# Patient Record
Sex: FEMALE | Race: BLACK OR AFRICAN AMERICAN | Employment: OTHER | URBAN - METROPOLITAN AREA
[De-identification: names, ages, dates, MRNs, and addresses within clinical notes are randomized per-mention and may not be internally consistent; named-entity substitution may affect disease eponyms.]

---

## 2017-05-16 ENCOUNTER — HOSPITAL ENCOUNTER (EMERGENCY)
Age: 82
Discharge: HOME | End: 2017-05-16
Attending: INTERNAL MEDICINE
Payer: COMMERCIAL

## 2017-05-16 VITALS
OXYGEN SATURATION: 97 % | HEIGHT: 62 IN | HEART RATE: 106 BPM | DIASTOLIC BLOOD PRESSURE: 91 MMHG | WEIGHT: 244 LBS | RESPIRATION RATE: 16 BRPM | TEMPERATURE: 97.8 F | BODY MASS INDEX: 44.9 KG/M2 | SYSTOLIC BLOOD PRESSURE: 163 MMHG

## 2017-05-16 DIAGNOSIS — D17.1 LIPOMA OF BACK: Primary | ICD-10-CM

## 2017-05-16 DIAGNOSIS — M79.89 FOOT SWELLING: ICD-10-CM

## 2017-05-16 PROCEDURE — 99283 EMERGENCY DEPT VISIT LOW MDM: CPT

## 2017-05-16 PROCEDURE — 74011250637 HC RX REV CODE- 250/637: Performed by: INTERNAL MEDICINE

## 2017-05-16 RX ORDER — ONDANSETRON 4 MG/1
4 TABLET, ORALLY DISINTEGRATING ORAL
Status: COMPLETED | OUTPATIENT
Start: 2017-05-16 | End: 2017-05-16

## 2017-05-16 RX ORDER — ASPIRIN 81 MG/1
81 TABLET ORAL DAILY
COMMUNITY

## 2017-05-16 RX ORDER — IBUPROFEN 400 MG/1
800 TABLET ORAL
Status: COMPLETED | OUTPATIENT
Start: 2017-05-16 | End: 2017-05-16

## 2017-05-16 RX ORDER — HYDROCODONE BITARTRATE AND ACETAMINOPHEN 5; 325 MG/1; MG/1
1 TABLET ORAL
Qty: 20 TAB | Refills: 0 | Status: SHIPPED | OUTPATIENT
Start: 2017-05-16 | End: 2017-12-09

## 2017-05-16 RX ORDER — CEPHALEXIN 500 MG/1
500 CAPSULE ORAL 4 TIMES DAILY
Qty: 28 CAP | Refills: 0 | Status: SHIPPED | OUTPATIENT
Start: 2017-05-16 | End: 2017-05-23

## 2017-05-16 RX ORDER — ATORVASTATIN CALCIUM 40 MG/1
40 TABLET, FILM COATED ORAL DAILY
COMMUNITY

## 2017-05-16 RX ORDER — LOSARTAN POTASSIUM 50 MG/1
50 TABLET ORAL DAILY
COMMUNITY
End: 2018-04-05

## 2017-05-16 RX ORDER — HYDROCODONE BITARTRATE AND ACETAMINOPHEN 5; 325 MG/1; MG/1
1 TABLET ORAL
Status: DISCONTINUED | OUTPATIENT
Start: 2017-05-16 | End: 2017-05-16 | Stop reason: HOSPADM

## 2017-05-16 RX ADMIN — ONDANSETRON 4 MG: 4 TABLET, ORALLY DISINTEGRATING ORAL at 18:55

## 2017-05-16 RX ADMIN — IBUPROFEN 800 MG: 400 TABLET, FILM COATED ORAL at 18:55

## 2017-05-16 NOTE — ED NOTES
Pt arrives in ED with her daughter, pt lives alone at home, uses a walker. Pt reports right foot swelling x 4 days and left sided neck pain that she woke up with this morning; pt denies injury. 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 MD advises them to return to PCP and explain increase in pain and in size of lipoma. Pedal pulses are intact, no redness to right foot, slight swelling compared to left foot. Emergency Department Nursing Plan of Care       The Nursing Plan of Care is developed from the Nursing assessment and Emergency Department Attending provider initial evaluation. The plan of care may be reviewed in the ED Provider note.     The Plan of Care was developed with the following considerations:   Patient / Family readiness to learn indicated by:verbalized understanding  Persons(s) to be included in education: patient and family  Barriers to Learning/Limitations:No    [address], RN    5/16/2017   6:52 PM

## 2017-05-16 NOTE — DISCHARGE INSTRUCTIONS
Lipoma: Care Instructions  Your Care Instructions  A lipoma is a growth of fat just below the skin. It may feel soft and rubbery. Lipomas can occur anywhere on the body. But they are most common on the torso, neck, upper thighs, upper arms, and armpits. A lipoma does not turn into cancer. Lipomas usually are not treated, because most of them don't hurt or cause problems. But your doctor may remove a lipoma if it is painful, gets infected, or bothers you. Follow-up care is a key part of your treatment and safety. Be sure to make and go to all appointments, and call your doctor if you are having problems. It's also a good idea to know your test results and keep a list of the medicines you take. How can you care for yourself at home? · Lipomas usually need no care at home. · If your doctor removes a lipoma, follow directions for taking care of the cut (incision). When should you call for help? Call your doctor now or seek immediate medical care if:  · You have signs of infection, such as:  ¨ Increased pain, swelling, warmth, or redness. ¨ Red streaks leading from the lipoma. ¨ Pus draining from the lipoma. ¨ A fever. Watch closely for changes in your health, and be sure to contact your doctor if:  · You want to discuss having a lipoma removed. Where can you learn more? Go to http://chase-wali.info/. Enter F477 in the search box to learn more about \"Lipoma: Care Instructions. \"  Current as of: October 13, 2016  Content Version: 11.2  © 3867-1576 [employer]. Care instructions adapted under license by [employer] (which disclaims liability or warranty for this information). If you have questions about a medical condition or this instruction, always ask your healthcare professional. [address] any warranty or liability for your use of this information.

## 2017-05-16 NOTE — ED PROVIDER NOTES
HPI Comments: Nathaneil Hatchet, 80 y.o. female, presents ambulatory with daughter to [facility] ED with cc of constant nonspecific left sided neck pain x 2 days and R > L foot swelling x 4 days. Patient is a poor historian though reports that her neck pain is unchanged with L arm movements. She has a significantly large lipoma of the left upper back present x 6-7 years without evaluation. Patient lives alone and walks with a walker. She has not had any CT or MRI imaging completed of her upper back. PCP: PROVIDER UNKNOWN    PMHx significant for: HTN, DM, HIV  PSHx significant for: cholecystectomy, hysterectomy, cardiac stent, cataracts  Social history significant for: - Tobacco, - EtOH, - Illicit Drug Use    There are no other complaints, changes, or physical findings at this time. Written by MARY Muiribsergio, as dictated by Laura Jimenez MD.     The history is provided by the patient and a relative. No  was used. Past Medical History:   Diagnosis Date    Diabetes ([address]     Hypertension     Infectious disease     HIV in 80 from blood transfusion       Past Surgical History:   Procedure Laterality Date    CARDIAC SURG PROCEDURE UNLIST  1995    Stent Placement    HX CHOLECYSTECTOMY      HX HEENT      cataract    HX HYSTERECTOMY      HX ORTHOPAEDIC           Family History:   Problem Relation Age of Onset    Diabetes Other     Hypertension Other        Social History     Social History    Marital status:      Spouse name: N/A    Number of children: N/A    Years of education: N/A     Occupational History    Not on file. Social History Main Topics    Smoking status: Never Smoker    Smokeless tobacco: Not on file    Alcohol use No    Drug use: No    Sexual activity: No     Other Topics Concern    Not on file     Social History Narrative    Conversation 12/26/16:    She is inclined to be DNR but she would like be full code until she discusses it with her family. Her POA is [facility] [phone]. (Daughter )        She lives alone, her son visits to assist, but she does all ADLs    She deserves six surviving children no history of alcohol cigarette use. ALLERGIES: Coumadin [warfarin]    Review of Systems   Constitutional: Negative. Negative for activity change, appetite change, chills, fatigue, fever and unexpected weight change. HENT: Negative. Negative for congestion, hearing loss, rhinorrhea, sneezing and voice change. Eyes: Negative. Negative for pain and visual disturbance. Respiratory: Negative. Negative for apnea, cough, choking, chest tightness and shortness of breath. Cardiovascular: Positive for leg swelling. Negative for chest pain and palpitations. Gastrointestinal: Negative. Negative for abdominal distention, abdominal pain, blood in stool, diarrhea, nausea and vomiting. Genitourinary: Negative. Negative for difficulty urinating, flank pain, frequency and urgency. No discharge   Musculoskeletal: Positive for neck pain. Negative for arthralgias, back pain, myalgias and neck stiffness. Skin: Negative. Negative for color change and rash. Neurological: Negative. Negative for dizziness, seizures, syncope, speech difficulty, weakness, numbness and headaches. Hematological: Negative for adenopathy. Psychiatric/Behavioral: Negative. Negative for agitation, behavioral problems, dysphoric mood and suicidal ideas. The patient is not nervous/anxious. Vitals:    05/16/17 1810 05/16/17 1824 05/16/17 1826 05/16/17 1855   BP: (!) 163/91      Pulse: (!) 119  (!) 102 (!) 106   Resp: 16      Temp: 97.8 °F (36.6 °C)      SpO2: 95%   97%   Weight: 109.8 kg (242 lb) 110.7 kg (244 lb)     Height: 5' 2\" (1.575 m)               Physical Exam   Constitutional: She is oriented to person, place, and time. She appears well-developed and well-nourished. Morbidly obese. HENT:   Head: Normocephalic and atraumatic. Mouth/Throat: Oropharynx is clear and moist.   Eyes: Conjunctivae and EOM are normal. Pupils are equal, round, and reactive to light. Neck: Normal range of motion. Neck supple. Cardiovascular: Regular rhythm and normal heart sounds. Tachycardia present. Exam reveals no gallop and no friction rub. No murmur heard. Pulses:       Radial pulses are 2+ on the right side, and 2+ on the left side. Dorsalis pedis pulses are 2+ on the right side, and 2+ on the left side. Pulmonary/Chest: Effort normal and breath sounds normal. No respiratory distress. She has no wheezes. She has no rales. Abdominal: Soft. Bowel sounds are normal. She exhibits no distension. There is no tenderness. There is no rebound and no guarding. Musculoskeletal: Normal range of motion. She exhibits no edema or tenderness. Right foot: There is swelling (mild superficial, dorsally). Lymphadenopathy:     She has no cervical adenopathy. Neurological: She is alert and oriented to person, place, and time. She has normal strength. No cranial nerve deficit or sensory deficit. She displays a negative Romberg sign. Coordination and gait normal.   Skin: Skin is warm and dry. No ecchymosis, no lesion and no rash noted. Rash is not urticarial. She is not diaphoretic. No erythema. Psychiatric: She has a normal mood and affect. Nursing note and vitals reviewed. MDM  Number of Diagnoses or Management Options  Foot swelling:   Lipoma of back:   Diagnosis management comments:   Ddx: lipoma, neck strain, nerve impingement, cervical radiculopathy        Amount and/or Complexity of Data Reviewed  Review and summarize past medical records: yes    Patient Progress  Patient progress: stable    ED Course       Procedures    Progress Note:  7:04 PM  Patient resting in [facility] with daughter at bedside. Counseled on home tx plan, f/u instructions, and return precautions.  Patient expresses understanding and agrees to follow up as advised. Addressed questions. Written by MARY Orribe, as dictated by Rozina Mcgarry MD.     MEDICATIONS GIVEN:  Medications   HYDROcodone-acetaminophen (NORCO) 5-325 mg per tablet 1 Tab (1 Tab Oral Refused 5/16/17 1854)   ondansetron (ZOFRAN ODT) tablet 4 mg (4 mg Oral Given 5/16/17 1855)   ibuprofen (MOTRIN) tablet 800 mg (800 mg Oral Given 5/16/17 1855)       IMPRESSION:  1. Lipoma of back    2. Foot swelling        PLAN:  1. Discharge Medication List as of 5/16/2017  7:21 PM      START taking these medications    Details   HYDROcodone-acetaminophen (NORCO) 5-325 mg per tablet Take 1 Tab by mouth every four (4) hours as needed for Pain. Max Daily Amount: 6 Tabs., Print, Disp-20 Tab, R-0      cephALEXin (KEFLEX) 500 mg capsule Take 1 Cap by mouth four (4) times daily for 7 days. , Normal, Disp-28 Cap, R-0         CONTINUE these medications which have NOT CHANGED    Details   aspirin delayed-release 81 mg tablet Take 81 mg by mouth daily. , Historical Med      losartan (COZAAR) 50 mg tablet Take 50 mg by mouth daily. , Historical Med      atorvastatin (LIPITOR) 40 mg tablet Take 40 mg by mouth daily. , Historical Med      hydroCHLOROthiazide (HYDRODIURIL) 25 mg tablet Take 25 mg by mouth daily. , Historical Med      metFORMIN (GLUMETZA ER) 500 mg TG24 24 hour tablet Take 100 mg by mouth two (2) times a day., Historical Med      metoprolol succinate (TOPROL-XL) 100 mg tablet Take 100 mg by mouth daily. , Historical Med      elvitegravir-cobicistat-emtricitabine-tenofovir alafenamide (GENVOYA) tab tablet Take 1 Tab by mouth daily (with breakfast). , Historical Med           2. Follow-up Information     Follow up With Details Comments Contact Info    Maria Guadalupe Gallegos MD In 2 days For wound re-check [address] [place] [address] [phone]          Return to ED if worse     Discharge Note:  6:49 PM  The pt is ready for discharge.  The pt's signs, symptoms, diagnosis, and discharge instructions have been discussed and pt has conveyed their understanding. The pt is to follow up as recommended or return to ER should their symptoms worsen. Plan has been discussed and pt is in agreement. This note is prepared by Lonnie Mortensen, acting as a Scribe for Lee Ann Tucker MD.    Lee Ann Tucker MD: The scribe's documentation has been prepared under my direction and personally reviewed by me in its entirety. I confirm that the notes above accurately reflects all work, treatment, procedures, and medical decision making performed by me.

## 2017-12-09 ENCOUNTER — HOSPITAL ENCOUNTER (EMERGENCY)
Age: 82
Discharge: HOME | End: 2017-12-09
Attending: EMERGENCY MEDICINE
Payer: COMMERCIAL

## 2017-12-09 VITALS
RESPIRATION RATE: 16 BRPM | SYSTOLIC BLOOD PRESSURE: 180 MMHG | WEIGHT: 235 LBS | BODY MASS INDEX: 43.24 KG/M2 | OXYGEN SATURATION: 97 % | HEIGHT: 62 IN | TEMPERATURE: 97.9 F | DIASTOLIC BLOOD PRESSURE: 95 MMHG | HEART RATE: 89 BPM

## 2017-12-09 DIAGNOSIS — R31.9 URINARY TRACT INFECTION WITH HEMATURIA, SITE UNSPECIFIED: Primary | ICD-10-CM

## 2017-12-09 DIAGNOSIS — N39.0 URINARY TRACT INFECTION WITH HEMATURIA, SITE UNSPECIFIED: Primary | ICD-10-CM

## 2017-12-09 LAB
APPEARANCE UR: ABNORMAL
BACTERIA URNS QL MICRO: ABNORMAL /HPF
BILIRUB UR QL: NEGATIVE
COLOR UR: ABNORMAL
EPITH CASTS URNS QL MICRO: ABNORMAL /LPF
GLUCOSE UR STRIP.AUTO-MCNC: NEGATIVE MG/DL
HGB UR QL STRIP: ABNORMAL
KETONES UR QL STRIP.AUTO: NEGATIVE MG/DL
LEUKOCYTE ESTERASE UR QL STRIP.AUTO: ABNORMAL
NITRITE UR QL STRIP.AUTO: NEGATIVE
PH UR STRIP: 6 [PH] (ref 5–8)
PROT UR STRIP-MCNC: 100 MG/DL
RBC #/AREA URNS HPF: ABNORMAL /HPF (ref 0–5)
SP GR UR REFRACTOMETRY: <1.005 (ref 1–1.03)
UA: UC IF INDICATED,UAUC: ABNORMAL
UROBILINOGEN UR QL STRIP.AUTO: 0.2 EU/DL (ref 0.2–1)
WBC URNS QL MICRO: ABNORMAL /HPF (ref 0–4)

## 2017-12-09 PROCEDURE — 99283 EMERGENCY DEPT VISIT LOW MDM: CPT

## 2017-12-09 PROCEDURE — 87086 URINE CULTURE/COLONY COUNT: CPT | Performed by: EMERGENCY MEDICINE

## 2017-12-09 PROCEDURE — 87186 SC STD MICRODIL/AGAR DIL: CPT | Performed by: EMERGENCY MEDICINE

## 2017-12-09 PROCEDURE — 81001 URINALYSIS AUTO W/SCOPE: CPT | Performed by: EMERGENCY MEDICINE

## 2017-12-09 PROCEDURE — 87077 CULTURE AEROBIC IDENTIFY: CPT | Performed by: EMERGENCY MEDICINE

## 2017-12-09 RX ORDER — CEPHALEXIN 500 MG/1
500 CAPSULE ORAL 2 TIMES DAILY
Qty: 14 CAP | Refills: 0 | Status: SHIPPED | OUTPATIENT
Start: 2017-12-09 | End: 2017-12-16

## 2017-12-09 RX ORDER — CEPHALEXIN 500 MG/1
500 CAPSULE ORAL 2 TIMES DAILY
Qty: 14 CAP | Refills: 0 | Status: SHIPPED | OUTPATIENT
Start: 2017-12-09 | End: 2017-12-09

## 2017-12-09 NOTE — ED PROVIDER NOTES
HPI Comments: Melina Cedillo is a 80 y.o. female w/ hx significant for HTN, DM, and HIV who presents ambulatory to the ED c/o hematuria, dysuria, frequency, and urgency starting this AM. Pt endorses her BG was 109 this AM.     Pt specifically denies fever, chills, diarrhea, abd pain, lightheadedness, n/v, headache, cp, sob. PCP: PROVIDER UNKNOWN    There are no other complaints, changes or physical findings at this time. Patient is a 80 y.o. female presenting with urinary pain. The history is provided by the patient. Urinary Pain    This is a new problem. The current episode started 6 to 12 hours ago. The problem occurs every urination. The problem has been gradually worsening. The quality of the pain is described as burning. The patient is experiencing no pain. There has been no fever. Associated symptoms include frequency, hematuria, hesitancy and urgency. Pertinent negatives include no chills, no sweats, no nausea, no vomiting, no discharge, no flank pain, no vaginal discharge, no abdominal pain and no back pain. The patient is not pregnant. Treatments tried: Pt states she took one 1 leftover abx this am. unknown name. Past Medical History:   Diagnosis Date    Diabetes ([address]     Hypertension     Infectious disease     HIV in 80 from blood transfusion       Past Surgical History:   Procedure Laterality Date    CARDIAC SURG PROCEDURE UNLIST  1995    Stent Placement    HX CHOLECYSTECTOMY      HX HEENT      cataract    HX HYSTERECTOMY      HX ORTHOPAEDIC           Family History:   Problem Relation Age of Onset    Diabetes Other     Hypertension Other        Social History     Social History    Marital status:      Spouse name: N/A    Number of children: N/A    Years of education: N/A     Occupational History    Not on file.      Social History Main Topics    Smoking status: Never Smoker    Smokeless tobacco: Not on file    Alcohol use No    Drug use: No    Sexual activity: No Other Topics Concern    Not on file     Social History Narrative    Conversation 12/26/16:    She is inclined to be DNR but she would like be full code until she discusses it with her family. Her POA is [facility] [phone]. (Daughter )        She lives alone, her son visits to assist, but she does all ADLs    She deserves six surviving children no history of alcohol cigarette use. ALLERGIES: Coumadin [warfarin]    Review of Systems   Constitutional: Negative for activity change, chills, fatigue and fever. HENT: Negative. Respiratory: Negative. Negative for cough and shortness of breath. Cardiovascular: Negative. Negative for chest pain. Gastrointestinal: Negative for abdominal pain, constipation, diarrhea, nausea and vomiting. Genitourinary: Positive for dysuria, frequency, hematuria, hesitancy and urgency. Negative for difficulty urinating, flank pain, pelvic pain, vaginal bleeding, vaginal discharge and vaginal pain. Musculoskeletal: Negative. Negative for back pain. Skin: Negative. Negative for rash. Neurological: Negative. Negative for dizziness, syncope, weakness, light-headedness and headaches. Psychiatric/Behavioral: Negative. Vitals:    12/09/17 1507   BP: (!) 180/95   Pulse: 89   Resp: 16   Temp: 97.9 °F (36.6 °C)   SpO2: 97%   Weight: 106.6 kg (235 lb)   Height: 5' 2\" (1.575 m)            Physical Exam   Constitutional: She is oriented to person, place, and time. She appears well-developed and well-nourished. No distress. HENT:   Head: Normocephalic and atraumatic. Right Ear: Hearing and external ear normal.   Left Ear: Hearing and external ear normal.   Nose: Nose normal.   Eyes: Conjunctivae and EOM are normal. Pupils are equal, round, and reactive to light. Neck: Normal range of motion. Cardiovascular: Normal rate, regular rhythm, normal heart sounds and intact distal pulses.     Pulmonary/Chest: Effort normal. No respiratory distress. She has no wheezes. She has no rales. She exhibits no tenderness. Abdominal: Soft. Bowel sounds are normal. She exhibits no distension and no mass. There is no tenderness. There is no rebound and no guarding. Musculoskeletal: Normal range of motion. Neurological: She is alert and oriented to person, place, and time. Skin: Skin is warm and dry. She is not diaphoretic. Psychiatric: She has a normal mood and affect. Her behavior is normal. Judgment and thought content normal.   Nursing note and vitals reviewed.        MDM  Number of Diagnoses or Management Options  Urinary tract infection without hematuria, site unspecified:   Diagnosis management comments: DDX: uti, pyelo, kidney stone    LABORATORY TESTS:  Recent Results (from the past 12 hour(s))  -URINALYSIS W/ REFLEX CULTURE  Collection Time: 12/09/17  4:00 PM       Result                                            Value                         Ref Range                       Color                                             YELLOW/STRAW                                                  Appearance                                        TURBID (A)                    CLEAR                           Specific gravity                                  <1.005                        1.003 - 1.030                   pH (UA)                                           6.0                           5.0 - 8.0                       Protein                                           100 (A)                       NEG mg/dL                       Glucose                                           NEGATIVE                      NEG mg/dL                       Ketone                                            NEGATIVE                      NEG mg/dL                       Bilirubin                                         NEGATIVE                      NEG                             Blood                                             LARGE (A)                     NEG Urobilinogen                                      0.2                           0.2 - 1.0 EU/dL                 Nitrites                                          NEGATIVE                      NEG                             Leukocyte Esterase                                LARGE (A)                     NEG                             WBC                                                                       0 - 4 /hpf                      RBC                                               5-10                          0 - 5 /hpf                      Epithelial cells                                  FEW                           FEW /lpf                        Bacteria                                          3+ (A)                        NEG /hpf                        UA:UC IF INDICATED                                URINE CULTURE ORDERED (A)     CNI                          IMAGING RESULTS:  No orders to display    MEDICATIONS GIVEN:  Medications - No data to display    IMPRESSION:  Urinary tract infection without hematuria, site unspecified  (primary encounter diagnosis)    PLAN:  1. Current Discharge Medication List    START taking these medications    cephALEXin (KEFLEX) 500 mg capsule  Take 1 Cap by mouth two (2) times a day for 7 days. Qty: 14 Cap Refills: 0      CONTINUE these medications which have NOT CHANGED    aspirin delayed-release 81 mg tablet  Take 81 mg by mouth daily. losartan (COZAAR) 50 mg tablet  Take 50 mg by mouth daily. atorvastatin (LIPITOR) 40 mg tablet  Take 40 mg by mouth daily. hydroCHLOROthiazide (HYDRODIURIL) 25 mg tablet  Take 25 mg by mouth daily. metFORMIN (GLUMETZA ER) 500 mg TG24 24 hour tablet  Take 100 mg by mouth two (2) times a day. metoprolol succinate (TOPROL-XL) 100 mg tablet  Take 100 mg by mouth daily.     elvitegravir-cobicistat-emtricitabine-tenofovir alafenamide (GENVOYA) tab tablet  Take 1 Tab by mouth daily (with breakfast). 2. Follow-up Information     Follow up With Details Comments Contact Info    Provider Unknown Schedule an appointment as soon as possible for a visit   in 1 week As needed, If symptoms worsen Patient not available to ask      Geovanna Hyde [address] Schedule an appointment as soon   as possible for a visit in 1 week As needed, If symptoms worsen Andre Bejarano  [phone]      Return to ED if worse                  Amount and/or Complexity of Data Reviewed  Clinical lab tests: ordered and reviewed    Patient Progress  Patient progress: stable    ED Course       Procedures    4:40 PM  I have discussed with patient their diagnosis, treatment, and follow up plan. The patient agrees to follow up as outlined in discharge paperwork and also to return to the ED with any worsening.  Mario Arriaza PA-C

## 2017-12-09 NOTE — DISCHARGE INSTRUCTIONS

## 2017-12-09 NOTE — ED NOTES
Emergency Department Nursing Plan of Care       The Nursing Plan of Care is developed from the Nursing assessment and Emergency Department Attending provider initial evaluation. The plan of care may be reviewed in the ED Provider note.     The Plan of Care was developed with the following considerations:   Patient / Family readiness to learn indicated by:verbalized understanding  Persons(s) to be included in education: patient  Barriers to Learning/Limitations:No    Signed     Amada Marcial RN    12/9/2017   5:10 PM

## 2017-12-11 LAB
BACTERIA SPEC CULT: ABNORMAL
CC UR VC: ABNORMAL
SERVICE CMNT-IMP: ABNORMAL

## 2018-04-05 ENCOUNTER — APPOINTMENT (OUTPATIENT)
Dept: CT IMAGING | Age: 83
DRG: 101 | End: 2018-04-05
Attending: EMERGENCY MEDICINE
Payer: MEDICARE

## 2018-04-05 ENCOUNTER — HOSPITAL ENCOUNTER (OUTPATIENT)
Age: 83
Setting detail: OBSERVATION
Discharge: HOME | DRG: 101 | End: 2018-04-06
Attending: EMERGENCY MEDICINE | Admitting: INTERNAL MEDICINE
Payer: MEDICARE

## 2018-04-05 DIAGNOSIS — G45.9 TRANSIENT CEREBRAL ISCHEMIA, UNSPECIFIED TYPE: Primary | ICD-10-CM

## 2018-04-05 DIAGNOSIS — I48.91 ATRIAL FIBRILLATION, UNSPECIFIED TYPE (HCC): ICD-10-CM

## 2018-04-05 DIAGNOSIS — N28.9 ACUTE RENAL INSUFFICIENCY: ICD-10-CM

## 2018-04-05 PROBLEM — N17.9 AKI (ACUTE KIDNEY INJURY) (HCC): Status: ACTIVE | Noted: 2018-04-05

## 2018-04-05 LAB
ALBUMIN SERPL-MCNC: 3.5 G/DL (ref 3.5–5)
ALBUMIN/GLOB SERPL: 0.8 {RATIO} (ref 1.1–2.2)
ALP SERPL-CCNC: 79 U/L (ref 45–117)
ALT SERPL-CCNC: 22 U/L (ref 12–78)
ANION GAP SERPL CALC-SCNC: 10 MMOL/L (ref 5–15)
APPEARANCE UR: CLEAR
AST SERPL-CCNC: 22 U/L (ref 15–37)
BACTERIA URNS QL MICRO: NEGATIVE /HPF
BASOPHILS # BLD: 0 K/UL (ref 0–0.1)
BASOPHILS NFR BLD: 1 % (ref 0–1)
BILIRUB SERPL-MCNC: 0.4 MG/DL (ref 0.2–1)
BILIRUB UR QL: NEGATIVE
BUN SERPL-MCNC: 23 MG/DL (ref 6–20)
BUN/CREAT SERPL: 13 (ref 12–20)
CALCIUM SERPL-MCNC: 8.9 MG/DL (ref 8.5–10.1)
CHLORIDE SERPL-SCNC: 105 MMOL/L (ref 97–108)
CK MB CFR SERPL CALC: 0.7 % (ref 0–2.5)
CK MB SERPL-MCNC: 1.2 NG/ML (ref 5–25)
CK SERPL-CCNC: 164 U/L (ref 26–192)
CO2 SERPL-SCNC: 27 MMOL/L (ref 21–32)
COLOR UR: ABNORMAL
CREAT SERPL-MCNC: 1.81 MG/DL (ref 0.55–1.02)
DIFFERENTIAL METHOD BLD: ABNORMAL
EOSINOPHIL # BLD: 0.2 K/UL (ref 0–0.4)
EOSINOPHIL NFR BLD: 2 % (ref 0–7)
EPITH CASTS URNS QL MICRO: ABNORMAL /LPF
ERYTHROCYTE [DISTWIDTH] IN BLOOD BY AUTOMATED COUNT: 13.2 % (ref 11.5–14.5)
GLOBULIN SER CALC-MCNC: 4.3 G/DL (ref 2–4)
GLUCOSE BLD STRIP.AUTO-MCNC: 119 MG/DL (ref 65–100)
GLUCOSE BLD STRIP.AUTO-MCNC: 147 MG/DL (ref 65–100)
GLUCOSE SERPL-MCNC: 132 MG/DL (ref 65–100)
GLUCOSE UR STRIP.AUTO-MCNC: NEGATIVE MG/DL
HCT VFR BLD AUTO: 33.6 % (ref 35–47)
HGB BLD-MCNC: 10.8 G/DL (ref 11.5–16)
HGB UR QL STRIP: NEGATIVE
IMM GRANULOCYTES # BLD: 0 K/UL (ref 0–0.04)
IMM GRANULOCYTES NFR BLD AUTO: 0 % (ref 0–0.5)
KETONES UR QL STRIP.AUTO: NEGATIVE MG/DL
LEUKOCYTE ESTERASE UR QL STRIP.AUTO: ABNORMAL
LYMPHOCYTES # BLD: 1.6 K/UL (ref 0.8–3.5)
LYMPHOCYTES NFR BLD: 25 % (ref 12–49)
MCH RBC QN AUTO: 29.3 PG (ref 26–34)
MCHC RBC AUTO-ENTMCNC: 32.1 G/DL (ref 30–36.5)
MCV RBC AUTO: 91.3 FL (ref 80–99)
MONOCYTES # BLD: 0.5 K/UL (ref 0–1)
MONOCYTES NFR BLD: 8 % (ref 5–13)
MUCOUS THREADS URNS QL MICRO: ABNORMAL /LPF
NEUTS SEG # BLD: 4 K/UL (ref 1.8–8)
NEUTS SEG NFR BLD: 64 % (ref 32–75)
NITRITE UR QL STRIP.AUTO: NEGATIVE
NRBC # BLD: 0 K/UL (ref 0–0.01)
NRBC BLD-RTO: 0 PER 100 WBC
PH UR STRIP: 5.5 [PH] (ref 5–8)
PLATELET # BLD AUTO: 236 K/UL (ref 150–400)
PMV BLD AUTO: 10.9 FL (ref 8.9–12.9)
POTASSIUM SERPL-SCNC: 4 MMOL/L (ref 3.5–5.1)
PROT SERPL-MCNC: 7.8 G/DL (ref 6.4–8.2)
PROT UR STRIP-MCNC: NEGATIVE MG/DL
RBC # BLD AUTO: 3.68 M/UL (ref 3.8–5.2)
RBC #/AREA URNS HPF: ABNORMAL /HPF (ref 0–5)
SERVICE CMNT-IMP: ABNORMAL
SERVICE CMNT-IMP: ABNORMAL
SODIUM SERPL-SCNC: 142 MMOL/L (ref 136–145)
SP GR UR REFRACTOMETRY: 1.01 (ref 1–1.03)
TROPONIN I SERPL-MCNC: <0.04 NG/ML
UA: UC IF INDICATED,UAUC: ABNORMAL
UROBILINOGEN UR QL STRIP.AUTO: 0.2 EU/DL (ref 0.2–1)
WBC # BLD AUTO: 6.2 K/UL (ref 3.6–11)
WBC URNS QL MICRO: ABNORMAL /HPF (ref 0–4)

## 2018-04-05 PROCEDURE — 96360 HYDRATION IV INFUSION INIT: CPT

## 2018-04-05 PROCEDURE — 82962 GLUCOSE BLOOD TEST: CPT

## 2018-04-05 PROCEDURE — 99284 EMERGENCY DEPT VISIT MOD MDM: CPT

## 2018-04-05 PROCEDURE — 85025 COMPLETE CBC W/AUTO DIFF WBC: CPT | Performed by: EMERGENCY MEDICINE

## 2018-04-05 PROCEDURE — 96361 HYDRATE IV INFUSION ADD-ON: CPT

## 2018-04-05 PROCEDURE — 65270000029 HC RM PRIVATE

## 2018-04-05 PROCEDURE — 94762 N-INVAS EAR/PLS OXIMTRY CONT: CPT

## 2018-04-05 PROCEDURE — 99218 HC RM OBSERVATION: CPT

## 2018-04-05 PROCEDURE — 84484 ASSAY OF TROPONIN QUANT: CPT | Performed by: EMERGENCY MEDICINE

## 2018-04-05 PROCEDURE — 36415 COLL VENOUS BLD VENIPUNCTURE: CPT | Performed by: EMERGENCY MEDICINE

## 2018-04-05 PROCEDURE — 70450 CT HEAD/BRAIN W/O DYE: CPT

## 2018-04-05 PROCEDURE — 80053 COMPREHEN METABOLIC PANEL: CPT | Performed by: EMERGENCY MEDICINE

## 2018-04-05 PROCEDURE — 82550 ASSAY OF CK (CPK): CPT | Performed by: EMERGENCY MEDICINE

## 2018-04-05 PROCEDURE — 81001 URINALYSIS AUTO W/SCOPE: CPT | Performed by: EMERGENCY MEDICINE

## 2018-04-05 PROCEDURE — 74011250636 HC RX REV CODE- 250/636: Performed by: INTERNAL MEDICINE

## 2018-04-05 PROCEDURE — 74011250636 HC RX REV CODE- 250/636: Performed by: EMERGENCY MEDICINE

## 2018-04-05 PROCEDURE — 93005 ELECTROCARDIOGRAM TRACING: CPT

## 2018-04-05 RX ORDER — ACETAMINOPHEN 650 MG/1
650 SUPPOSITORY RECTAL
Status: DISCONTINUED | OUTPATIENT
Start: 2018-04-05 | End: 2018-04-06 | Stop reason: HOSPADM

## 2018-04-05 RX ORDER — MELATONIN
1000 DAILY
COMMUNITY
End: 2018-04-25 | Stop reason: SDUPTHER

## 2018-04-05 RX ORDER — ZINC OXIDE 200 MG/G
OINTMENT TOPICAL AS NEEDED
COMMUNITY
End: 2021-07-12

## 2018-04-05 RX ORDER — ISOSORBIDE MONONITRATE 60 MG/1
60 TABLET, EXTENDED RELEASE ORAL
COMMUNITY
End: 2021-07-12

## 2018-04-05 RX ORDER — SODIUM CHLORIDE 0.9 % (FLUSH) 0.9 %
5-10 SYRINGE (ML) INJECTION EVERY 8 HOURS
Status: DISCONTINUED | OUTPATIENT
Start: 2018-04-05 | End: 2018-04-06 | Stop reason: HOSPADM

## 2018-04-05 RX ORDER — ASPIRIN 81 MG/1
81 TABLET ORAL DAILY
Status: DISCONTINUED | OUTPATIENT
Start: 2018-04-06 | End: 2018-04-06 | Stop reason: HOSPADM

## 2018-04-05 RX ORDER — SODIUM CHLORIDE 0.9 % (FLUSH) 0.9 %
5-10 SYRINGE (ML) INJECTION AS NEEDED
Status: DISCONTINUED | OUTPATIENT
Start: 2018-04-05 | End: 2018-04-06 | Stop reason: HOSPADM

## 2018-04-05 RX ORDER — DEXTROSE 50 % IN WATER (D50W) INTRAVENOUS SYRINGE
12.5-25 AS NEEDED
Status: DISCONTINUED | OUTPATIENT
Start: 2018-04-05 | End: 2018-04-06 | Stop reason: HOSPADM

## 2018-04-05 RX ORDER — ZINC OXIDE 20 G/100G
OINTMENT TOPICAL AS NEEDED
Status: DISCONTINUED | OUTPATIENT
Start: 2018-04-05 | End: 2018-04-06 | Stop reason: HOSPADM

## 2018-04-05 RX ORDER — ISOSORBIDE MONONITRATE 30 MG/1
60 TABLET, EXTENDED RELEASE ORAL
Status: DISCONTINUED | OUTPATIENT
Start: 2018-04-06 | End: 2018-04-06 | Stop reason: HOSPADM

## 2018-04-05 RX ORDER — ENOXAPARIN SODIUM 100 MG/ML
40 INJECTION SUBCUTANEOUS EVERY 24 HOURS
Status: DISCONTINUED | OUTPATIENT
Start: 2018-04-05 | End: 2018-04-06 | Stop reason: HOSPADM

## 2018-04-05 RX ORDER — MELATONIN
1000 DAILY
Status: DISCONTINUED | OUTPATIENT
Start: 2018-04-06 | End: 2018-04-06 | Stop reason: HOSPADM

## 2018-04-05 RX ORDER — SODIUM CHLORIDE 9 MG/ML
75 INJECTION, SOLUTION INTRAVENOUS CONTINUOUS
Status: DISCONTINUED | OUTPATIENT
Start: 2018-04-05 | End: 2018-04-06 | Stop reason: HOSPADM

## 2018-04-05 RX ORDER — METOPROLOL SUCCINATE 50 MG/1
100 TABLET, EXTENDED RELEASE ORAL DAILY
Status: DISCONTINUED | OUTPATIENT
Start: 2018-04-05 | End: 2018-04-05

## 2018-04-05 RX ORDER — ACETAMINOPHEN 325 MG/1
650 TABLET ORAL
Status: DISCONTINUED | OUTPATIENT
Start: 2018-04-05 | End: 2018-04-06 | Stop reason: HOSPADM

## 2018-04-05 RX ORDER — INSULIN LISPRO 100 [IU]/ML
INJECTION, SOLUTION INTRAVENOUS; SUBCUTANEOUS
Status: DISCONTINUED | OUTPATIENT
Start: 2018-04-05 | End: 2018-04-06 | Stop reason: HOSPADM

## 2018-04-05 RX ORDER — MAGNESIUM SULFATE 100 %
4 CRYSTALS MISCELLANEOUS AS NEEDED
Status: DISCONTINUED | OUTPATIENT
Start: 2018-04-05 | End: 2018-04-06 | Stop reason: HOSPADM

## 2018-04-05 RX ORDER — ATORVASTATIN CALCIUM 40 MG/1
40 TABLET, FILM COATED ORAL DAILY
Status: DISCONTINUED | OUTPATIENT
Start: 2018-04-06 | End: 2018-04-06 | Stop reason: HOSPADM

## 2018-04-05 RX ORDER — METOPROLOL SUCCINATE 50 MG/1
100 TABLET, EXTENDED RELEASE ORAL DAILY
Status: DISCONTINUED | OUTPATIENT
Start: 2018-04-06 | End: 2018-04-06 | Stop reason: HOSPADM

## 2018-04-05 RX ORDER — LOSARTAN POTASSIUM 100 MG/1
100 TABLET ORAL DAILY
COMMUNITY
End: 2021-07-12

## 2018-04-05 RX ADMIN — SODIUM CHLORIDE 500 ML: 900 INJECTION, SOLUTION INTRAVENOUS at 16:09

## 2018-04-05 RX ADMIN — Medication 10 ML: at 22:00

## 2018-04-05 RX ADMIN — SODIUM CHLORIDE 75 ML/HR: 900 INJECTION, SOLUTION INTRAVENOUS at 19:25

## 2018-04-05 NOTE — ED TRIAGE NOTES
Pt presents in Ed with her daughter,daughter concerned pt has stroke like sx as since this noon pt getting confused,falling out. Pt alert,oriented x 4 on arrival,walk to bed from chair with steady gait,denies dizzy,numbness,tingling,weakness.

## 2018-04-05 NOTE — H&P
Hospitalist Admission Note    NAME: Rafael Guerrero   :  1935   MRN:  [id number]   Room Number: 527/33  @ [facility]     Date/Time:  2018 4:24 PM    Patient PCP: PROVIDER UNKNOWN  ______________________________________________________________________  Given the patient's current clinical presentation, I have a high level of concern for decompensation if discharged from the emergency department. Complex decision making was performed, which includes reviewing the patient's available past medical records, laboratory results, and x-ray films. My assessment of this patient's clinical condition and my plan of care is as follows. Assessment / Plan:    TIA v/s Absence seizures:POA  History of TIA in   -tele admission  -permissive HTN  -ASA/Plavix  -High dose statin  -Fasting lipid panel, Hba1c  -neurology consult  -echo, MRI head and MRA head and neck  -dysphagia screening  PT/OT eval    Acute kidney injury on CKD 3  Prerenal  IV fluids, monitor creatinine  Creatinine 1.8(baseline 1.2)  Hold losartan in the setting of a nuzhat    ? Possible wandering atrial pacemaker with Atrial Premature contractions on EKG  2D echo  Get cardiology consult  ASA    Hypertension, essential  Hold Toprol-XL(permissive HTN)    Diabetes mellitus  Hold metformin, insulin sliding scale  A1c 6.7 last year  Diabetic diet    Asymptomatic HIV  Follows up with [facility] ID clinic  Currently on Genvoya    Morbid obese  Body mass index is 44.13 kg/(m^2). Code Status: full  Surrogate Decision Maker:daughter    DVT Prophylaxis: Lovenox  GI Prophylaxis: not indicated    Baseline: ambulates with help of cane,lives alone. Son/daughter check on her everyday        Subjective:   CHIEF COMPLAINT: AMS    HISTORY OF PRESENT ILLNESS:     Rafael Guerrero is a 80 y.o.  female with PMH of TIA,HTN,DM who presents to ED with c/o above. History was taken patient and daughters at bedside.   Per them ,patient was confused and disoriented. She had starring spell for about 10-15 minutes after which came back to her baseline. They deny her having any seizure activity. She did not lose her consciousness or had any head injuries. Patient does have a remote history of TIAs in the past with questionable absence seizure. She states she is not on Depakote. She does have a history of HIV secondary to blood transfusion and is monitored by HIV clinic at [facility]. In ED,CT head-: Stable nonspecific white matter disease. No acute intracranial process identified. EKG was done which revealed atrial fibrillation. Patient denies having history of A. fib in the past.    We were asked to admit for work up and evaluation of the above problems. Past Medical History:   Diagnosis Date    Diabetes ([address]     Hypertension     Infectious disease     HIV in 80 from blood transfusion        Past Surgical History:   Procedure Laterality Date    CARDIAC SURG PROCEDURE UNLIST  1995    Stent Placement    HX CHOLECYSTECTOMY      HX HEENT      cataract    HX HYSTERECTOMY      HX ORTHOPAEDIC         Social History   Substance Use Topics    Smoking status: Never Smoker    Smokeless tobacco: Never Used    Alcohol use No        Family History   Problem Relation Age of Onset    Diabetes Other     Hypertension Other      Allergies   Allergen Reactions    Coumadin [Warfarin] Hives        Prior to Admission medications    Medication Sig Start Date End Date Taking? Authorizing Provider   losartan (COZAAR) 100 mg tablet Take 100 mg by mouth daily. Yes Historical Provider   cholecalciferol (VITAMIN D3) 1,000 unit tablet Take 1,000 Units by mouth daily. Yes Historical Provider   liver oil-zinc oxide ointment Apply  to affected area as needed for Skin Irritation. Yes Historical Provider   aspirin delayed-release 81 mg tablet Take 81 mg by mouth daily. Yes Phys Other, MD   atorvastatin (LIPITOR) 40 mg tablet Take 40 mg by mouth daily.    Yes Luzma Lindquist, MD   hydroCHLOROthiazide (HYDRODIURIL) 25 mg tablet Take 25 mg by mouth daily. Yes Historical Provider   metFORMIN (GLUMETZA ER) 500 mg TG24 24 hour tablet Take 500 mg by mouth two (2) times a day. Yes Historical Provider   metoprolol succinate (TOPROL-XL) 100 mg tablet Take 100 mg by mouth daily. Yes Historical Provider   elvitegravir-cobicistat-emtricitabine-tenofovir alafenamide (GENVOYA) tab tablet Take 1 Tab by mouth daily (with breakfast). Yes Historical Provider   isosorbide mononitrate ER (IMDUR) 60 mg CR tablet Take 60 mg by mouth every morning. NEW RX on 3/2/18  not yet started. Historical Provider       REVIEW OF SYSTEMS:     I am not able to complete the review of systems because:    The patient is intubated and sedated    The patient has altered mental status due to his acute medical problems    The patient has baseline aphasia from prior stroke(s)    The patient has baseline dementia and is not reliable historian    The patient is in acute medical distress and unable to provide information           Total of 12 systems reviewed as follows:       POSITIVE= underlined text  Negative = text not underlined  General:  fever, chills, sweats, generalized weakness, weight loss/gain,      loss of appetite   Eyes:    blurred vision, eye pain, loss of vision, double vision  ENT:    rhinorrhea, pharyngitis   Respiratory:   cough, sputum production, SOB, EDWARDS, wheezing, pleuritic pain   Cardiology:   chest pain, palpitations, orthopnea, PND, edema, syncope   Gastrointestinal:  abdominal pain , N/V, diarrhea, dysphagia, constipation, bleeding   Genitourinary:  frequency, urgency, dysuria, hematuria, incontinence   Muskuloskeletal :  arthralgia, myalgia, back pain  Hematology:  easy bruising, nose or gum bleeding, lymphadenopathy   Dermatological: rash, ulceration, pruritis, color change / jaundice  Endocrine:   hot flashes or polydipsia   Neurological:  headache, dizziness, confusion, focal weakness, paresthesia,     Speech difficulties, memory loss, gait difficulty  Psychological: Feelings of anxiety, depression, agitation    Objective:   VITALS:    Visit Vitals    BP (!) 152/99 (BP 1 Location: Right arm, BP Patient Position: Sitting)    Pulse 82    Temp 98 °F (36.7 °C)    Resp 16    Ht 5' 2\" (1.575 m)    Wt 109.5 kg (241 lb 4.8 oz)    SpO2 97%    BMI 44.13 kg/m2       PHYSICAL EXAM:    General:    Alert, cooperative, no distress, appears stated age. HEENT: Atraumatic, anicteric sclerae, pink conjunctivae     No oral ulcers, mucosa moist, throat clear, dentition fair  Neck:  Supple, symmetrical,  thyroid: non tender  Lungs:   Clear to auscultation bilaterally. No Wheezing or Rhonchi. No rales. Chest wall:  No tenderness  No Accessory muscle use. Heart:   Regular  rhythm,  No  murmur   No edema  Abdomen:   Soft, non-tender. Not distended. Bowel sounds normal  Extremities: No cyanosis. No clubbing,      Skin turgor normal, Capillary refill normal, Radial dial pulse 2+  Skin:     Not pale. Not Jaundiced  No rashes   Psych:  Good insight. Not depressed. Not anxious or agitated. Neurologic: EOMs intact. No facial asymmetry. No aphasia or slurred speech. Symmetrical strength, Sensation grossly intact.  Alert and oriented X 4.     ______________________________________________________________________    Care Plan discussed with:  Patient/Family, Nurse and     Expected  Disposition:  SNF/LTC  ________________________________________________________________________  TOTAL TIME:  90 Minutes    Critical Care Provided     Minutes non procedure based      Comments     Reviewed previous records   >50% of visit spent in counseling and coordination of care  Discussion with patient and/or family and questions answered       ________________________________________________________________________  Signed: Ariana Elizalde, MD    Procedures: see electronic medical records for all procedures/Xrays and details which were not copied into this note but were reviewed prior to creation of Plan. LAB DATA REVIEWED:    Recent Results (from the past 24 hour(s))   TROPONIN I    Collection Time: 04/05/18  2:53 PM   Result Value Ref Range    Troponin-I, Qt. <0.04 <0.05 ng/mL   CBC WITH AUTOMATED DIFF    Collection Time: 04/05/18  2:53 PM   Result Value Ref Range    WBC 6.2 3.6 - 11.0 K/uL    RBC 3.68 (L) 3.80 - 5.20 M/uL    HGB 10.8 (L) 11.5 - 16.0 g/dL    HCT 33.6 (L) 35.0 - 47.0 %    MCV 91.3 80.0 - 99.0 FL    MCH 29.3 26.0 - 34.0 PG    MCHC 32.1 30.0 - 36.5 g/dL    RDW 13.2 11.5 - 14.5 %    PLATELET 634 126 - 645 K/uL    MPV 10.9 8.9 - 12.9 FL    NRBC 0.0 0  WBC    ABSOLUTE NRBC 0.00 0.00 - 0.01 K/uL    NEUTROPHILS 64 32 - 75 %    LYMPHOCYTES 25 12 - 49 %    MONOCYTES 8 5 - 13 %    EOSINOPHILS 2 0 - 7 %    BASOPHILS 1 0 - 1 %    IMMATURE GRANULOCYTES 0 0.0 - 0.5 %    ABS. NEUTROPHILS 4.0 1.8 - 8.0 K/UL    ABS. LYMPHOCYTES 1.6 0.8 - 3.5 K/UL    ABS. MONOCYTES 0.5 0.0 - 1.0 K/UL    ABS. EOSINOPHILS 0.2 0.0 - 0.4 K/UL    ABS. BASOPHILS 0.0 0.0 - 0.1 K/UL    ABS. IMM. GRANS. 0.0 0.00 - 0.04 K/UL    DF AUTOMATED     METABOLIC PANEL, COMPREHENSIVE    Collection Time: 04/05/18  2:53 PM   Result Value Ref Range    Sodium 142 136 - 145 mmol/L    Potassium 4.0 3.5 - 5.1 mmol/L    Chloride 105 97 - 108 mmol/L    CO2 27 21 - 32 mmol/L    Anion gap 10 5 - 15 mmol/L    Glucose 132 (H) 65 - 100 mg/dL    BUN 23 (H) 6 - 20 MG/DL    Creatinine 1.81 (H) 0.55 - 1.02 MG/DL    BUN/Creatinine ratio 13 12 - 20      GFR est AA 32 (L) >60 ml/min/1.73m2    GFR est non-AA 27 (L) >60 ml/min/1.73m2    Calcium 8.9 8.5 - 10.1 MG/DL    Bilirubin, total 0.4 0.2 - 1.0 MG/DL    ALT (SGPT) 22 12 - 78 U/L    AST (SGOT) 22 15 - 37 U/L    Alk.  phosphatase 79 45 - 117 U/L    Protein, total 7.8 6.4 - 8.2 g/dL    Albumin 3.5 3.5 - 5.0 g/dL    Globulin 4.3 (H) 2.0 - 4.0 g/dL    A-G Ratio 0.8 (L) 1.1 - 2.2     CK W/ CKMB & INDEX Collection Time: 04/05/18  2:53 PM   Result Value Ref Range     26 - 192 U/L    CK - MB 1.2 <3.6 NG/ML    CK-MB Index 0.7 0.0 - 2.5     URINALYSIS W/ REFLEX CULTURE    Collection Time: 04/05/18  4:07 PM   Result Value Ref Range    Color YELLOW/STRAW      Appearance CLEAR CLEAR      Specific gravity 1.010 1.003 - 1.030      pH (UA) 5.5 5.0 - 8.0      Protein NEGATIVE  NEG mg/dL    Glucose NEGATIVE  NEG mg/dL    Ketone NEGATIVE  NEG mg/dL    Bilirubin NEGATIVE  NEG      Blood NEGATIVE  NEG      Urobilinogen 0.2 0.2 - 1.0 EU/dL    Nitrites NEGATIVE  NEG      Leukocyte Esterase TRACE (A) NEG      WBC 0-4 0 - 4 /hpf    RBC 0-5 0 - 5 /hpf    Epithelial cells FEW FEW /lpf    Bacteria NEGATIVE  NEG /hpf    UA:UC IF INDICATED CULTURE NOT INDICATED BY UA RESULT CNI      Mucus 1+ (A) NEG /lpf   GLUCOSE, POC    Collection Time: 04/05/18  6:46 PM   Result Value Ref Range    Glucose (POC) 119 (H) 65 - 100 mg/dL    Performed by Ana Kruse

## 2018-04-05 NOTE — ED PROVIDER NOTES
EMERGENCY DEPARTMENT HISTORY AND PHYSICAL EXAM      Date: 4/5/2018  Patient Name: Beatriz Jacobo    History of Presenting Illness     Chief Complaint   Patient presents with    Altered mental status     pt daughter reported pt confused,almost fall x 2-3 but did't fall,don't remember things. started today at noon. History Provided By: Patient and Patient's Daughter    HPI: Beatriz Jacobo, 80 y.o. female with PMHx significant for HTN, DM, and HIV, presents ambulatory to the ED with cc of 10 minute episode of altered mental status around noon today. Pt states she was seated when she began feeling light headed, but she does not remember the rest of the event. Family states the pt was \"completely incoherent and was not recognizing anything\" for the 10 minute episode before gradually returning to her normal baseline. Pt states the symptoms have resolved and she feels fine now. Pt also notes she has a history of similar episodes previously. Prior cardiology note from [facility] was reviewed. Pt denies recent fever, or any alleviating/exacerbating factors. PCP: PROVIDER UNKNOWN    There are no other complaints, changes, or physical findings at this time. Current Facility-Administered Medications   Medication Dose Route Frequency Provider Last Rate Last Dose    sodium chloride 0.9 % bolus infusion 500 mL  500 mL IntraVENous ONCE Zarina Robles  mL/hr at 04/05/18 1609 500 mL at 04/05/18 1609     Current Outpatient Prescriptions   Medication Sig Dispense Refill    losartan (COZAAR) 100 mg tablet Take 100 mg by mouth daily.  cholecalciferol (VITAMIN D3) 1,000 unit tablet Take 1,000 Units by mouth daily.  isosorbide mononitrate ER (IMDUR) 60 mg CR tablet Take 60 mg by mouth every morning.  liver oil-zinc oxide ointment Apply  to affected area as needed for Skin Irritation.  aspirin delayed-release 81 mg tablet Take 81 mg by mouth daily.       atorvastatin (LIPITOR) 40 mg tablet Take 40 mg by mouth daily.  hydroCHLOROthiazide (HYDRODIURIL) 25 mg tablet Take 25 mg by mouth daily.  metFORMIN (GLUMETZA ER) 500 mg TG24 24 hour tablet Take 500 mg by mouth two (2) times a day.  metoprolol succinate (TOPROL-XL) 100 mg tablet Take 100 mg by mouth daily.  elvitegravir-cobicistat-emtricitabine-tenofovir alafenamide (GENVOYA) tab tablet Take 1 Tab by mouth daily (with breakfast). Past History     Past Medical History:  Past Medical History:   Diagnosis Date    Diabetes ([address]     Hypertension     Infectious disease     HIV in 80 from blood transfusion       Past Surgical History:  Past Surgical History:   Procedure Laterality Date    CARDIAC SURG PROCEDURE UNLIST  1995    Stent Placement    HX CHOLECYSTECTOMY      HX HEENT      cataract    HX HYSTERECTOMY      HX ORTHOPAEDIC         Family History:  Family History   Problem Relation Age of Onset    Diabetes Other     Hypertension Other        Social History:  Social History   Substance Use Topics    Smoking status: Never Smoker    Smokeless tobacco: Never Used    Alcohol use No       Allergies: Allergies   Allergen Reactions    Coumadin [Warfarin] Hives         Review of Systems   Review of Systems   Constitutional: Negative for chills and fever. HENT: Negative for congestion, rhinorrhea, sneezing and sore throat. Eyes: Negative for redness and visual disturbance. Respiratory: Negative for shortness of breath. Cardiovascular: Negative for leg swelling. Gastrointestinal: Negative for abdominal pain, nausea and vomiting. Genitourinary: Negative for difficulty urinating and frequency. Musculoskeletal: Negative for back pain, myalgias and neck stiffness. Skin: Negative for rash. Neurological: Positive for light-headedness. Negative for dizziness, syncope, weakness and headaches.        + altered mental status    Hematological: Negative for adenopathy.    All other systems reviewed and are negative. Physical Exam   Physical Exam   Constitutional: She is oriented to person, place, and time. She appears well-developed and well-nourished. HENT:   Head: Normocephalic and atraumatic. Mouth/Throat: Oropharynx is clear and moist.   Eyes: Conjunctivae and EOM are normal.   Neck: Normal range of motion and full passive range of motion without pain. Neck supple. Cardiovascular: Normal rate, S1 normal, S2 normal, normal heart sounds, intact distal pulses and normal pulses. An irregularly irregular rhythm present. No murmur heard. Pulmonary/Chest: Effort normal and breath sounds normal. No respiratory distress. She has no wheezes. Abdominal: Soft. Normal appearance and bowel sounds are normal. She exhibits no distension. There is no tenderness. There is no rebound. Musculoskeletal: Normal range of motion. Neurological: She is alert and oriented to person, place, and time. She has normal strength. Skin: Skin is warm, dry and intact. No rash noted. Psychiatric: She has a normal mood and affect. Her speech is normal and behavior is normal. Judgment and thought content normal.   Nursing note and vitals reviewed.         Diagnostic Study Results     Labs -     Recent Results (from the past 12 hour(s))   TROPONIN I    Collection Time: 04/05/18  2:53 PM   Result Value Ref Range    Troponin-I, Qt. <0.04 <0.05 ng/mL   CBC WITH AUTOMATED DIFF    Collection Time: 04/05/18  2:53 PM   Result Value Ref Range    WBC 6.2 3.6 - 11.0 K/uL    RBC 3.68 (L) 3.80 - 5.20 M/uL    HGB 10.8 (L) 11.5 - 16.0 g/dL    HCT 33.6 (L) 35.0 - 47.0 %    MCV 91.3 80.0 - 99.0 FL    MCH 29.3 26.0 - 34.0 PG    MCHC 32.1 30.0 - 36.5 g/dL    RDW 13.2 11.5 - 14.5 %    PLATELET 103 457 - 257 K/uL    MPV 10.9 8.9 - 12.9 FL    NRBC 0.0 0  WBC    ABSOLUTE NRBC 0.00 0.00 - 0.01 K/uL    NEUTROPHILS 64 32 - 75 %    LYMPHOCYTES 25 12 - 49 %    MONOCYTES 8 5 - 13 %    EOSINOPHILS 2 0 - 7 %    BASOPHILS 1 0 - 1 %    IMMATURE GRANULOCYTES 0 0.0 - 0.5 %    ABS. NEUTROPHILS 4.0 1.8 - 8.0 K/UL    ABS. LYMPHOCYTES 1.6 0.8 - 3.5 K/UL    ABS. MONOCYTES 0.5 0.0 - 1.0 K/UL    ABS. EOSINOPHILS 0.2 0.0 - 0.4 K/UL    ABS. BASOPHILS 0.0 0.0 - 0.1 K/UL    ABS. IMM. GRANS. 0.0 0.00 - 0.04 K/UL    DF AUTOMATED     METABOLIC PANEL, COMPREHENSIVE    Collection Time: 04/05/18  2:53 PM   Result Value Ref Range    Sodium 142 136 - 145 mmol/L    Potassium 4.0 3.5 - 5.1 mmol/L    Chloride 105 97 - 108 mmol/L    CO2 27 21 - 32 mmol/L    Anion gap 10 5 - 15 mmol/L    Glucose 132 (H) 65 - 100 mg/dL    BUN 23 (H) 6 - 20 MG/DL    Creatinine 1.81 (H) 0.55 - 1.02 MG/DL    BUN/Creatinine ratio 13 12 - 20      GFR est AA 32 (L) >60 ml/min/1.73m2    GFR est non-AA 27 (L) >60 ml/min/1.73m2    Calcium 8.9 8.5 - 10.1 MG/DL    Bilirubin, total 0.4 0.2 - 1.0 MG/DL    ALT (SGPT) 22 12 - 78 U/L    AST (SGOT) 22 15 - 37 U/L    Alk.  phosphatase 79 45 - 117 U/L    Protein, total 7.8 6.4 - 8.2 g/dL    Albumin 3.5 3.5 - 5.0 g/dL    Globulin 4.3 (H) 2.0 - 4.0 g/dL    A-G Ratio 0.8 (L) 1.1 - 2.2     CK W/ CKMB & INDEX    Collection Time: 04/05/18  2:53 PM   Result Value Ref Range     26 - 192 U/L    CK - MB 1.2 <3.6 NG/ML    CK-MB Index 0.7 0.0 - 2.5     URINALYSIS W/ REFLEX CULTURE    Collection Time: 04/05/18  4:07 PM   Result Value Ref Range    Color YELLOW/STRAW      Appearance CLEAR CLEAR      Specific gravity 1.010 1.003 - 1.030      pH (UA) 5.5 5.0 - 8.0      Protein NEGATIVE  NEG mg/dL    Glucose NEGATIVE  NEG mg/dL    Ketone NEGATIVE  NEG mg/dL    Bilirubin NEGATIVE  NEG      Blood NEGATIVE  NEG      Urobilinogen 0.2 0.2 - 1.0 EU/dL    Nitrites NEGATIVE  NEG      Leukocyte Esterase TRACE (A) NEG      WBC 0-4 0 - 4 /hpf    RBC 0-5 0 - 5 /hpf    Epithelial cells FEW FEW /lpf    Bacteria NEGATIVE  NEG /hpf    UA:UC IF INDICATED CULTURE NOT INDICATED BY UA RESULT CNI      Mucus 1+ (A) NEG /lpf       Radiologic Studies -   CT HEAD WO CONT   Final Result      MRI BRAIN W WO CONT    (Results Pending)   MRA NECK W CONT    (Results Pending)     CT Results  (Last 48 hours)               04/05/18 1459  CT HEAD WO CONT Final result    Impression:  IMPRESSION: Stable nonspecific white matter disease. No acute intracranial   process identified. Narrative:  EXAM:  CT HEAD WO CONT       INDICATION:   Decreased alertness; TIA symptoms for 10-15 minutes, confused and   incoherent, now at baseline, TIA ? COMPARISON: 12/26/2016. CONTRAST:  None. TECHNIQUE: Unenhanced CT of the head was performed using 5 mm images. Brain and   bone windows were generated. CT dose reduction was achieved through use of a   standardized protocol tailored for this examination and automatic exposure   control for dose modulation. Note: Some motion was present on the study. FINDINGS:   The ventricles and sulci are normal in size, shape and configuration and   midline. There is no significant white matter disease. There is a stable   hypodensity in the left clement. Mild periventricular white matter hypodensity is   present. Hypodensity is also present in both internal capsules. .  The basilar   cisterns are open. No acute infarct is identified. The bone windows demonstrate   no abnormalities. The visualized portions of the paranasal sinuses and mastoid   air cells are clear. Early left vertebral artery calcification is present. CXR Results  (Last 48 hours)    None            Medical Decision Making   I am the first provider for this patient. I reviewed the vital signs, available nursing notes, past medical history, past surgical history, family history and social history. Vital Signs-Reviewed the patient's vital signs.   Patient Vitals for the past 12 hrs:   Temp Pulse Resp BP SpO2   04/05/18 1557 - 79 17 152/72 98 %   04/05/18 1506 - 78 16 134/78 99 %   04/05/18 1403 98.2 °F (36.8 °C) 81 18 144/69 98 %       Pulse Oximetry Analysis - 98% on room air    Cardiac Monitor:   Rate: 79 bpm  Rhythm: Atrial Fibrillation     ED EKG interpretation: 14:34  Rhythm: atrial fib, incomplete RBBB; and irregular. Rate (approx.): 79; Axis: left axis deviation; QRS interval: normal ; ST/T wave: normal; Other findings: abnormal ekg. This EKG was interpreted by Brodie Evans MD,ED Provider. Records Reviewed: Nursing Notes and Old Medical Records (from [facility] outpatient notes)    Provider Notes (Medical Decision Making):   DDx: TIA, arrhythmia, anemia, UTI, electrolyte abnormality    ED Course:   Initial assessment performed. The patients presenting problems have been discussed, and they are in agreement with the care plan formulated and outlined with them. I have encouraged them to ask questions as they arise throughout their visit.    3:02 PM  Discussed plan of care with the patient's family, and they agree with plan for admission for further workup. CONSULT NOTE:   3:45 PM  Brodie Evans MD spoke with Dr Pao Prieto,   Specialty: Hospitalist  Discussed pt's hx, disposition, and available diagnostic and imaging results. Reviewed care plans. Consultant will evaluate pt for admission. Written by Cody Melgar, ED Scribe, as dictated by Brodie Evans MD.    Disposition:  3:46 PM  Patient is being admitted to the hospital. The results of their tests and reasons for their admission have been discussed with them and/or available family. They convey agreement and understanding for the need to be admitted and for their admission diagnosis. Consultation has been made with the inpatient physician specialist for hospitalization. PLAN:  1. Admit to hospitalist     Diagnosis     Clinical Impression:   1. Transient cerebral ischemia, unspecified type    2. Atrial fibrillation, unspecified type ([address]    3. Acute renal insufficiency        Attestations: This note is prepared by Cody Melgar, acting as Scribe for Brodie Evans MD.    Mayo Vega. Juan Carlos Garces MD: The scribe's documentation has been prepared under my direction and personally reviewed by me in its entirety. I confirm that the note above accurately reflects all work, treatment, procedures, and medical decision making performed by me.

## 2018-04-05 NOTE — IP AVS SNAPSHOT
[address] [facility], [place] 
 
 
 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 [address] 
[address] [place] [address] 
[phone] [address] [facility] On 4/13/2018 Your appointment is for 4/13/18 at 8:00am with Joanne Phelps Nurse Practitioner. [address] [address] 
[phone] [facility] Office on [address] [facility] [address] to contact you  [address] [address] [phone] Provider Unknown   Patient not available to ask Your Scheduled Appointments Friday April 13, 2018  1:00 PM EDT New Patient with Emma Burrell MD  
[facility] at [facility]) [facility] [address] [place] [address]  
[phone] Discharge Orders None A check nancy indicates which time of day the medication should be taken. My Medications CONTINUE taking these medications Instructions Each Dose to Equal  
 Morning Noon Evening Bedtime  
 aspirin delayed-release 81 mg tablet Your last dose was: Your next dose is: Take 81 mg by mouth daily. 81 mg  
    
   
   
   
  
 atorvastatin 40 mg tablet Commonly known as:  LIPITOR Your last dose was: Your next dose is: Take 40 mg by mouth daily. 40 mg  
    
   
   
   
  
 cholecalciferol 1,000 unit tablet Commonly known as:  VITAMIN D3 Your last dose was: Your next dose is: Take 1,000 Units by mouth daily. 1000 Units GENVOYA Tab tablet Generic drug:  elvitegravir-cobicistat-emtricitabine-tenofovir alafenamide Your last dose was: Your next dose is: Take 1 Tab by mouth daily (with breakfast). 1 Tab  
    
   
   
   
  
 hydroCHLOROthiazide 25 mg tablet Commonly known as:  HYDRODIURIL Your last dose was: Your next dose is: Take 25 mg by mouth daily. 25 mg  
    
   
   
   
  
 isosorbide mononitrate ER 60 mg CR tablet Commonly known as:  IMDUR Your last dose was: Your next dose is: Take 60 mg by mouth every morning. NEW RX on 3/2/18  not yet started. 60 mg  
    
   
   
   
  
 liver oil-zinc oxide ointment Your last dose was: Your next dose is:    
   
   
 Apply  to affected area as needed for Skin Irritation. losartan 100 mg tablet Commonly known as:  COZAAR Your last dose was: Your next dose is: Take 100 mg by mouth daily. 100 mg  
    
   
   
   
  
 metFORMIN 500 mg Tg24 24 hour tablet Commonly known asLovetta Faustino ER Your last dose was: Your next dose is: Take 500 mg by mouth two (2) times a day. 500 mg  
    
   
   
   
  
 metoprolol succinate 100 mg tablet Commonly known as:  TOPROL-XL Your last dose was: Your next dose is: Take 100 mg by mouth daily. 100 mg Discharge Instructions Transient Ischemic Attack: Care Instructions Your Care Instructions A transient ischemic attack (TIA) is when blood flow to a part of your brain is blocked for a short time. A TIA is like a stroke but usually lasts only a few minutes. A TIA does not cause lasting brain damage. Any vision problems, slurred speech, or other symptoms usually go away in 10 to 20 minutes. But they may last for up to 24 hours. TIAs are often warning signs of a stroke. Some people who have a TIA may have a stroke in the future. A stroke can cause symptoms like those of a TIA. But a stroke causes lasting damage to your brain. You can take steps to help prevent a stroke. One thing you can do is get early treatment. If you have other new symptoms, or if your symptoms do not get better, go back to the emergency room or call your doctor right away. Getting treatment right away may prevent long-term brain damage caused by a stroke. The doctor has checked you carefully, but problems can develop later. If you notice any problems or new symptoms, get medical treatment right away. Follow-up care is a key part of your treatment and safety. Be sure to make and go to all appointments, and call your doctor if you are having problems. It's also a good idea to know your test results and keep a list of the medicines you take. How can you care for yourself at home? Medicines ? · Be safe with medicines. Take your medicines exactly as prescribed. Call your doctor if you think you are having a problem with your medicine. ? · If you take a blood thinner, such as aspirin, be sure you get instructions about how to take your medicine safely. Blood thinners can cause serious bleeding problems. ? · Call your doctor if you are not able to take your medicines for any reason.   
? · Do not take any over-the-counter medicines or herbal products without talking to your doctor first.  
? · If you take birth control pills or hormone therapy, talk to your doctor. Ask if these treatments are right for you. ? Lifestyle changes ? · Do not smoke. If you need help quitting, talk to your doctor about stop-smoking programs and medicines. ? · Be active. If your doctor recommends it, get more exercise. Walking is a good choice. Bit by bit, increase the amount you walk every day. Try for at least 30 minutes on most days of the week. You also may want to swim, bike, or do other activities. ? · Eat heart-healthy foods. These include fruits, vegetables, high-fiber foods, fish, and foods that are low in sodium, saturated fat, and trans fat. ? · Stay at a healthy weight. Lose weight if you need to.  
? · Limit alcohol to 2 drinks a day for men and 1 drink a day for women. ?Staying healthy ? · Manage other health problems such as diabetes, high blood pressure, and high cholesterol. ? · Get the flu vaccine every year. When should you call for help? Call 911 anytime you think you may need emergency care. For example, call if: 
? · You have new or worse symptoms of a stroke. These may include: 
¨ Sudden numbness, tingling, weakness, or loss of movement in your face, arm, or leg, especially on only one side of your body. ¨ Sudden vision changes. ¨ Sudden trouble speaking. ¨ Sudden confusion or trouble understanding simple statements. ¨ Sudden problems with walking or balance. ¨ A sudden, severe headache that is different from past headaches. Call 911 even if these symptoms go away in a few minutes. ? · You feel like you are having another TIA. ? Watch closely for changes in your health, and be sure to contact your doctor if you have any problems. Where can you learn more? Go to http://chase-wali.info/. Enter [phone] in the search box to learn more about \"Transient Ischemic Attack: Care Instructions. \" Current as of: March 20, 2017 Content Version: 11.4 © 4212-7682 Healthwise, Incorporated.  Care instructions adapted under license by [address] (which disclaims liability or warranty for this information). If you have questions about a medical condition or this instruction, always ask your healthcare professional. [address] any warranty or liability for your use of this information. [employer] Announcement We are excited to announce that we are making your provider's discharge notes available to you in Our[employer]. You will see these notes when they are completed and signed by the physician that discharged you from your recent hospital stay. If you have any questions or concerns about any information you see in Ourcast, please call the Health Information Department where you were seen or reach out to your Primary Care Provider for more information about your plan of care. Introducing [facility] & HEALTH SERVICES! Dear Blaine Jefferson: Thank you for requesting a Ourcast account. Our records indicate that you already have an active Our[employer] account. You can access your account anytime at [email or web address]. [employer] Did you know that you can access your hospital and ER discharge instructions at any time in Our[employer]? You can also review all of your test results from your hospital stay or ER visit. Additional Information If you have questions, please visit the Frequently Asked Questions section of the [employer] website at [email or web address] [email or web address]/. Remember, Ourcast is NOT to be used for urgent needs. For medical emergencies, dial 911. Now available from your iPhone and Android! Introducing Karlos Sandoval As a [employer] patient, I wanted to make you aware of our electronic visit tool called Karlos Sandoval. [employer] 24/7 allows you to connect within minutes with a medical provider 24 hours a day, seven days a week via a mobile device or tablet or logging into a secure website from your computer.   You can access [employer] 24/7 from anywhere in the [place]. A virtual visit might be right for you when you have a simple condition and feel like you just dont want to get out of bed, or cant get away from work for an appointment, when your regular [facility] provider is not available (evenings, weekends or holidays), or when youre out of town and need minor care. Electronic visits cost only $49 and if the [employer] 24/7 provider determines a prescription is needed to treat your condition, one can be electronically transmitted to a nearby pharmacy*. Please take a moment to enroll today if you have not already done so. The enrollment process is free and takes just a few minutes. To enroll, please download the [employer] 24/7 tavo to your tablet or phone, or visit [email or web address] to enroll on your computer. And, as an [address] [place] [address] patient with a [employer] account, the results of your visits will be scanned into your electronic medical record and your primary care provider will be able to view the scanned results. We urge you to continue to see your regular [facility] provider for your ongoing medical care. And while your primary care provider may not be the one available when you seek a [employer] virtual visit, the peace of mind you get from getting a real diagnosis real time can be priceless. For more information on [employer], view our Frequently Asked Questions (FAQs) at [email or web address]. Sincerely, 
 
José Luis Paul MD 
Chief Medical Officer [address] *:  certain medications cannot be prescribed via [employer] Providers Seen During Your Hospitalization Provider Specialty Primary office phone Derrick Escalona MD Emergency Medicine [phone] Catrachito Garcia MD Hospitalist [phone] Your Primary Care Physician (PCP) Primary Care Physician Office Phone Office Fax UNKNOWN, PROVIDER ** None ** ** None ** You are allergic to the following Allergen Reactions Coumadin (Warfarin) Hives Recent Documentation Height Weight BMI OB Status Smoking Status 1.575 m 109.5 kg 44.13 kg/m2 Hysterectomy Never Smoker Emergency Contacts Name Discharge Info Relation Home Work Mobile Nisa Rao DISCHARGE CAREGIVER [3] Child [2] [phone] Patient Belongings The following personal items are in your possession at time of discharge: 
  Dental Appliances: None  Visual Aid: None      Home Medications: None   Jewelry: None  Clothing: Dress, Footwear, Jacket/Coat, Shirt, Socks, Undergarments    Other Valuables: Cell Phone, Vergie Late Please provide this summary of care documentation to your next provider. Signatures-by signing, you are acknowledging that this After Visit Summary has been reviewed with you and you have received a copy. Patient Signature:  ____________________________________________________________ Date:  ____________________________________________________________  
  
Erin Horton Provider Signature:  ____________________________________________________________ Date:  ____________________________________________________________

## 2018-04-05 NOTE — PROGRESS NOTES
Pharmacy Medication Reconciliation     Recommendations/Findings:   Patient stated that she did not yet start Imdur 60 mg po daily. This is a new prescription.      -Clarified PTA med list with Rx query and patient interview. PTA medication list was corrected to the following:     Prior to Admission Medications   Prescriptions Last Dose Informant Patient Reported? Taking?   aspirin delayed-release 81 mg tablet 4/5/2018 Other Yes Yes   Sig: Take 81 mg by mouth daily. atorvastatin (LIPITOR) 40 mg tablet 4/5/2018 Other Yes Yes   Sig: Take 40 mg by mouth daily. cholecalciferol (VITAMIN D3) 1,000 unit tablet 4/5/2018 Other Yes Yes   Sig: Take 1,000 Units by mouth daily. elvitegravir-cobicistat-emtricitabine-tenofovir alafenamide (GENVOYA) tab tablet 4/5/2018 Other Yes Yes   Sig: Take 1 Tab by mouth daily (with breakfast). hydroCHLOROthiazide (HYDRODIURIL) 25 mg tablet 4/5/2018 Other Yes Yes   Sig: Take 25 mg by mouth daily. isosorbide mononitrate ER (IMDUR) 60 mg CR tablet Not Taking at Unknown time Other Yes No   Sig: Take 60 mg by mouth every morning. NEW RX on 3/2/18  not yet started. liver oil-zinc oxide ointment 4/5/2018 Other Yes Yes   Sig: Apply  to affected area as needed for Skin Irritation. losartan (COZAAR) 100 mg tablet 4/5/2018 Other Yes Yes   Sig: Take 100 mg by mouth daily. metFORMIN (GLUMETZA ER) 500 mg TG24 24 hour tablet 4/5/2018 Other Yes Yes   Sig: Take 500 mg by mouth two (2) times a day. metoprolol succinate (TOPROL-XL) 100 mg tablet 4/5/2018 Other Yes Yes   Sig: Take 100 mg by mouth daily.       Facility-Administered Medications: None          Thank you,  Say Manning, [facility]

## 2018-04-05 NOTE — PROGRESS NOTES
TRANSFER - IN REPORT:    Verbal report received from Melody Morrison RN(name) on Mary Rand  being received from ED (unit) for routine progression of care      Report consisted of patients Situation, Background, Assessment and   Recommendations(SBAR). Information from the following report(s) SBAR, Kardex, ED Summary, Intake/Output, MAR, Accordion and Cardiac Rhythm Paced was reviewed with the receiving nurse. Opportunity for questions and clarification was provided. Assessment completed upon patients arrival to unit and care assumed.

## 2018-04-05 NOTE — IP AVS SNAPSHOT
[address] [facility] 
 
 
 [address] Patient: Hollie Dickerson MRN: [id number] JGU:[id number] A check nancy indicates which time of day the medication should be taken. My Medications CONTINUE taking these medications Instructions Each Dose to Equal  
 Morning Noon Evening Bedtime  
 aspirin delayed-release 81 mg tablet Your last dose was: Your next dose is: Take 81 mg by mouth daily. 81 mg  
    
   
   
   
  
 atorvastatin 40 mg tablet Commonly known as:  LIPITOR Your last dose was: Your next dose is: Take 40 mg by mouth daily. 40 mg  
    
   
   
   
  
 cholecalciferol 1,000 unit tablet Commonly known as:  VITAMIN D3 Your last dose was: Your next dose is: Take 1,000 Units by mouth daily. 1000 Units GENVOYA Tab tablet Generic drug:  elvitegravir-cobicistat-emtricitabine-tenofovir alafenamide Your last dose was: Your next dose is: Take 1 Tab by mouth daily (with breakfast). 1 Tab  
    
   
   
   
  
 hydroCHLOROthiazide 25 mg tablet Commonly known as:  HYDRODIURIL Your last dose was: Your next dose is: Take 25 mg by mouth daily. 25 mg  
    
   
   
   
  
 isosorbide mononitrate ER 60 mg CR tablet Commonly known as:  IMDUR Your last dose was: Your next dose is: Take 60 mg by mouth every morning. NEW RX on 3/2/18  not yet started. 60 mg  
    
   
   
   
  
 liver oil-zinc oxide ointment Your last dose was: Your next dose is:    
   
   
 Apply  to affected area as needed for Skin Irritation. losartan 100 mg tablet Commonly known as:  COZAAR Your last dose was: Your next dose is: Take 100 mg by mouth daily.   
 100 mg  
    
   
   
   
  
 metFORMIN 500 mg Tg24 24 hour tablet Commonly known asLugene Him ER Your last dose was: Your next dose is: Take 500 mg by mouth two (2) times a day. 500 mg  
    
   
   
   
  
 metoprolol succinate 100 mg tablet Commonly known as:  TOPROL-XL Your last dose was: Your next dose is: Take 100 mg by mouth daily.   
 100 mg

## 2018-04-05 NOTE — PROGRESS NOTES
Bedside shift change report given to Roberto Carlos Dunn (oncoming nurse) by Della Lin, RN and Benedict Martinez, Student Nurse (offgoing nurse). Report included the following information SBAR, Kardex, ED Summary, Intake/Output, MAR, Accordion and Recent Results.

## 2018-04-06 ENCOUNTER — APPOINTMENT (OUTPATIENT)
Dept: MRI IMAGING | Age: 83
DRG: 101 | End: 2018-04-06
Attending: INTERNAL MEDICINE
Payer: MEDICARE

## 2018-04-06 VITALS
HEART RATE: 67 BPM | SYSTOLIC BLOOD PRESSURE: 128 MMHG | DIASTOLIC BLOOD PRESSURE: 58 MMHG | TEMPERATURE: 97.1 F | RESPIRATION RATE: 18 BRPM | BODY MASS INDEX: 44.4 KG/M2 | HEIGHT: 62 IN | WEIGHT: 241.3 LBS | OXYGEN SATURATION: 98 %

## 2018-04-06 PROBLEM — Z86.69 HISTORY OF SEIZURE DISORDER: Status: ACTIVE | Noted: 2018-04-06

## 2018-04-06 PROBLEM — B20 HIV (HUMAN IMMUNODEFICIENCY VIRUS INFECTION) (HCC): Status: ACTIVE | Noted: 2018-04-06

## 2018-04-06 PROBLEM — I10 ESSENTIAL HYPERTENSION: Status: ACTIVE | Noted: 2018-04-06

## 2018-04-06 PROBLEM — Z95.820 S/P ANGIOPLASTY WITH STENT: Status: ACTIVE | Noted: 2018-04-06

## 2018-04-06 PROBLEM — E11.65 TYPE 2 DIABETES MELLITUS WITH HYPERGLYCEMIA, WITHOUT LONG-TERM CURRENT USE OF INSULIN (HCC): Status: ACTIVE | Noted: 2018-04-06

## 2018-04-06 PROBLEM — I49.8 WANDERING ATRIAL PACEMAKER BY ELECTROCARDIOGRAM: Status: ACTIVE | Noted: 2018-04-05

## 2018-04-06 LAB
ANION GAP SERPL CALC-SCNC: 9 MMOL/L (ref 5–15)
ATRIAL RATE: 82 BPM
BUN SERPL-MCNC: 21 MG/DL (ref 6–20)
BUN/CREAT SERPL: 14 (ref 12–20)
CALCIUM SERPL-MCNC: 8.5 MG/DL (ref 8.5–10.1)
CALCULATED R AXIS, ECG10: -33 DEGREES
CALCULATED T AXIS, ECG11: 82 DEGREES
CHLORIDE SERPL-SCNC: 106 MMOL/L (ref 97–108)
CHOLEST SERPL-MCNC: 113 MG/DL
CO2 SERPL-SCNC: 27 MMOL/L (ref 21–32)
CREAT SERPL-MCNC: 1.45 MG/DL (ref 0.55–1.02)
DIAGNOSIS, 93000: NORMAL
ERYTHROCYTE [DISTWIDTH] IN BLOOD BY AUTOMATED COUNT: 12.8 % (ref 11.5–14.5)
EST. AVERAGE GLUCOSE BLD GHB EST-MCNC: 183 MG/DL
GLUCOSE BLD STRIP.AUTO-MCNC: 105 MG/DL (ref 65–100)
GLUCOSE BLD STRIP.AUTO-MCNC: 112 MG/DL (ref 65–100)
GLUCOSE BLD STRIP.AUTO-MCNC: 163 MG/DL (ref 65–100)
GLUCOSE SERPL-MCNC: 119 MG/DL (ref 65–100)
HBA1C MFR BLD: 8 % (ref 4.2–6.3)
HCT VFR BLD AUTO: 33.4 % (ref 35–47)
HDLC SERPL-MCNC: 50 MG/DL
HDLC SERPL: 2.3 {RATIO} (ref 0–5)
HGB BLD-MCNC: 10.5 G/DL (ref 11.5–16)
LDLC SERPL CALC-MCNC: 46.8 MG/DL (ref 0–100)
LIPID PROFILE,FLP: NORMAL
MCH RBC QN AUTO: 29.1 PG (ref 26–34)
MCHC RBC AUTO-ENTMCNC: 31.4 G/DL (ref 30–36.5)
MCV RBC AUTO: 92.5 FL (ref 80–99)
NRBC # BLD: 0 K/UL (ref 0–0.01)
NRBC BLD-RTO: 0 PER 100 WBC
PLATELET # BLD AUTO: 214 K/UL (ref 150–400)
PMV BLD AUTO: 10.1 FL (ref 8.9–12.9)
POTASSIUM SERPL-SCNC: 3.5 MMOL/L (ref 3.5–5.1)
Q-T INTERVAL, ECG07: 394 MS
QRS DURATION, ECG06: 100 MS
QTC CALCULATION (BEZET), ECG08: 451 MS
RBC # BLD AUTO: 3.61 M/UL (ref 3.8–5.2)
SERVICE CMNT-IMP: ABNORMAL
SODIUM SERPL-SCNC: 142 MMOL/L (ref 136–145)
TRIGL SERPL-MCNC: 81 MG/DL (ref ?–150)
VENTRICULAR RATE, ECG03: 79 BPM
VLDLC SERPL CALC-MCNC: 16.2 MG/DL
WBC # BLD AUTO: 6.5 K/UL (ref 3.6–11)

## 2018-04-06 PROCEDURE — 96361 HYDRATE IV INFUSION ADD-ON: CPT

## 2018-04-06 PROCEDURE — 80061 LIPID PANEL: CPT | Performed by: INTERNAL MEDICINE

## 2018-04-06 PROCEDURE — 85027 COMPLETE CBC AUTOMATED: CPT | Performed by: INTERNAL MEDICINE

## 2018-04-06 PROCEDURE — 74011636637 HC RX REV CODE- 636/637: Performed by: INTERNAL MEDICINE

## 2018-04-06 PROCEDURE — 74011250636 HC RX REV CODE- 250/636

## 2018-04-06 PROCEDURE — G8979 MOBILITY GOAL STATUS: HCPCS | Performed by: PHYSICAL THERAPIST

## 2018-04-06 PROCEDURE — A9585 GADOBUTROL INJECTION: HCPCS | Performed by: RADIOLOGY

## 2018-04-06 PROCEDURE — G8980 MOBILITY D/C STATUS: HCPCS | Performed by: PHYSICAL THERAPIST

## 2018-04-06 PROCEDURE — 99218 HC RM OBSERVATION: CPT

## 2018-04-06 PROCEDURE — 74011000258 HC RX REV CODE- 258: Performed by: RADIOLOGY

## 2018-04-06 PROCEDURE — G8988 SELF CARE GOAL STATUS: HCPCS

## 2018-04-06 PROCEDURE — G8978 MOBILITY CURRENT STATUS: HCPCS | Performed by: PHYSICAL THERAPIST

## 2018-04-06 PROCEDURE — 92610 EVALUATE SWALLOWING FUNCTION: CPT

## 2018-04-06 PROCEDURE — 74011250636 HC RX REV CODE- 250/636: Performed by: RADIOLOGY

## 2018-04-06 PROCEDURE — 70544 MR ANGIOGRAPHY HEAD W/O DYE: CPT

## 2018-04-06 PROCEDURE — 97116 GAIT TRAINING THERAPY: CPT | Performed by: PHYSICAL THERAPIST

## 2018-04-06 PROCEDURE — 80048 BASIC METABOLIC PNL TOTAL CA: CPT | Performed by: INTERNAL MEDICINE

## 2018-04-06 PROCEDURE — 36415 COLL VENOUS BLD VENIPUNCTURE: CPT | Performed by: INTERNAL MEDICINE

## 2018-04-06 PROCEDURE — C8929 TTE W OR WO FOL WCON,DOPPLER: HCPCS

## 2018-04-06 PROCEDURE — 70548 MR ANGIOGRAPHY NECK W/DYE: CPT

## 2018-04-06 PROCEDURE — 97165 OT EVAL LOW COMPLEX 30 MIN: CPT

## 2018-04-06 PROCEDURE — 74011250636 HC RX REV CODE- 250/636: Performed by: INTERNAL MEDICINE

## 2018-04-06 PROCEDURE — 96372 THER/PROPH/DIAG INJ SC/IM: CPT

## 2018-04-06 PROCEDURE — 74011250637 HC RX REV CODE- 250/637: Performed by: INTERNAL MEDICINE

## 2018-04-06 PROCEDURE — 83036 HEMOGLOBIN GLYCOSYLATED A1C: CPT | Performed by: INTERNAL MEDICINE

## 2018-04-06 PROCEDURE — 96374 THER/PROPH/DIAG INJ IV PUSH: CPT

## 2018-04-06 PROCEDURE — 82962 GLUCOSE BLOOD TEST: CPT

## 2018-04-06 PROCEDURE — 70553 MRI BRAIN STEM W/O & W/DYE: CPT

## 2018-04-06 PROCEDURE — 97161 PT EVAL LOW COMPLEX 20 MIN: CPT | Performed by: PHYSICAL THERAPIST

## 2018-04-06 PROCEDURE — G8987 SELF CARE CURRENT STATUS: HCPCS

## 2018-04-06 RX ORDER — SODIUM CHLORIDE 0.9 % (FLUSH) 0.9 %
10 SYRINGE (ML) INJECTION AS NEEDED
Status: DISCONTINUED | OUTPATIENT
Start: 2018-04-06 | End: 2018-04-06 | Stop reason: HOSPADM

## 2018-04-06 RX ORDER — LORAZEPAM 2 MG/ML
1 INJECTION INTRAMUSCULAR ONCE
Status: COMPLETED | OUTPATIENT
Start: 2018-04-06 | End: 2018-04-06

## 2018-04-06 RX ORDER — SODIUM CHLORIDE 9 MG/ML
50 INJECTION, SOLUTION INTRAVENOUS
Status: COMPLETED | OUTPATIENT
Start: 2018-04-06 | End: 2018-04-06

## 2018-04-06 RX ORDER — SODIUM CHLORIDE 0.9 % (FLUSH) 0.9 %
SYRINGE (ML) INJECTION
Status: DISCONTINUED
Start: 2018-04-06 | End: 2018-04-06 | Stop reason: HOSPADM

## 2018-04-06 RX ADMIN — Medication 10 ML: at 14:26

## 2018-04-06 RX ADMIN — GADOBUTROL 10 ML: 604.72 INJECTION INTRAVENOUS at 09:36

## 2018-04-06 RX ADMIN — ATORVASTATIN CALCIUM 40 MG: 40 TABLET, FILM COATED ORAL at 08:31

## 2018-04-06 RX ADMIN — Medication 10 ML: at 12:21

## 2018-04-06 RX ADMIN — ELVITEGRAVIR, COBICISTAT, EMTRICITABINE, AND TENOFOVIR ALAFENAMIDE 1 TABLET: 150; 150; 200; 10 TABLET ORAL at 10:35

## 2018-04-06 RX ADMIN — Medication 10 ML: at 12:20

## 2018-04-06 RX ADMIN — INSULIN LISPRO 2 UNITS: 100 INJECTION, SOLUTION INTRAVENOUS; SUBCUTANEOUS at 12:56

## 2018-04-06 RX ADMIN — SODIUM CHLORIDE 50 ML: 900 INJECTION, SOLUTION INTRAVENOUS at 09:37

## 2018-04-06 RX ADMIN — ISOSORBIDE MONONITRATE 60 MG: 30 TABLET, EXTENDED RELEASE ORAL at 13:01

## 2018-04-06 RX ADMIN — ENOXAPARIN SODIUM 40 MG: 100 INJECTION SUBCUTANEOUS at 01:15

## 2018-04-06 RX ADMIN — METOPROLOL SUCCINATE 100 MG: 50 TABLET, EXTENDED RELEASE ORAL at 08:36

## 2018-04-06 RX ADMIN — SODIUM CHLORIDE 75 ML/HR: 900 INJECTION, SOLUTION INTRAVENOUS at 14:27

## 2018-04-06 RX ADMIN — Medication 10 ML: at 12:00

## 2018-04-06 RX ADMIN — VITAMIN D 1000 UNITS: 25 TAB ORAL at 08:30

## 2018-04-06 RX ADMIN — LORAZEPAM 1 MG: 2 INJECTION, SOLUTION INTRAMUSCULAR; INTRAVENOUS at 08:25

## 2018-04-06 RX ADMIN — ASPIRIN 81 MG: 81 TABLET, COATED ORAL at 08:32

## 2018-04-06 RX ADMIN — PERFLUTREN 0.5 ML: 6.52 INJECTION, SUSPENSION INTRAVENOUS at 12:19

## 2018-04-06 NOTE — INTERDISCIPLINARY ROUNDS
IDR with Dr. Tiago Romeo (MD), Dr. Marissa Solorzano (palliative MD), Mitchell Merritt (pharmacist), Deedee Macias (), Chuck Puckett (nurse manager), Tamara Mattson (wound care), Gypsy (RN), and Luis Melo (RN) to discuss plan of care including MRI, advanced directives.

## 2018-04-06 NOTE — PROGRESS NOTES
Following for coordination of services. RRAT Score  17. Patient is known to us from previous admit. Discussed in rounds this am with MD, team , patient and family at bedside this am.  Met with patient and daughter again. Patient is from home. [place] on [address] [place] [address]. She lives alone and family checks on her frequently. She gets her primary care at [facility]. MD recommend for Neurology follow-up. They requested provider her. Appointment on AVS  Agreed for PCP appointment. To see NP. Appointment on AVS    Patient inpatient status. Issue Second Medicare Letter. Copy on chart. Patient gets her medication filled at [facility] on [address] [facility]. Family to transport home at discharge. They will take her to her appointments. No needs for Home Health at this time. DME at home : Patient has rollator walker, cane, commode, shower chair, and grab bars at home. Care Management Interventions  PCP Verified by CM: Yes  Palliative Care Criteria Met (RRAT>21 & CHF Dx)?: No  Mode of Transport at Discharge:  Other (see comment) (Children)  Transition of Care Consult (CM Consult): Discharge Planning  Health Maintenance Reviewed: Yes  Physical Therapy Consult: Yes  Occupational Therapy Consult: Yes  Speech Therapy Consult: Yes  Current Support Network: Lives Alone (Elderly high rise)  Confirm Follow Up Transport: Other (see comment) (Children to transport to medical appointments)  Plan discussed with Pt/Family/Caregiver: Yes (Talked to patient and daughter, [facility]NGEL [facility])  [place] of Choice Offered: Yes  Discharge Location  Discharge Placement: Home with outpatient services (PCP Specialist)    Follow-up Information      Follow up With Details Comments Contact Info     Mane Dougherty MD On 4/13/2018 Your appointment is on 4/13/18 at 12:30pm. [address]   [place] [address] On 4/13/2018 Your appointment is for 4/13/18 at 8:00am with Zoey Montalvo, Nurse Practitioner.  [address] [place] [address]

## 2018-04-06 NOTE — PROGRESS NOTES
Problem: Falls - Risk of  Goal: *Absence of Falls  Document Samuel Fall Risk and appropriate interventions in the flowsheet.    Outcome: Progressing Towards Goal  Fall Risk Interventions:            Medication Interventions: Teach patient to arise slowly

## 2018-04-06 NOTE — PROGRESS NOTES
Bedside shift change report given to JAIRON Betancur (oncoming nurse) by Toya Ybarra (offgoing nurse). Report included the following information SBAR, Kardex, ED Summary, MAR, Recent Results and Cardiac Rhythm A-FIB.

## 2018-04-06 NOTE — DISCHARGE INSTRUCTIONS
Transient Ischemic Attack: Care Instructions  Your Care Instructions    A transient ischemic attack (TIA) is when blood flow to a part of your brain is blocked for a short time. A TIA is like a stroke but usually lasts only a few minutes. A TIA does not cause lasting brain damage. Any vision problems, slurred speech, or other symptoms usually go away in 10 to 20 minutes. But they may last for up to 24 hours. TIAs are often warning signs of a stroke. Some people who have a TIA may have a stroke in the future. A stroke can cause symptoms like those of a TIA. But a stroke causes lasting damage to your brain. You can take steps to help prevent a stroke. One thing you can do is get early treatment. If you have other new symptoms, or if your symptoms do not get better, go back to the emergency room or call your doctor right away. Getting treatment right away may prevent long-term brain damage caused by a stroke. The doctor has checked you carefully, but problems can develop later. If you notice any problems or new symptoms, get medical treatment right away. Follow-up care is a key part of your treatment and safety. Be sure to make and go to all appointments, and call your doctor if you are having problems. It's also a good idea to know your test results and keep a list of the medicines you take. How can you care for yourself at home? Medicines  ? · Be safe with medicines. Take your medicines exactly as prescribed. Call your doctor if you think you are having a problem with your medicine. ? · If you take a blood thinner, such as aspirin, be sure you get instructions about how to take your medicine safely. Blood thinners can cause serious bleeding problems. ? · Call your doctor if you are not able to take your medicines for any reason.    ? · Do not take any over-the-counter medicines or herbal products without talking to your doctor first.   ? · If you take birth control pills or hormone therapy, talk to your doctor. Ask if these treatments are right for you. ? Lifestyle changes  ? · Do not smoke. If you need help quitting, talk to your doctor about stop-smoking programs and medicines. ? · Be active. If your doctor recommends it, get more exercise. Walking is a good choice. Bit by bit, increase the amount you walk every day. Try for at least 30 minutes on most days of the week. You also may want to swim, bike, or do other activities. ? · Eat heart-healthy foods. These include fruits, vegetables, high-fiber foods, fish, and foods that are low in sodium, saturated fat, and trans fat. ? · Stay at a healthy weight. Lose weight if you need to.   ? · Limit alcohol to 2 drinks a day for men and 1 drink a day for women. ?Staying healthy  ? · Manage other health problems such as diabetes, high blood pressure, and high cholesterol. ? · Get the flu vaccine every year. When should you call for help? Call 911 anytime you think you may need emergency care. For example, call if:  ? · You have new or worse symptoms of a stroke. These may include:  ¨ Sudden numbness, tingling, weakness, or loss of movement in your face, arm, or leg, especially on only one side of your body. ¨ Sudden vision changes. ¨ Sudden trouble speaking. ¨ Sudden confusion or trouble understanding simple statements. ¨ Sudden problems with walking or balance. ¨ A sudden, severe headache that is different from past headaches. Call 911 even if these symptoms go away in a few minutes. ? · You feel like you are having another TIA. ? Watch closely for changes in your health, and be sure to contact your doctor if you have any problems. Where can you learn more? Go to http://chase-wali.info/. Enter [phone] in the search box to learn more about \"Transient Ischemic Attack: Care Instructions. \"  Current as of: March 20, 2017  Content Version: 11.4  © 5174-7476 [employer], [employer].  Care instructions adapted under license by [employer] (which disclaims liability or warranty for this information). If you have questions about a medical condition or this instruction, always ask your healthcare professional. [address] any warranty or liability for your use of this information.

## 2018-04-06 NOTE — PROGRESS NOTES
Spiritual Care Assessment/Progress Note  [facility]      NAME: Galina Patiño      MRN: [id number]  AGE: 80 y.o.  SEX: female  Buddhism Affiliation: Seventh day Mandaen   Language: English     4/6/2018     Total Time (in minutes): 30     Spiritual Assessment begun in [address] through conversation with:         [x]Patient        [x] Family    [] Friend(s)        Reason for Consult: Advance medical directive consult     Spiritual beliefs: (Please include comment if needed)     [x] Involved in a louann tradition/spiritual practice:     [] Supported by a louann community:      [] Claims no spiritual orientation:      [] Seeking spiritual identity:           [] Adheres to an individual form of spirituality:      [] Not able to assess:                     Identified resources for coping:      [] Prayer                  [] Devotional reading               [] Music                  [] Guided Imagery     [x] Family/friends                 [] Pet visits     [] Other:        Interventions offered during this visit: (See comments for more details)    Patient Interventions: Advance medical directive consult, End of life issues discussed, Initial/Spiritual assessment, patient floor, Coping skills reviewed/reinforced, Affirmation of emotions/emotional suffering, Iconic (affirming the presence of God/Higher Power)     Family/Friend(s): Advance medical directive consult     Plan of Care:     [] Discuss Spiritual/Cultural needs    [x] Support AMD and/or advance care planning process      [] Support grieving process   [] Coordinate Rites/Rituals    [] Coordination with community clergy   [] No spiritual needs identified at this time   [] Detailed Plan of Care below (See Comments)  [] Make referral to Music Therapy  [] Make referral to Pet Therapy     [] Make referral to Addiction services  [] Make referral to [facility]  [] Make referral to Spiritual Care Partner  [] No future visits requested Responded to call informing of pt's interest in AMD form. Upon arrival met pt along with two daughters. Daughter Harjinder Marie indicated that she has a copy of pt's POA and believes the AMD information is within that document. Pt's other daughter Vance White recently retired from [employer] working with [employer] and is very familiar with AMD forms. Harjinder Marie stated that she would like at [facility] and if AMD is needed, daughters will assist pt in filling out. Provided AMD form and informational booklet. Patient joking and jovial throughout visit. No needs expressed at this time. Hopes to be going home tonight. Will follow up as needed. RAINER Younger, Sammy Gordon. Lasha Arriola

## 2018-04-06 NOTE — PROGRESS NOTES
..Stroke Education provided to patient and relative(s) and the following topics were discussed    1. Patients personal risk factors for stroke are hypertension, carotid stenosis, diabetes mellitus, HgA1C, obesity, prior stroke and Other Age, Gender, and race. 2. Warning signs of Stroke:        * Sudden numbness or weakness of the face, arm or leg, especially on one side of          The body            * Sudden confusion, trouble speaking or understanding        * Sudden trouble seeing in one or both eyes        * Sudden trouble walking, dizziness, loss of balance or coordination        * Sudden severe headache with no known cause      3. Importance of activation Emergency Medical Services ( 9-1-1 ) immediately if experience any warning signs of stroke. 4. Be sure and schedule a follow-up appointment with your primary care doctor or any specialists as instructed. 5. You must take medicine every day to treat your risk factors for stroke. Be sure to take your medicines exactly as your doctor tells you: no more, no less. Know what your medicines are for , what they do. Anti-thrombotics /anticoagulants can help prevent strokes. You are taking the following medicine(s)  ASA, Lipitor, metapropolol, and Imdur. 6.  Smoking and second-hand smoke greatly increase your risk of stroke, cardiovascular disease and death. Smoking history never    7. Information provided was [facility] Stroke Education Binder, Stroke Handouts or Verbal Education    8. Documentation of teaching completed in Patient Education Activity and on Care Plan with teaching response noted?   yes

## 2018-04-06 NOTE — PROGRESS NOTES
0.5ml iv definity given after mixing per protocol for echo enhancement, iv remains patent after ns flush

## 2018-04-06 NOTE — PROGRESS NOTES
Problem: Falls - Risk of  Goal: *Absence of Falls  Document Samuel Fall Risk and appropriate interventions in the flowsheet.    Outcome: Progressing Towards Goal  Fall Risk Interventions:  Mobility Interventions: Patient to call before getting OOB         Medication Interventions: Teach patient to arise slowly

## 2018-04-06 NOTE — PROGRESS NOTES
Initial Neurology Evaluation - Preliminary Note    Patient seen, chart reviewed and neurology consultation report dictated. Impression:  AMS  TIA  R/o complex partial seizure  Presthesia  Plan:  Brain MRI was unremarkable. Continue current management  Patient may be discharged if medically cleared. Follow up with me in 2 weeks. Will follow patient's course along with you as necessary. Thank you for the opportunity to participate in the care of your patient.     Rivera Steven MD  4/6/2018

## 2018-04-06 NOTE — PROGRESS NOTES
Occupational Therapy neurological EVALUATION with discharge  Patient: Tomas Hernández (75 y.o. female)  Date: 4/6/2018  Primary Diagnosis: TIA (transient ischemic attack)  TIA (transient ischemic attack)  New onset a-fib ([facility] [address]  MERLIN (acute kidney injury) ([facility] [address]        Precautions: universal       ASSESSMENT:  Based on the objective data described below, the patient presents with resolved issue for work up for confusion and staring spells which have resolved. She presents at baseline for basic ADL tasks. Nursing cleared for therapy. Patient received on toilet in bathroom with daughter assisting with IV pole, completed at mod indep. Mild LOB noted, patient able to self correct. Vision WDL, Fugl walker 66/66 for BUE. She lives home in apartment alone with support from family. Use of rollator or furniture walking at baseline. Provided BE FAST education with excellent understanding. Further skilled acute occupational therapy is not indicated at this time. Patient and daughters in agreement. Discharge Recommendations: None  Further Equipment Recommendations for Discharge: has all needed DME     SUBJECTIVE:   Patient stated I am better now.     OBJECTIVE DATA SUMMARY:   HISTORY:   Past Medical History:   Diagnosis Date    Diabetes ([facility] [address]     Hypertension     Infectious disease     HIV in 80 from blood transfusion     Past Surgical History:   Procedure Laterality Date    CARDIAC SURG PROCEDURE UNLIST  1995    Stent Placement    HX CHOLECYSTECTOMY      HX HEENT      cataract    HX HYSTERECTOMY      HX ORTHOPAEDIC         Prior Level of Function/Environment/Context: Home alone. Senior apt. Indep- mod indep ADL tasks. Use of rollator/furniture walk. Rides motor scooter for errands. Daughters in area and supportive.        Home Situation  Home Environment: Private residence  # Steps to Enter:  (elevator)  One/Two Story Residence: Two story  Living Alone: Yes  Support Systems: Family member(s), [facility] Campa / [facility] community  Patient Expects to be Discharged to[de-identified] Private residence  Current DME Used/Available at Home: Rosa Bradley, rollator, Wheelchair, power, Glucometer, Blood pressure cuff, Shower chair, Grab bars  Tub or Shower Type: Shower  [x]  Right hand dominant   []  Left hand dominant    EXAMINATION OF PERFORMANCE DEFICITS:  Cognitive/Behavioral Status:  Neurologic State: Alert  Orientation Level: Oriented X4  Cognition: Appropriate decision making; Appropriate for age attention/concentration; Appropriate safety awareness  Perception: Appears intact  Perseveration: No perseveration noted  Safety/Judgement: Decreased insight into deficits    Skin: BUE WDL    Edema: BUE WDL    Hearing: Auditory  Auditory Impairment: None    Vision/Perceptual:                           Acuity: Within Defined Limits;Able to read clock/calendar on wall without difficulty    Corrective Lenses: Reading glasses    Range of Motion:  AROM: Generally decreased, functional  PROM: Generally decreased, functional                      Strength:  Strength: Generally decreased, functional                Coordination:     Fine Motor Skills-Upper: Left Intact; Right Intact    Gross Motor Skills-Upper: Left Intact; Right Intact    Tone & Sensation:  Tone: Normal  Sensation: Intact          Balance:  Sitting: Intact  Standing: Intact; Without support    Functional Mobility and Transfers for ADLs:  Bed Mobility:  Scooting: Independent    Transfers:  Sit to Stand: Modified independent  Functional Transfers  Toilet Transfer : Modified independent        ADL Assessment:  Feeding: Independent  Oral Facial Hygiene/Grooming: Independent  Bathing: Supervision  Upper Body Dressing: Independent  Lower Body Dressing: Modified independent; Additional time  Toileting: Modified independent                ADL Intervention and task modifications:       Grooming  Washing Hands: Modified independent    Toileting  Bladder Hygiene: Modified independent    Cognitive Retraining  Safety/Judgement: Decreased insight into deficits      Functional Measure:   Fugl-Dill Assessment of Motor Recovery after Stroke:     Reflex Activity  Flexors/Biceps/Fingers: Can be elicited  Extensors/Triceps: Can be elicited  Reflex Subtotal: 4    Volitional Movement Within Synergies  Shoulder Retraction: Full  Shoulder Elevation: Full  Shoulder Abduction (90 degrees): Full  Shoulder External Rotation: Full  Elbow Flexion: Full  Forearm Supination: Full  Shoulder Adduction/Internal Rotation: Full  Elbow Extension: Full  Forearm Pronation: Full  Subtotal: 18    Volitional Movement Mixing Synergies  Hand to Lumbar Spine: Full  Shoulder Flexion (0-90 degrees): Full  Pronation-Supination: Full  Subtotal: 6    Volitional Movement With Little or No Synergy  Shoulder Abduction (0-90 degrees): Full  Shoulder Flexion ( degrees): Full  Pronation/Supination: Full  Subtotal : 6    Normal Reflex Activity  Biceps, Triceps, Finger Flexors: Full  Subtotal : 2    Upper Extremity Total   Upper Extremity Total: 36    Wrist  Stability at 15 Degree Dorsiflexion: Full  Repeated Dorsiflexion/ Volar Flexion: Full  Stability at 15 Degree Dorsiflexion: Full  Repeated Dorsiflexion/ Volar Flexion: Full  Circumduction: Full  Wrist Total: 10    Hand  Mass Flexion: Full  Mass Extension: Full  Grasp A: Full  Grasp B: Full  Grasp C: Full  Grasp D: Full  Grasp E: Full  Hand Total: 14    Coordination/Speed  Tremor: None  Dysmetria: None  Time: <1s  Coordination/Speed Total : 6    Total A-D  Total A-D (Motor Function): 66/66     Percentage of impairment CH  0% CI  1-19% CJ  20-39% CK  40-59% CL  60-79% CM  80-99% CN  100%   Fugl-Dill score: 0-66 66 53-65 39-52 26-38 13-25 1-12   0      This is a reliable/valid measure of arm function after a neurological event. It has established value to characterize functional status and for measuring spontaneous and therapy-induced recovery; tests proximal and distal motor functions. Fugl-Dill Assessment  UE scores recorded between five and 30 days post neurologic event can be used to predict UE recovery at six months post neurologic event. Severe = 0-21 points   Moderately Severe = 22-33 points   Moderate = 34-47 points   Mild = 48-66 points  MAYE Son, DASHAWN Mondragon, & CATHERINE Baker (1992). Measurement of motor recovery after stroke: Outcome assessment and sample size requirements. Stroke, 23, pp. 1281-3577.   ------------------------------------------------------------------------------------------------------------------------------------------------------------------  MCID:  Stroke:   Andrew Castro et al, 2001; n = 171; mean age 79 (5) years; assessed within 16 (12) days of stroke, Acute Stroke)  FMA Motor Scores from Admission to Discharge   10 point increase in FMA Upper Extremity = 1.5 change in discharge FIM   10 point increase in FMA Lower Extremity = 1.9 change in discharge FIM  MDC:   Stroke:   Justin Meckel et al, 2008, n = 14, mean age = 59.9 (14.6) years, assessed on average 14 (6.5) months post stroke, Chronic Stroke)   FMA = 5.2 points for the Upper Extremity portion of the assessment     G codes: In compliance with CMSs Claims Based Outcome Reporting, the following G-code set was chosen for this patient based on their primary functional limitation being treated: The outcome measure chosen to determine the severity of the functional limitation was the [facility] with a score of 66/66 which was correlated with the impairment scale. ?  Self Care:     - CURRENT STATUS: CH - 0% impaired, limited or restricted    - GOAL STATUS: CH - 0% impaired, limited or restricted    - D/C STATUS:  CH - 0% impaired, limited or restricted      Occupational Therapy Evaluation Charge Determination   History Examination Decision-Making   LOW Complexity : Brief history review  LOW Complexity : 1-3 performance deficits relating to physical, cognitive , or psychosocial skils that result in activity limitations and / or participation restrictions  LOW Complexity : No comorbidities that affect functional and no verbal or physical assistance needed to complete eval tasks       Based on the above components, the patient evaluation is determined to be of the following complexity level: LOW     Pain:  Pain Scale 1: Numeric (0 - 10)  Pain Intensity 1: 0              Activity Tolerance:   Good for toileting task. Denies dizziness. After treatment:   []  Patient left in no apparent distress sitting up in chair  [x]  Patient left in no apparent distress in bed- sitting EOB  [x]  Call bell left within reach  [x]  Nursing notified  [x]  Caregiver present  []  Bed alarm activated    COMMUNICATION/EDUCATION:   Findings and recommendations were discussed with: Registered Nurse and Vaishali Sharonluarielle 541 and daughters      Patient and/or family was verbally educated on the BE FAST acronym for signs/symptoms of CVA and TIA. BE FAST was written on patient's communication board  for visual education and reinforcement. All questions answered with patient indicating good understanding. [x]      Home safety education was provided and the patient/caregiver indicated understanding. [x]      Patient/family have participated as able and agree with findings and recommendations. []      Patient is unable to participate in plan of care at this time.     Thank you for this referral.  Naga Conway OT  Time Calculation: 11 mins

## 2018-04-06 NOTE — CONSULTS
[facility]                     Blandon, Jose Juan [facility]       4/6/2018 11:13 AM    [address] [facility]     Date of  Admission: 4/5/2018  1:56 PM     Admission type:Emergency    Consult for:Possible atrial fibrillation  Consult by:Cristino PAT [facility]     Subjective:     Cassia Mukherjee is a 80 y.o. female non-smoker with PMHX of HTN,DM,HIV,possible seizure disorder, prior admission 2016 for possible TIA/absence seizure,Carotid artery disease, atherosclerosis ascending aorta admitted for possible recurrent TIA (transient ischemic attack) manifested as transient confusion, lightheadedness,disorientation,dysequilibrium, near falls, slurred speech similar to 2016 admission [facility]. Had echo at that time unremarkable except for fibrocalcific diease ascending aorta,LAD. Apparently has had intermittent episodes over the last few yrs. EKG on admission revealed what appeared to be wandering atrial pacemaker,APC's. No evidence of atrial fib as reviewed on monitor, CXR no acute disease. MRI/CTA with no evidence of thromboemboli. Previous treatment/evaluation includes Percutaneous Coronary Intervention, echocardiogram, cardiac catheterization, coronary angioplasty and CORONARY STENT .      Cardiac risk factors: dyslipidemia, diabetes mellitus, obesity, sedentary life style, hypertension, stress, post-menopausal.      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 04/05/2018    MERLIN (acute kidney injury) ([address] 04/05/2018    TIA (transient ischemic attack) 07/15/2015 PROVIDER UNKNOWN  Past Medical History:   Diagnosis Date    Diabetes ([facility] [address]     Hypertension     Infectious disease     HIV in 80 from blood transfusion      Social History     Social History    Marital status:      Spouse name: N/A    Number of children: N/A    Years of education: N/A     Social History Main Topics    Smoking status: Never Smoker    Smokeless tobacco: Never Used    Alcohol use No    Drug use: No    Sexual activity: No     Other Topics Concern    None     Social History Narrative    Conversation 12/26/16:    She is inclined to be DNR but she would like be full code until she discusses it with her family. Her POA is Katrina Avina [phone]. (Daughter )        She lives alone, her son visits to assist, but she does all ADLs    She deserves six surviving children no history of alcohol cigarette use.      Allergies   Allergen Reactions    Coumadin [Warfarin] Hives      Family History   Problem Relation Age of Onset    Diabetes Other     Hypertension Other       Current Facility-Administered Medications   Medication Dose Route Frequency    sodium chloride (NS) flush 5-10 mL  5-10 mL IntraVENous Q8H    sodium chloride (NS) flush 5-10 mL  5-10 mL IntraVENous PRN    acetaminophen (TYLENOL) tablet 650 mg  650 mg Oral Q4H PRN    Or    acetaminophen (TYLENOL) solution 650 mg  650 mg Per NG tube Q4H PRN    Or    acetaminophen (TYLENOL) suppository 650 mg  650 mg Rectal Q4H PRN    enoxaparin (LOVENOX) injection 40 mg  40 mg SubCUTAneous Q24H    aspirin delayed-release tablet 81 mg  81 mg Oral DAILY    atorvastatin (LIPITOR) tablet 40 mg  40 mg Oral DAILY    cholecalciferol (VITAMIN D3) tablet 1,000 Units  1,000 Units Oral DAILY    isosorbide mononitrate ER (IMDUR) tablet 60 mg  60 mg Oral 7am    0.9% sodium chloride infusion  75 mL/hr IntraVENous CONTINUOUS    insulin lispro (HUMALOG) injection   SubCUTAneous AC&HS    glucose chewable tablet 16 g  4 Tab Oral PRN  dextrose (D50W) injection syrg 12.5-25 g  12.5-25 g IntraVENous PRN    glucagon (GLUCAGEN) injection 1 mg  1 mg IntraMUSCular PRN    metoprolol succinate (TOPROL-XL) XL tablet 100 mg  100 mg Oral DAILY    elvitegravir-cobicistat-emtricitabine-tenofovir alafenamide (GENVOYA) 062-480-048-10 mg tablet 1 Tab  1 Tab Oral DAILY WITH BREAKFAST    zinc oxide 20 % ointment   Topical PRN    meperidine (DEMEROL) injection 12.5 mg  12.5 mg IntraVENous Q4H PRN        Review of Symptoms:   Constitutional: negative  Eyes: negative   Ears, nose, mouth, throat, and face: negative  Respiratory: negative   Cardiovascular: negative   Gastrointestinal: negative  Genitourinary:negative   Musculoskeletal:negative   Neurological: negative   Endocrine: negative          Subjective:      Visit Vitals    /67 (BP 1 Location: Left arm, BP Patient Position: At rest;Sitting)    Pulse 68    Temp 97.8 °F (36.6 °C)    Resp 18    Ht 5' 2\" (1.575 m)    Wt 241 lb 4.8 oz (109.5 kg)    SpO2 99%    BMI 44.13 kg/m2       Physical:   General: WD,WN,obese in NAD  Heart: RRR, no m/S3/JVD, no carotid bruits   Lungs: clear   Abdomen: Soft, +BS, NTND   Extremities: LE dolores +DP/PT, no edema   Neurologic: Grossly normal    Data Review:   Recent Labs      04/06/18   0108  04/05/18   1453   WBC  6.5  6.2   HGB  10.5*  10.8*   HCT  33.4*  33.6*   PLT  214  236     Recent Labs      04/06/18   0108  04/05/18   1453   NA  142  142   K  3.5  4.0   CL  106  105   CO2  27  27   GLU  119*  132*   BUN  21*  23*   CREA  1.45*  1.81*   CA  8.5  8.9   ALB   --   3.5   TBILI   --   0.4   SGOT   --   22   ALT   --   22       Recent Labs      04/05/18   1453   TROIQ  <0.04   CPK  164   CKMB  1.2         Intake/Output Summary (Last 24 hours) at 04/06/18 1113  Last data filed at 04/05/18 1835   Gross per 24 hour   Intake                0 ml   Output                0 ml   Net                0 ml        Cardiographics    Telemetry:  Wandering atrial pacemaker rhythm  ECG: as above  Echocardiogram: to be done  CXRAY:as above       Assessment:     Assessment:       Principal Problem:    TIA (transient ischemic attack) (7/15/2015)    Active Problems:    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 However potential for rhythm deterioration from [address] into PAF exists. Would continue ASA. 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 Will review other strips. Over the yrs has had similar TIA symptoms but no evidence of PAFib as yet. Would be excellent candidate for ILR as surveillance. Will refer to EP as outpatient to discuss if needed. MERLIN (acute kidney injury) ([address] (4/5/2018)--hydrated. Has underlying CKD. HIV (human immunodeficiency virus infection) ([address] (4/6/2018)--per Dr Blanche Fabry      S/P angioplasty with stent (4/6/2018)--stable. Essential hypertension (4/6/2018)--at goal      Type 2 diabetes mellitus with hyperglycemia, without long-term current use of insulin ([address] (4/6/2018)--per Dr Blanche Fabry      History of seizure disorder (4/6/2018)--? Contribution. Neurology to see. Thank you for consult.       Signed: Katarina Castro MD

## 2018-04-06 NOTE — PROGRESS NOTES
physical Therapy neuro EVALUATION/discharge     Patient: Cristiane Villalpando (19 y.o. female)  Date: 4/6/2018  Primary Diagnosis: TIA (transient ischemic attack)  TIA (transient ischemic attack)  New onset a-fib ([facility] [address]  MERLIN (acute kidney injury) ([facility] [address]        Precautions:        ASSESSMENT :  Based on the objective data described below, the patient presents at baseline for mobility. MRI negative for acute infarct. Patient reports resolution of symptoms. Patient reports that she has been walking to the bathroom independently. Patient independent with transfers. Patient ambulated 300 feet with rollator walker and supervision. No loss of balance noted. Patient required verbal cueing for pacing and hand placement on handles of rollator rather than leaning forward on them. Patient scored a 51/56 which does not denote increased risk for falls. Patient educated on BE FAST with good understanding. Patient appears to have very supportive family members. Skilled acute physical therapy is not indicated at this time. PLAN :  Discharge Recommendations: None  Further Equipment Recommendations for Discharge: Patient has rollator walker, cane, commode, shower chair, and grab bars at home. SUBJECTIVE:   Patient stated I feel good.     OBJECTIVE DATA SUMMARY:   HISTORY:    Past Medical History:   Diagnosis Date    Diabetes ([facility] [address]     Hypertension     Infectious disease     HIV in 80 from blood transfusion     Past Surgical History:   Procedure Laterality Date    CARDIAC SURG PROCEDURE UNLIST  1995    Stent Placement    HX CHOLECYSTECTOMY      HX HEENT      cataract    HX HYSTERECTOMY      HX ORTHOPAEDIC       Prior Level of Function/Home Situation: Patient reports independent prior level of function. Patient ambulates with rollator walker. Patient has grab bars, commode, shower chair, and straight cane at home.   Personal factors and/or comorbidities impacting plan of care: supportive family    [address] [place] [address] Environment: Private residence  # Steps to Enter:  (elevator)  One/Two Story Residence: Two story  Living Alone: Yes  Support Systems: Family member(s), Judaism / louann community  Patient Expects to be Discharged to[de-identified] Private residence  Current DME Used/Available at Home: Chance Cristopher, rollator, Wheelchair, power, Glucometer, Blood pressure cuff, Shower chair, Grab bars    EXAMINATION/PRESENTATION/DECISION MAKING:   Critical Behavior:  Neurologic State: Alert  Orientation Level: Oriented to person, Oriented to place, Oriented to situation, Oriented to time  Cognition: Memory loss, Decreased attention/concentration  Safety/Judgement: Decreased insight into deficits  Hearing: Auditory  Auditory Impairment: None    Range Of Motion:  AROM: Generally decreased, functional  PROM: Generally decreased, functional     Strength:    Strength: Generally decreased, functional     Tone & Sensation:   Tone: Normal  Sensation: Intact     Functional Mobility:  Bed Mobility:  Scooting: Independent   Patient sitting at side of bed with family upon entering room. Transfers:  Sit to Stand: Independent  Stand to Sit: Independent     Balance:   Sitting: Intact  Standing: Intact; Without support     Ambulation/Gait Training:  Distance (ft): 300 Feet (ft)  Assistive Device: Gait belt;Walker, rollator  Ambulation - Level of Assistance: Supervision  Gait Description (WDL): Exceptions to WDL  Gait Abnormalities: Decreased step clearance       Functional Measure:  Poon Balance Test:    Sitting to Standin  Standing Unsupported: 4  Sitting with Back Unsupported: 4  Standing to Sittin  Transfers: 4  Standing Unsupported with Eyes Closed: 3  Standing Unsupported with Feet Together: 4  Reach Forward with Outstretched Arm: 3   Object: 3  Turn to Look Over Shoulders: 4  Turn 360 Degrees: 4  Alternate Foot on Step/Stool: 3  Standing Unsupported One Foot in Front: 3  Stand on One Le  Total: 51         56=Maximum possible score; 0-20=High fall risk  21-40=Moderate fall risk   41-56=Low fall risk     Poon Balance Test and G-code impairment scale:  Percentage of Impairment CH    0%   CI    1-19% CJ    20-39% CK    40-59% CL    60-79% CM    80-99% CN     100%   Poon   Score 0-56 56 45-55 34-44 23-33 12-22 1-11 0     G codes: In compliance with CMSs Claims Based Outcome Reporting, the following G-code set was chosen for this patient based on their primary functional limitation being treated: The outcome measure chosen to determine the severity of the functional limitation was the denise with a score of 51/56 which was correlated with the impairment scale. ? Mobility - Walking and Moving Around:     - CURRENT STATUS: CI - 1%-19% impaired, limited or restricted    - GOAL STATUS: CI - 1%-19% impaired, limited or restricted    - D/C STATUS: CI - 1%-19% impaired, limited or restricted     Based on the above components, the patient evaluation is determined to be of the following complexity level: LOW     Pain:  Pain Scale 1: Numeric (0 - 10)  Pain Intensity 1: 0     Activity Tolerance:   Good  Please refer to the flowsheet for vital signs taken during this treatment. After treatment:   []         Patient left in no apparent distress sitting up in chair  [x]         Patient left in no apparent distress in bed  [x]         Call bell left within reach  [x]         Nursing notified  []         Caregiver present  []         Bed alarm activated    COMMUNICATION/EDUCATION:   Patient was educated regarding Her deficit(s) of decreased balance as this relates to Her diagnosis of TIA. She demonstrated Good understanding as evidenced by verbalization. Patient and/or family was verbally educated on the BE FAST acronym for signs/symptoms of CVA and TIA. BE FAST was written on patient's communication board  for visual education and reinforcement. All questions answered with patient indicating good understanding.      [x]   Fall prevention education was provided and the patient/caregiver indicated understanding. [x]   Patient/family have participated as able and agree with findings and recommendations. []   Patient is unable to participate in plan of care at this time.     Findings and recommendations were discussed with: Physical Therapist and Registered Nurse    Thank you for this referral.  Masha Trotter, PT   Time Calculation: 18 mins

## 2018-04-06 NOTE — CDMP QUERY
The documentation in this patients record indicates that the patient has \"HIV infection. \"  Unless HIV is documented as asymptomatic in provider documentation, ICD 10 indexing considers this to be synonymous with and reported as HIV with progression to AIDS. Please clarify this patient's HIV status as:    => Asymptomatic HIV Infection/Disease (e.g. no indication of progression to AIDS)  => HIV Disease with progression to AIDS (e.g. current or historical diagnosis of a HIV related illness/opportunistic infection)  => HIV positive by serology only  => Unable to clinically determine    In this instance, the patient record does not indicate that the patient has any current or historical diagnosis of any HIV related illnesses or opportunistic infections that are generally indicators of the progression  from an asymptomatic HIV infection or HIV positive status, to a symptomatic HIV infection/AIDS diagnosis.         REFERENCE:    HIV w/ a Major-Related Condition:   Sepsis, TB, Herpes, Candidiasis, Pneumocystis (PCP) , Kaposi's Sarcoma, Lymphoma, Pre-senile dementia/OBS, Encephalopathy, Endocarditis, Pneumonia     HIV w/ a Minor-Related Condition:  Malnutrition, Hypovolemia, Anemia, Specified types of Pneumonia, GE d/t chemotherapy or radiation, Failure to Thrive, Fever, Fatigue, Enlarged lymph nodes, Dyspnea      Thank you,  Shivani Ro, MSN, [place] [id number]

## 2018-04-06 NOTE — ACP (ADVANCE CARE PLANNING)
Responded to call informing of pt's interest in AMD form. Upon arrival met pt along with two daughters. Daughter Michael Dior indicated that she has a copy of pt's POA and believes the AMD information is within that document. Pt's other daughter Brianda Lugo recently retired from [employer] working with [employer] and is very familiar with AMD forms. Michael Dior stated that she would like at [facility] and if AMD is needed, daughters will assist pt in filling out. Provided AMD form and informational booklet. Will follow up as needed. RAINER Cary

## 2018-04-06 NOTE — PROGRESS NOTES
1830) Pt arrive to unit. Christine Medina) Consult Dr. Jacek Bush pt diet. 1839) Pt pass swallow test  1917) Pt NIH scale 1, orientation to pt age.

## 2018-04-06 NOTE — PROGRESS NOTES
Hospitalist Progress Note    NAME: Beatriz Jacobo   :  1935   MRN:  [id number]   Room Number:  796/93  @ [facility]       Interim Hospital Summary: 80 y.o. female whom presented on 2018 with      Assessment / Plan:    TIA v/s Absence seizures:POA  History of TIA in   -tele admission  -ASA/statin  -Fasting lipid panel-47, Hba1c 8  -neurology consult awaited  -echo-await results  MRI head and MRA head and neck-  1. Chronic white matter T2 hyperintensity likely related to chronic small vessel  ischemic change. 2. No acute intracranial abnormality or interval change since 16. IMPRESSION:  1. Less than 20% stenosis origin right internal carotid artery. 2. No stenosis left carotid bifurcation. PT/OT eval     Acute kidney injury-resolved   CKD 3  Creatinine 1.4 at baseline    ? Possible wandering atrial pacemaker with Atrial Premature contractions on EKG  apprecaite cardiology consult  Continue ASA/BB per Dr. Katalina Guillen cardio f/up. She might benefit form loop recorder(outpaitent)     Hypertension, essential  resumeToprol-XL,losartan     Diabetes mellitus  Hold metformin, insulin sliding scale  A1c 6.7 last year,A1c 8  Diabetic diet     Asymptomatic HIV  Follows up with [facility] ID clinic  Currently on Genvoya     Morbid obese  Body mass index is 44.13 kg/(m^2). Code Status: full  Surrogate Decision Maker:daughter  Prophylaxis: Lovenox  Recommended Disposition: Home w/Family     Subjective:     Chief Complaint / Reason for Physician Visit  \"when can I go home? I feel fine\". Discussed with RN events overnight.      Review of Systems:  Symptom Y/N Comments  Symptom Y/N Comments   Fever/Chills n   Chest Pain n    Poor Appetite n   Edema n    Cough n   Abdominal Pain n    Sputum n   Joint Pain n    SOB/EDWARDS n   Pruritis/Rash n    Nausea/vomit n   Tolerating PT/OT y    Diarrhea n   Tolerating Diet y    Constipation n   Other       Could NOT obtain due to:      Objective: VITALS:   Last 24hrs VS reviewed since prior progress note. Most recent are:  Patient Vitals for the past 24 hrs:   Temp Pulse Resp BP SpO2   04/06/18 1129 97.2 °F (36.2 °C) 71 20 152/87 100 %   04/06/18 0747 97.8 °F (36.6 °C) 68 18 130/67 99 %   04/06/18 0023 97.5 °F (36.4 °C) 68 18 137/79 94 %   04/05/18 1835 98 °F (36.7 °C) 82 16 (!) 152/99 97 %   04/05/18 1557 - 79 17 152/72 98 %       Intake/Output Summary (Last 24 hours) at 04/06/18 1536  Last data filed at 04/05/18 1835   Gross per 24 hour   Intake                0 ml   Output                0 ml   Net                0 ml        PHYSICAL EXAM:  General: WD, WN. Alert, cooperative, no acute distress    EENT:  EOMI. Anicteric sclerae. MMM  Resp:  CTA bilaterally, no wheezing or rales. No accessory muscle use  CV:  Regular  rhythm,  No edema  GI:  Soft, Non distended, Non tender.  +Bowel sounds  Neurologic:  Alert and oriented X 3, normal speech,   Psych:   Good insight. Not anxious nor agitated  Skin:  No rashes. No jaundice    Reviewed most current lab test results and cultures  YES  Reviewed most current radiology test results   YES  Review and summation of old records today    NO  Reviewed patient's current orders and MAR    YES  PMH/ reviewed - no change compared to H&P  ________________________________________________________________________  Care Plan discussed with:    Comments   Patient x    Family  x    RN x    Care Manager x    Consultant  x                      Multidiciplinary team rounds were held today with , nursing, pharmacist and clinical coordinator. Patient's plan of care was discussed; medications were reviewed and discharge planning was addressed.      ________________________________________________________________________  Total NON critical care TIME:  40   Minutes    Total CRITICAL CARE TIME Spent:   Minutes non procedure based      Comments   >50% of visit spent in counseling and coordination of care x ________________________________________________________________________  Leo Duran MD     Procedures: see electronic medical records for all procedures/Xrays and details which were not copied into this note but were reviewed prior to creation of Plan. LABS:  I reviewed today's most current labs and imaging studies.   Pertinent labs include:  Recent Labs      04/06/18 0108 04/05/18   1453   WBC  6.5  6.2   HGB  10.5*  10.8*   HCT  33.4*  33.6*   PLT  214  236     Recent Labs      04/06/18 0108 04/05/18   1453   NA  142  142   K  3.5  4.0   CL  106  105   CO2  27  27   GLU  119*  132*   BUN  21*  23*   CREA  1.45*  1.81*   CA  8.5  8.9   ALB   --   3.5   TBILI   --   0.4   SGOT   --   22   ALT   --   22       Signed: Leo Duran MD

## 2018-04-06 NOTE — PROGRESS NOTES
Echocardiogram 2D adult completed as ordered. Procedure explained. Full report to follow. Definity given intravenously for image enhancement.

## 2018-04-06 NOTE — PROGRESS NOTES
0700) Bedside shift change report given to Zachary Johnson, RN (oncoming nurse) by Victorino Portillo RN (offgoing nurse). Report included the following information SBAR, Kardex, MAR, Accordion and Recent Results. 0800) Consult Dr. Malcolm Meehan for ativan prior to MRI  [phone]) Pt return to unit. [phone]) Consult for pastoral care for advanced directive. 1) Consult Dr. Malcolm Meehan, update for family. Able to let family know no acute changes since 2016 MRI, waiting for neurologist.    Bronson Gomez) . Bella Alcantara Reviewed discharge instructions with pt and daughters including follow-up appointments,  medications to continue, stroke education, and MyChart information. Pt and daughters expressed understanding. IV was removed. Discussed holding metformin 48 hours after CT to protect kidney function, able to restart Sunday. 1900) Wheel pt downstairs for discharge with daughters.

## 2018-04-07 NOTE — PROGRESS NOTES
NEUROLOGY CONSULTATION    DATE OF CONSULTATION: 4/6/2018  CONSULTED BY:Dr RYLAND Caruso    REASON FOR CONSULT:AMS      HISTORY OF PRESENT ILLNESS  Rafael Guerrero is a 80 y.o. right handed black female with history of HTN, HIV seropositivity, DM, was presented to the ER with AMS. Patient's two daughters were in the room at the time of interview, according to her daughter , patient was sitting down at home on he day of presentation while talking to her son over the phone, suddenly  Patient was talking gibberish, could not be understood. Daughter said she was called and when she tried talking to mom, she was not making sense, getting to the house , patient was confused, this lasted about 10 minutes. Patient could not recall any of these. This episode was said to have happened couple of times far between, by the time patient got to the ER, she was lucid. Patient was subsequently admitted for TIA. Of note , patient's blood glucose was normal.  Review of Systems - General ROS: positive for  - fatigue and sleep disturbance  Psychological ROS: positive for - anxiety and sleep disturbances  Ophthalmic ROS: negative for - blurry vision, decreased vision, double vision or photophobia  ENT ROS: negative for - headaches, tinnitus or visual changes  Allergy and Immunology ROS: negative  Hematological and Lymphatic ROS: negative  Endocrine ROS: negative  Respiratory ROS: no cough, shortness of breath, or wheezing  Cardiovascular ROS: no chest pain or dyspnea on exertion  Gastrointestinal ROS: no abdominal pain, change in bowel habits, or black or bloody stools  Genito-Urinary ROS: no dysuria, trouble voiding, or hematuria  Musculoskeletal ROS: positive for - gait disturbance, joint pain, joint stiffness, muscle pain and muscular weakness  Neurological ROS: positive for - confusion, gait disturbance, impaired coordination/balance, numbness/tingling and weakness  Dermatological ROS: negative    PMH  Past Medical History:   Diagnosis Date    Diabetes ([address]     Hypertension     Infectious disease     HIV in 89 from blood transfusion         Social History     Social History    Marital status:      Spouse name: N/A    Number of children: N/A    Years of education: N/A     Social History Main Topics    Smoking status: Never Smoker    Smokeless tobacco: Never Used    Alcohol use No    Drug use: No    Sexual activity: No     Other Topics Concern    None     Social History Narrative    Conversation 12/26/16:    She is inclined to be DNR but she would like be full code until she discusses it with her family. Her POA is Joe Britton [phone]. (Daughter )        She lives alone, her son visits to assist, but she does all ADLs    She deserves six surviving children no history of alcohol cigarette use. FH  Family History   Problem Relation Age of Onset    Diabetes Other     Hypertension Other        ALLERGIES  Allergies   Allergen Reactions    Coumadin [Warfarin] Hives       CURRENT MEDS  Current Outpatient Prescriptions   Medication Sig Dispense Refill    losartan (COZAAR) 100 mg tablet Take 100 mg by mouth daily.  cholecalciferol (VITAMIN D3) 1,000 unit tablet Take 1,000 Units by mouth daily.  liver oil-zinc oxide ointment Apply  to affected area as needed for Skin Irritation.  aspirin delayed-release 81 mg tablet Take 81 mg by mouth daily.  atorvastatin (LIPITOR) 40 mg tablet Take 40 mg by mouth daily.  hydroCHLOROthiazide (HYDRODIURIL) 25 mg tablet Take 25 mg by mouth daily.  metFORMIN (GLUMETZA ER) 500 mg TG24 24 hour tablet Take 500 mg by mouth two (2) times a day.  metoprolol succinate (TOPROL-XL) 100 mg tablet Take 100 mg by mouth daily.  elvitegravir-cobicistat-emtricitabine-tenofovir alafenamide (GENVOYA) tab tablet Take 1 Tab by mouth daily (with breakfast).  isosorbide mononitrate ER (IMDUR) 60 mg CR tablet Take 60 mg by mouth every morning.  NEW RX on 3/2/18 not yet started. ROS  As per HPI  LABS  Results for orders placed or performed during the hospital encounter of 04/05/18   TROPONIN I   Result Value Ref Range    Troponin-I, Qt. <0.04 <0.05 ng/mL   CBC WITH AUTOMATED DIFF   Result Value Ref Range    WBC 6.2 3.6 - 11.0 K/uL    RBC 3.68 (L) 3.80 - 5.20 M/uL    HGB 10.8 (L) 11.5 - 16.0 g/dL    HCT 33.6 (L) 35.0 - 47.0 %    MCV 91.3 80.0 - 99.0 FL    MCH 29.3 26.0 - 34.0 PG    MCHC 32.1 30.0 - 36.5 g/dL    RDW 13.2 11.5 - 14.5 %    PLATELET 598 386 - 327 K/uL    MPV 10.9 8.9 - 12.9 FL    NRBC 0.0 0  WBC    ABSOLUTE NRBC 0.00 0.00 - 0.01 K/uL    NEUTROPHILS 64 32 - 75 %    LYMPHOCYTES 25 12 - 49 %    MONOCYTES 8 5 - 13 %    EOSINOPHILS 2 0 - 7 %    BASOPHILS 1 0 - 1 %    IMMATURE GRANULOCYTES 0 0.0 - 0.5 %    ABS. NEUTROPHILS 4.0 1.8 - 8.0 K/UL    ABS. LYMPHOCYTES 1.6 0.8 - 3.5 K/UL    ABS. MONOCYTES 0.5 0.0 - 1.0 K/UL    ABS. EOSINOPHILS 0.2 0.0 - 0.4 K/UL    ABS. BASOPHILS 0.0 0.0 - 0.1 K/UL    ABS. IMM. GRANS. 0.0 0.00 - 0.04 K/UL    DF AUTOMATED     METABOLIC PANEL, COMPREHENSIVE   Result Value Ref Range    Sodium 142 136 - 145 mmol/L    Potassium 4.0 3.5 - 5.1 mmol/L    Chloride 105 97 - 108 mmol/L    CO2 27 21 - 32 mmol/L    Anion gap 10 5 - 15 mmol/L    Glucose 132 (H) 65 - 100 mg/dL    BUN 23 (H) 6 - 20 MG/DL    Creatinine 1.81 (H) 0.55 - 1.02 MG/DL    BUN/Creatinine ratio 13 12 - 20      GFR est AA 32 (L) >60 ml/min/1.73m2    GFR est non-AA 27 (L) >60 ml/min/1.73m2    Calcium 8.9 8.5 - 10.1 MG/DL    Bilirubin, total 0.4 0.2 - 1.0 MG/DL    ALT (SGPT) 22 12 - 78 U/L    AST (SGOT) 22 15 - 37 U/L    Alk.  phosphatase 79 45 - 117 U/L    Protein, total 7.8 6.4 - 8.2 g/dL    Albumin 3.5 3.5 - 5.0 g/dL    Globulin 4.3 (H) 2.0 - 4.0 g/dL    A-G Ratio 0.8 (L) 1.1 - 2.2     CK W/ CKMB & INDEX   Result Value Ref Range     26 - 192 U/L    CK - MB 1.2 <3.6 NG/ML    CK-MB Index 0.7 0.0 - 2.5     URINALYSIS W/ REFLEX CULTURE   Result Value Ref Range    Color YELLOW/STRAW      Appearance CLEAR CLEAR      Specific gravity 1.010 1.003 - 1.030      pH (UA) 5.5 5.0 - 8.0      Protein NEGATIVE  NEG mg/dL    Glucose NEGATIVE  NEG mg/dL    Ketone NEGATIVE  NEG mg/dL    Bilirubin NEGATIVE  NEG      Blood NEGATIVE  NEG      Urobilinogen 0.2 0.2 - 1.0 EU/dL    Nitrites NEGATIVE  NEG      Leukocyte Esterase TRACE (A) NEG      WBC 0-4 0 - 4 /hpf    RBC 0-5 0 - 5 /hpf    Epithelial cells FEW FEW /lpf    Bacteria NEGATIVE  NEG /hpf    UA:UC IF INDICATED CULTURE NOT INDICATED BY UA RESULT CNI      Mucus 1+ (A) NEG /lpf   CBC W/O DIFF   Result Value Ref Range    WBC 6.5 3.6 - 11.0 K/uL    RBC 3.61 (L) 3.80 - 5.20 M/uL    HGB 10.5 (L) 11.5 - 16.0 g/dL    HCT 33.4 (L) 35.0 - 47.0 %    MCV 92.5 80.0 - 99.0 FL    MCH 29.1 26.0 - 34.0 PG    MCHC 31.4 30.0 - 36.5 g/dL    RDW 12.8 11.5 - 14.5 %    PLATELET 349 715 - 829 K/uL    MPV 10.1 8.9 - 12.9 FL    NRBC 0.0 0  WBC    ABSOLUTE NRBC 0.00 0.00 - 0.01 K/uL   LIPID PANEL   Result Value Ref Range    LIPID PROFILE          Cholesterol, total 113 <200 MG/DL    Triglyceride 81 <150 MG/DL    HDL Cholesterol 50 MG/DL    LDL, calculated 46.8 0 - 100 MG/DL    VLDL, calculated 16.2 MG/DL    CHOL/HDL Ratio 2.3 0 - 5.0     HEMOGLOBIN A1C WITH EAG   Result Value Ref Range    Hemoglobin A1c 8.0 (H) 4.2 - 6.3 %    Est. average glucose 449 mg/dL   METABOLIC PANEL, BASIC   Result Value Ref Range    Sodium 142 136 - 145 mmol/L    Potassium 3.5 3.5 - 5.1 mmol/L    Chloride 106 97 - 108 mmol/L    CO2 27 21 - 32 mmol/L    Anion gap 9 5 - 15 mmol/L    Glucose 119 (H) 65 - 100 mg/dL    BUN 21 (H) 6 - 20 MG/DL    Creatinine 1.45 (H) 0.55 - 1.02 MG/DL    BUN/Creatinine ratio 14 12 - 20      GFR est AA 42 (L) >60 ml/min/1.73m2    GFR est non-AA 34 (L) >60 ml/min/1.73m2    Calcium 8.5 8.5 - 10.1 MG/DL   GLUCOSE, POC   Result Value Ref Range    Glucose (POC) 119 (H) 65 - 100 mg/dL    Performed by Caprice Terry    GLUCOSE, POC   Result Value Ref Range    Glucose (POC) 147 (H) 65 - 100 mg/dL    Performed by Dat [place]    GLUCOSE, POC   Result Value Ref Range    Glucose (POC) 163 (H) 65 - 100 mg/dL    Performed by Alfredo Stoney    GLUCOSE, POC   Result Value Ref Range    Glucose (POC) 112 (H) 65 - 100 mg/dL    Performed by Alfredo Stoney    GLUCOSE, POC   Result Value Ref Range    Glucose (POC) 105 (H) 65 - 100 mg/dL    Performed by [employer] Stoney    EKG, 12 LEAD, INITIAL   Result Value Ref Range    Ventricular Rate 79 BPM    Atrial Rate 82 BPM    QRS Duration 100 ms    Q-T Interval 394 ms    QTC Calculation (Bezet) 451 ms    Calculated R Axis -33 degrees    Calculated T Axis 82 degrees    Diagnosis       Possible wandering atrial pacemaker with APC's. Left axis deviation  Incomplete right bundle branch block  Moderate voltage criteria for LVH, may be normal variant  Nonspecific T wave abnormality , probably digitalis effect  When compared with ECG of 26-DEC-2016 10:42,  Possible wandering atrial pacemaker has repalced sinus rhythm    Confirmed by Allyson Soria ([id number]) on 4/6/2018 12:01:03 AM         PHYSICAL EXAM  Visit Vitals    /58 (BP 1 Location: Left arm, BP Patient Position: Sitting)    Pulse 67    Temp 97.1 °F (36.2 °C)    Resp 18    Ht 5' 2\" (1.575 m)    Wt 241 lb 4.8 oz (109.5 kg)    SpO2 98%    BMI 44.13 kg/m2     General:  Alert, cooperative, no distress. Head:  Normocephalic, without obvious abnormality, atraumatic. Eyes:  Conjunctivae/corneas clear. Pupils equal, round, reactive to light. Extraocular movements intact, VFF, NO papilledema   Lungs:  Heart:   Non labored breathing  Regular rate and rhythm, no carotid bruits   Abdomen:   Soft, non-distended   Extremities: Extremities normal, atraumatic, no cyanosis or edema. Pulses: 2+ and symmetric all extremities. Skin: Skin color, texture, turgor normal. No rashes or lesions.    Neurologic:  Gen: Attention normal             Language: naming, repetition, fluency normal Memory: intact recent and remote memory  Cranial Nerves:  I: smell Not tested   II: visual fields Full to confrontation   II: pupils Equal, round, reactive to light   II: optic disc No papilledema   III,VII: ptosis none   III,IV,VI: extraocular muscles  Full ROM   V: mastication normal   V: facial light touch sensation  normal   VII: facial muscle function   symmetric   VIII: hearing symmetric   IX: soft palate elevation  normal   XI: trapezius strength  5/5   XI: sternocleidomastoid strength 5/5   XI: neck flexion strength  5/5   XII: tongue  midline     Motor: normal bulk and tone, no tremor              Strength: 5/5 all four extremities  Sensory: Equivocal sensation to LT, PP, vibration, and temperature  Coordination: FTN and HTS intact, Rhomberg positive  Gait: normal gait  Unable to perform tandem   Reflexes: 2+ throughout       IMPRESSION:  AMS  TIA  Paresthesia  Rule seizure disorder    RECOMMENDATIONS:  1. MRI brain/ MRA head and carotids  2. Echcardiogram  3. Telemetry  4. Permissive HTN (SBP<180/<100)  5. Stroke labs (HgbA1c, TSH, lipid panel)  6. Start ASA 81mg daily  7. Start statin  8. Discussed personal risk factors for stroke includin. Discussed signs and symptoms of stroke and when to alert 911  10. VTE prophylaxis:   Thank you very much for this consultation. No further neurologic recommendations at this time. Will sign off but please call with questions.   Follow up with in the [address] [place] [address]

## 2018-04-25 ENCOUNTER — OFFICE VISIT (OUTPATIENT)
Dept: NEUROLOGY | Age: 83
End: 2018-04-25

## 2018-04-25 VITALS
HEIGHT: 59 IN | OXYGEN SATURATION: 93 % | HEART RATE: 71 BPM | TEMPERATURE: 97.7 F | WEIGHT: 234 LBS | SYSTOLIC BLOOD PRESSURE: 166 MMHG | DIASTOLIC BLOOD PRESSURE: 93 MMHG | RESPIRATION RATE: 20 BRPM | BODY MASS INDEX: 47.17 KG/M2

## 2018-04-25 DIAGNOSIS — G45.9 TRANSIENT CEREBRAL ISCHEMIA, UNSPECIFIED TYPE: Primary | ICD-10-CM

## 2018-04-25 DIAGNOSIS — R20.2 PARESTHESIA: ICD-10-CM

## 2018-04-25 DIAGNOSIS — R26.9 GAIT DISORDER: ICD-10-CM

## 2018-04-25 DIAGNOSIS — G40.209 PARTIAL SYMPTOMATIC EPILEPSY WITH COMPLEX PARTIAL SEIZURES, NOT INTRACTABLE, WITHOUT STATUS EPILEPTICUS (HCC): ICD-10-CM

## 2018-04-25 PROBLEM — E11.21 TYPE 2 DIABETES WITH NEPHROPATHY (HCC): Status: ACTIVE | Noted: 2018-04-25

## 2018-04-25 PROBLEM — E66.01 OBESITY, MORBID (HCC): Status: ACTIVE | Noted: 2018-04-25

## 2018-04-25 RX ORDER — GLUCOSAMINE SULFATE 1500 MG
POWDER IN PACKET (EA) ORAL DAILY
Refills: 1 | COMMUNITY
Start: 2018-04-09

## 2018-04-25 RX ORDER — METFORMIN HYDROCHLORIDE 500 MG/1
TABLET, EXTENDED RELEASE ORAL
Refills: 0 | COMMUNITY
Start: 2018-04-09 | End: 2018-04-25 | Stop reason: SDUPTHER

## 2018-04-25 RX ORDER — CLOPIDOGREL BISULFATE 75 MG/1
TABLET ORAL
Refills: 0 | COMMUNITY
Start: 2018-04-13 | End: 2021-07-12

## 2018-04-25 NOTE — PROGRESS NOTES
Neurology Consult Note      HISTORY PROVIDED BY: patient/Daughter    Chief Complaint:   Chief Complaint   Patient presents with    Dizziness    New Patient      Subjective:    Jordan Dahl is a 80 y.o. right handed female who presents in consultation for Altered mental status , possible seizure. Patient was accompanied to the office by her daughter. 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 She was recently discharged from the hospital for altered mental status, daughter says no episode has happened since hospital discharge, daughter says episode appears to happen whenever patient has major stress. for instance death in the family or trauma. Denies dysphagia, odynophagia.   Review of Systems - General ROS: positive for  - fatigue and sleep disturbance  Psychological ROS: positive for - concentration difficulties and sleep disturbances  Ophthalmic ROS: positive for - blurry vision and decreased vision  ENT ROS: positive for - headaches, vertigo and visual changes  Allergy and Immunology ROS: negative  Hematological and Lymphatic ROS: negative  Endocrine ROS: negative  Respiratory ROS: no cough, shortness of breath, or wheezing  Cardiovascular ROS: no chest pain or dyspnea on exertion  Gastrointestinal ROS: no abdominal pain, change in bowel habits, or black or bloody stools  Genito-Urinary ROS: no dysuria, trouble voiding, or hematuria  Musculoskeletal ROS: positive for - gait disturbance, joint pain, joint stiffness, joint swelling, muscle pain and muscular weakness  Neurological ROS: positive for - gait disturbance, impaired coordination/balance, numbness/tingling and weakness  Dermatological ROS: negative      Past Medical History:   Diagnosis Date    Arthritis     Diabetes ([facility] [address]     Hypertension     Incontinence     Infectious disease     HIV in 80 from blood transfusion      Past Surgical History:   Procedure Laterality Date    CARDIAC SURG PROCEDURE UNLIST  1995    Stent Placement    HX CHOLECYSTECTOMY      HX HEENT      cataract    HX HYSTERECTOMY      HX MYOMECTOMY      HX ORTHOPAEDIC        Social History     Social History    Marital status:      Spouse name: N/A    Number of children: N/A    Years of education: N/A     Occupational History    Not on file. Social History Main Topics    Smoking status: Never Smoker    Smokeless tobacco: Never Used    Alcohol use No    Drug use: No    Sexual activity: No     Other Topics Concern    Not on file     Social History Narrative    Conversation 12/26/16:    She is inclined to be DNR but she would like be full code until she discusses it with her family. Her POA is [place] [phone]. (Daughter )        She lives alone, her son visits to assist, but she does all ADLs    She deserves six surviving children no history of alcohol cigarette use. Family History   Problem Relation Age of Onset    Diabetes Other     Hypertension Other     Stroke Daughter     Other Daughter      sarcoidosis    Other Grandchild      sarcoidosis         Objective:   ROS  As per HPI  Allergies   Allergen Reactions    Coumadin [Warfarin] Hives        Meds:  Outpatient Medications Prior to Visit   Medication Sig Dispense Refill    losartan (COZAAR) 100 mg tablet Take 100 mg by mouth daily.  isosorbide mononitrate ER (IMDUR) 60 mg CR tablet Take 60 mg by mouth every morning. NEW RX on 3/2/18  not yet started.  liver oil-zinc oxide ointment Apply  to affected area as needed for Skin Irritation.  aspirin delayed-release 81 mg tablet Take 81 mg by mouth daily.  atorvastatin (LIPITOR) 40 mg tablet Take 40 mg by mouth daily.  hydroCHLOROthiazide (HYDRODIURIL) 25 mg tablet Take 25 mg by mouth daily.       metFORMIN (GLUMETZA ER) 500 mg TG24 24 hour tablet Take 500 mg by mouth two (2) times a day.  metoprolol succinate (TOPROL-XL) 100 mg tablet Take 100 mg by mouth daily.  elvitegravir-cobicistat-emtricitabine-tenofovir alafenamide (GENVOYA) tab tablet Take 1 Tab by mouth daily (with breakfast).  cholecalciferol (VITAMIN D3) 1,000 unit tablet Take 1,000 Units by mouth daily. No facility-administered medications prior to visit. Imaging:  MRI Results (most recent):    Results from Hospital Encounter encounter on 04/05/18   MRA BRAIN WO CONT   Narrative EXAM:  MRA BRAIN WO CONT  INDICATION:  Altered mental status. Confusion. CT head 4/5/18, MRA head 12/26/16  TECHNIQUE: Axial 3-D time-of-flight MR angiography was performed from the  cranial base to the proximal intracranial vessels. MIP reconstructions were  created. COMPARISON: MRA head 12/26/16  FINDINGS:  Mild irregularity and possible mild stenosis in the carotid siphons greater on  the right. Symmetric flow in middle proximal anterior cerebral arteries. As previously  noted there may be some atherosclerosis involving the left anterior cerebral  artery A2 segment. Impression IMPRESSION:  1. Mild atherosclerosis right greater than left carotid siphon with mild  stenosis. 2. No proximal cerebral artery occlusion or significant stenosis. No interval  change. CT Results (most recent):    Results from Hospital Encounter encounter on 04/05/18   CT HEAD WO CONT   Narrative EXAM:  CT HEAD WO CONT    INDICATION:   Decreased alertness; TIA symptoms for 10-15 minutes, confused and  incoherent, now at baseline, TIA ? COMPARISON: 12/26/2016. CONTRAST:  None. TECHNIQUE: Unenhanced CT of the head was performed using 5 mm images. Brain and  bone windows were generated. CT dose reduction was achieved through use of a  standardized protocol tailored for this examination and automatic exposure  control for dose modulation.       Note: Some motion was present on the study.    FINDINGS:  The ventricles and sulci are normal in size, shape and configuration and  midline. There is no significant white matter disease. There is a stable  hypodensity in the left clement. Mild periventricular white matter hypodensity is  present. Hypodensity is also present in both internal capsules. .  The basilar  cisterns are open. No acute infarct is identified. The bone windows demonstrate  no abnormalities. The visualized portions of the paranasal sinuses and mastoid  air cells are clear. Early left vertebral artery calcification is present. Impression IMPRESSION: Stable nonspecific white matter disease. No acute intracranial  process identified. Reviewed records in [facility] [employer] tab today    Lab Review   Results for orders placed or performed during the hospital encounter of 04/05/18   TROPONIN I   Result Value Ref Range    Troponin-I, Qt. <0.04 <0.05 ng/mL   CBC WITH AUTOMATED DIFF   Result Value Ref Range    WBC 6.2 3.6 - 11.0 K/uL    RBC 3.68 (L) 3.80 - 5.20 M/uL    HGB 10.8 (L) 11.5 - 16.0 g/dL    HCT 33.6 (L) 35.0 - 47.0 %    MCV 91.3 80.0 - 99.0 FL    MCH 29.3 26.0 - 34.0 PG    MCHC 32.1 30.0 - 36.5 g/dL    RDW 13.2 11.5 - 14.5 %    PLATELET 183 054 - 689 K/uL    MPV 10.9 8.9 - 12.9 FL    NRBC 0.0 0  WBC    ABSOLUTE NRBC 0.00 0.00 - 0.01 K/uL    NEUTROPHILS 64 32 - 75 %    LYMPHOCYTES 25 12 - 49 %    MONOCYTES 8 5 - 13 %    EOSINOPHILS 2 0 - 7 %    BASOPHILS 1 0 - 1 %    IMMATURE GRANULOCYTES 0 0.0 - 0.5 %    ABS. NEUTROPHILS 4.0 1.8 - 8.0 K/UL    ABS. LYMPHOCYTES 1.6 0.8 - 3.5 K/UL    ABS. MONOCYTES 0.5 0.0 - 1.0 K/UL    ABS. EOSINOPHILS 0.2 0.0 - 0.4 K/UL    ABS. BASOPHILS 0.0 0.0 - 0.1 K/UL    ABS. IMM.  GRANS. 0.0 0.00 - 0.04 K/UL    DF AUTOMATED     METABOLIC PANEL, COMPREHENSIVE   Result Value Ref Range    Sodium 142 136 - 145 mmol/L    Potassium 4.0 3.5 - 5.1 mmol/L    Chloride 105 97 - 108 mmol/L    CO2 27 21 - 32 mmol/L    Anion gap 10 5 - 15 mmol/L Glucose 132 (H) 65 - 100 mg/dL    BUN 23 (H) 6 - 20 MG/DL    Creatinine 1.81 (H) 0.55 - 1.02 MG/DL    BUN/Creatinine ratio 13 12 - 20      GFR est AA 32 (L) >60 ml/min/1.73m2    GFR est non-AA 27 (L) >60 ml/min/1.73m2    Calcium 8.9 8.5 - 10.1 MG/DL    Bilirubin, total 0.4 0.2 - 1.0 MG/DL    ALT (SGPT) 22 12 - 78 U/L    AST (SGOT) 22 15 - 37 U/L    Alk.  phosphatase 79 45 - 117 U/L    Protein, total 7.8 6.4 - 8.2 g/dL    Albumin 3.5 3.5 - 5.0 g/dL    Globulin 4.3 (H) 2.0 - 4.0 g/dL    A-G Ratio 0.8 (L) 1.1 - 2.2     CK W/ CKMB & INDEX   Result Value Ref Range     26 - 192 U/L    CK - MB 1.2 <3.6 NG/ML    CK-MB Index 0.7 0.0 - 2.5     URINALYSIS W/ REFLEX CULTURE   Result Value Ref Range    Color YELLOW/STRAW      Appearance CLEAR CLEAR      Specific gravity 1.010 1.003 - 1.030      pH (UA) 5.5 5.0 - 8.0      Protein NEGATIVE  NEG mg/dL    Glucose NEGATIVE  NEG mg/dL    Ketone NEGATIVE  NEG mg/dL    Bilirubin NEGATIVE  NEG      Blood NEGATIVE  NEG      Urobilinogen 0.2 0.2 - 1.0 EU/dL    Nitrites NEGATIVE  NEG      Leukocyte Esterase TRACE (A) NEG      WBC 0-4 0 - 4 /hpf    RBC 0-5 0 - 5 /hpf    Epithelial cells FEW FEW /lpf    Bacteria NEGATIVE  NEG /hpf    UA:UC IF INDICATED CULTURE NOT INDICATED BY UA RESULT CNI      Mucus 1+ (A) NEG /lpf   CBC W/O DIFF   Result Value Ref Range    WBC 6.5 3.6 - 11.0 K/uL    RBC 3.61 (L) 3.80 - 5.20 M/uL    HGB 10.5 (L) 11.5 - 16.0 g/dL    HCT 33.4 (L) 35.0 - 47.0 %    MCV 92.5 80.0 - 99.0 FL    MCH 29.1 26.0 - 34.0 PG    MCHC 31.4 30.0 - 36.5 g/dL    RDW 12.8 11.5 - 14.5 %    PLATELET 991 779 - 259 K/uL    MPV 10.1 8.9 - 12.9 FL    NRBC 0.0 0  WBC    ABSOLUTE NRBC 0.00 0.00 - 0.01 K/uL   LIPID PANEL   Result Value Ref Range    LIPID PROFILE          Cholesterol, total 113 <200 MG/DL    Triglyceride 81 <150 MG/DL    HDL Cholesterol 50 MG/DL    LDL, calculated 46.8 0 - 100 MG/DL    VLDL, calculated 16.2 MG/DL    CHOL/HDL Ratio 2.3 0 - 5.0     HEMOGLOBIN A1C WITH EAG Result Value Ref Range    Hemoglobin A1c 8.0 (H) 4.2 - 6.3 %    Est. average glucose 269 mg/dL   METABOLIC PANEL, BASIC   Result Value Ref Range    Sodium 142 136 - 145 mmol/L    Potassium 3.5 3.5 - 5.1 mmol/L    Chloride 106 97 - 108 mmol/L    CO2 27 21 - 32 mmol/L    Anion gap 9 5 - 15 mmol/L    Glucose 119 (H) 65 - 100 mg/dL    BUN 21 (H) 6 - 20 MG/DL    Creatinine 1.45 (H) 0.55 - 1.02 MG/DL    BUN/Creatinine ratio 14 12 - 20      GFR est AA 42 (L) >60 ml/min/1.73m2    GFR est non-AA 34 (L) >60 ml/min/1.73m2    Calcium 8.5 8.5 - 10.1 MG/DL   GLUCOSE, POC   Result Value Ref Range    Glucose (POC) 119 (H) 65 - 100 mg/dL    Performed by Mitchell Brown    GLUCOSE, POC   Result Value Ref Range    Glucose (POC) 147 (H) 65 - 100 mg/dL    Performed by Richelle Le    GLUCOSE, POC   Result Value Ref Range    Glucose (POC) 163 (H) 65 - 100 mg/dL    Performed by Dania Rivera    GLUCOSE, POC   Result Value Ref Range    Glucose (POC) 112 (H) 65 - 100 mg/dL    Performed by Dania Rivera    GLUCOSE, POC   Result Value Ref Range    Glucose (POC) 105 (H) 65 - 100 mg/dL    Performed by Dania Rivera    EKG, 12 LEAD, INITIAL   Result Value Ref Range    Ventricular Rate 79 BPM    Atrial Rate 82 BPM    QRS Duration 100 ms    Q-T Interval 394 ms    QTC Calculation (Bezet) 451 ms    Calculated R Axis -33 degrees    Calculated T Axis 82 degrees    Diagnosis       Possible wandering atrial pacemaker with APC's.   Left axis deviation  Incomplete right bundle branch block  Moderate voltage criteria for LVH, may be normal variant  Nonspecific T wave abnormality , probably digitalis effect  When compared with ECG of 26-DEC-2016 10:42,  Possible wandering atrial pacemaker has repalced sinus rhythm    Confirmed by Sherryle Dec ([id number]) on 4/6/2018 12:01:03 AM          Exam:  Visit Vitals    BP (!) 166/93 (BP 1 Location: Right arm, BP Patient Position: Sitting)  Comment: blood pressure medication taken per patient    Pulse 71    Temp 97.7 °F (36.5 °C) (Temporal)    Resp 20    Ht 4' 11\" (1.499 m)    Wt 234 lb (106.1 kg)    SpO2 93%    BMI 47.26 kg/m2     General:  Alert, cooperative, no distress. Head:  Normocephalic, without obvious abnormality, atraumatic. Respiratory:  Heart:   Non labored breathing  Regular rate and rhythm, no murmurs   Neck:   2+ carotids, no bruits   Extremities: Warm, no cyanosis or edema. Pulses: 2+ radial pulses. Neurologic:  MS: Alert and oriented x 4, speech intact. Language intact, able to name, repeat, and follow all commands. Attention and fund of knowledge appropriate. Recent and remote memory intact. Cranial Nerves:  II: visual fields Full to confrontation   II: pupils Equal, round, reactive to light   II: optic disc No papilledema   III,VII: ptosis none   III,IV,VI: extraocular muscles  EOMI, no nystagmus or diplopia   V: facial light touch sensation  normal   VII: facial muscle function   symmetric   VIII: hearing intact   IX: soft palate elevation  normal   XI: trapezius strength  5/5   XI: sternocleidomastoid strength 5/5   XII: tongue  Midline     Motor: normal bulk and tone, no tremor              Strength: 5/5 throughout, no PD  Sensory: Dysesthesia to LT, PP, Temperature, Vibration  Coordination: FTN and HTS abnormal, SALOME abnormal,Romberg negative  Gait: Abnormal gait, unable to heel, toe, and tandem walk. Ambulates with cane  Reflexes: 2+ symmetric, toes downgoing           Assessment/Plan       ICD-10-CM ICD-9-CM    1. Transient cerebral ischemia, unspecified type G45.9 435.9    2. Paresthesia R20.2 782.0 EEG   3. Partial symptomatic epilepsy with complex partial seizures, not intractable, without status epilepticus ([facility] [address] G40.209 345.40 EEG   4. Gait disorder R26.9 781.2    Plan:  EEG  Continue rest of management  Will call family with result    Follow-up Disposition:  Return in about 6 months (around 10/25/2018).       Signed:  Mac Lozano MD  4/25/2018

## 2018-04-25 NOTE — PATIENT INSTRUCTIONS
ALL TEST RESULTS WILL BE DISCUSSED AT THE NEXT FOLLOW UP APPOINTMENT. Electroencephalogram (EEG): About This Test  What is it? An electroencephalogram (EEG) lets a doctor see the electrical activity of your brain. You will have small pads or patches attached to different places on your head. These are called electrodes. Wires connect the electrodes to a computer. The computer records the activity of the brain. This looks like wavy lines on the computer screen or on paper. Why is this test done? The test is often used to diagnose epilepsy. It helps a doctor know what types of seizures are happening. An EEG can also check brain activity in people with sleep disorders. It can also help a doctor know why a person passed out (lost consciousness). How can you prepare for the test?  · Tell your doctor if you are taking any medicines. Your doctor may ask you to stop taking certain medicines before the test. These include sedatives and tranquilizers, muscle relaxants, sleeping aids, and seizure medicines. · Do not eat or drink anything with caffeine in it for 12 hours before the test. This includes cola, energy drinks, and chocolate. · Shampoo your hair and rinse with clear water the evening before or the morning of the test. Do not put any hair conditioner or oil on after you wash your hair. · Your doctor may ask you not to sleep the night before the test or to sleep for only about 4 or 5 hours. This is because some types of brain activity can only be seen if you are asleep. If your doctor asks you to get less sleep than normal, plan to have someone drive you to and from the test.  What happens during the test?  · You will lie on your back on a bed or table. Or you might relax in a chair with your eyes closed. · A technologist will attach the electrodes to different places on your head. Or you might get a cap with fixed electrodes on it. · You will lie still with your eyes closed.  The technologist will tell you not to talk unless you need to. · The technologist may ask you to:  ¨ Breathe deeply and rapidly. This is called hyperventilating. ¨ Look at a bright, flashing light called a strobe. ¨ Go to sleep. If you can't fall asleep, you may get medicine to help you. What else should you know about the test?  · There is no pain. No electrical current goes through your body. · If you have a seizure disorder such as epilepsy, the flashing lights or hyperventilation may cause a seizure. The technologist is trained to take care of you if this happens. · If electrodes are put in your nose, they may tickle. In rare cases, they can also cause some soreness or a small amount of bleeding for 1 to 2 days after the test.  How long does the test take? · The test will take about 1 to 2 hours. What happens after the test?  · You will probably be able to go home right away. But if you didn't sleep your normal amount before the test, have someone drive you home. · You can go back to your usual activities right away. When should you call for help? Watch closely for changes in your health, and be sure to contact your doctor if:  · You have any problems that you think may be from the test.  · You have any questions about the test or have not received your results. Follow-up care is a key part of your treatment and safety. Be sure to make and go to all appointments, and call your doctor if you are having problems. It's also a good idea to keep a list of the medicines you take. Ask your doctor when you can expect to have your test results. Where can you learn more? Go to http://chase-wali.info/. Enter F196 in the search box to learn more about \"Electroencephalogram (EEG): About This Test.\"  Current as of: October 14, 2016  Content Version: 11.4  © 0101-6200 [employer].  Care instructions adapted under license by [employer] (which disclaims liability or warranty for this information). If you have questions about a medical condition or this instruction, always ask your healthcare professional. [place] [address] any warranty or liability for your use of this information.

## 2018-04-25 NOTE — MR AVS SNAPSHOT
[address] [place], [place] [address]andrés Boone 13 
[phone] Patient: Kt Coffman MRN: [id number] WXI:1/75/2039 Visit Information Date & Time Provider Department Dept. Phone Encounter #  
 4/25/2018  9:00 AM Mane Mendoza MD [facility] at [facility] [phone] [id number] Follow-up Instructions Return in about 6 months (around 10/25/2018). Upcoming Health Maintenance Date Due  
 FOOT EXAM Q1 1/29/1945 MICROALBUMIN Q1 1/29/1945 EYE EXAM RETINAL OR DILATED Q1 1/29/1945 DTaP/Tdap/Td series (1 - Tdap) 1/29/1956 ZOSTER VACCINE AGE 60> 11/29/1994 GLAUCOMA SCREENING Q2Y 1/29/2000 Bone Densitometry (Dexa) Screening 1/29/2000 Pneumococcal 65+ High/Highest Risk (2 of 2 - PPSV23) 12/11/2014 Influenza Age 5 to Adult 8/1/2017 MEDICARE YEARLY EXAM 3/14/2018 HEMOGLOBIN A1C Q6M 10/6/2018 LIPID PANEL Q1 4/6/2019 Allergies as of 4/25/2018  Review Complete On: 4/25/2018 By: Erlin Mcmillan MD  
  
 Severity Noted Reaction Type Reactions Coumadin [Warfarin]  04/11/2010   Side Effect Hives Current Immunizations  Never Reviewed Name Date Influenza Vaccine Whole 12/11/2009 ZZZ-RETIRED (DO NOT USE) Pneumococcal Vaccine (Unspecified Type) 12/11/2009 Not reviewed this visit You Were Diagnosed With   
  
 Codes Comments Transient cerebral ischemia, unspecified type    -  Primary ICD-10-CM: G45.9 ICD-9-CM: 435.9 Paresthesia     ICD-10-CM: R20.2 ICD-9-CM: 782.0 Partial symptomatic epilepsy with complex partial seizures, not intractable, without status epilepticus ([facility] [address]     ICD-10-CM: H75.013 ICD-9-CM: 345.40 Gait disorder     ICD-10-CM: R26.9 ICD-9-CM: 893. 2 Vitals BP Pulse Temp Resp Height(growth percentile) (!) 166/93 (BP 1 Location: Right arm, BP Patient Position: Sitting) 71 97.7 °F (36.5 °C) (Temporal) 20 4' 11\" (1.499 m) Weight(growth percentile) SpO2 BMI OB Status Smoking Status 234 lb (106.1 kg) 93% 47.26 kg/m2 Hysterectomy Never Smoker BMI and BSA Data Body Mass Index Body Surface Area  
 47.26 kg/m 2 2.1 m 2 Preferred Pharmacy Pharmacy Name Phone [facility] [address], [address] [place] [address] Eron Grant [phone] Your Updated Medication List  
  
   
This list is accurate as of 4/25/18  9:40 AM.  Always use your most recent med list.  
  
  
  
  
 aspirin delayed-release 81 mg tablet Take 81 mg by mouth daily. atorvastatin 40 mg tablet Commonly known as:  LIPITOR Take 40 mg by mouth daily. cholecalciferol 1,000 unit Cap Commonly known as:  VITAMIN D3  
daily. clopidogrel 75 mg Tab Commonly known as:  PLAVIX  
take 1 tablet by mouth once daily GENVOYA Tab tablet Generic drug:  elvitegravir-cobicistat-emtricitabine-tenofovir alafenamide Take 1 Tab by mouth daily (with breakfast). hydroCHLOROthiazide 25 mg tablet Commonly known as:  HYDRODIURIL Take 25 mg by mouth daily. isosorbide mononitrate ER 60 mg CR tablet Commonly known as:  IMDUR Take 60 mg by mouth every morning. NEW RX on 3/2/18  not yet started. liver oil-zinc oxide ointment Apply  to affected area as needed for Skin Irritation. losartan 100 mg tablet Commonly known as:  COZAAR Take 100 mg by mouth daily. metFORMIN 500 mg Tg24 24 hour tablet Commonly known Lissette Diaz ER Take 500 mg by mouth two (2) times a day. metoprolol succinate 100 mg tablet Commonly known as:  TOPROL-XL Take 100 mg by mouth daily. Follow-up Instructions Return in about 6 months (around 10/25/2018). To-Do List   
 04/25/2018 Neurology:  EEG Patient Instructions ALL TEST RESULTS WILL BE DISCUSSED AT THE NEXT FOLLOW UP APPOINTMENT. Electroencephalogram (EEG): About This Test 
What is it? An electroencephalogram (EEG) lets a doctor see the electrical activity of your brain. You will have small pads or patches attached to different places on your head. These are called electrodes. Wires connect the electrodes to a computer. The computer records the activity of the brain. This looks like wavy lines on the computer screen or on paper. Why is this test done? The test is often used to diagnose epilepsy. It helps a doctor know what types of seizures are happening. An EEG can also check brain activity in people with sleep disorders. It can also help a doctor know why a person passed out (lost consciousness). How can you prepare for the test? 
· Tell your doctor if you are taking any medicines. Your doctor may ask you to stop taking certain medicines before the test. These include sedatives and tranquilizers, muscle relaxants, sleeping aids, and seizure medicines. · Do not eat or drink anything with caffeine in it for 12 hours before the test. This includes cola, energy drinks, and chocolate. · Shampoo your hair and rinse with clear water the evening before or the morning of the test. Do not put any hair conditioner or oil on after you wash your hair. · Your doctor may ask you not to sleep the night before the test or to sleep for only about 4 or 5 hours. This is because some types of brain activity can only be seen if you are asleep. If your doctor asks you to get less sleep than normal, plan to have someone drive you to and from the test. 
What happens during the test? 
· You will lie on your back on a bed or table. Or you might relax in a chair with your eyes closed. · A technologist will attach the electrodes to different places on your head. Or you might get a cap with fixed electrodes on it. · You will lie still with your eyes closed. The technologist will tell you not to talk unless you need to. · The technologist may ask you to: ¨ Breathe deeply and rapidly. This is called hyperventilating. ¨ Look at a bright, flashing light called a strobe. ¨ Go to sleep. If you can't fall asleep, you may get medicine to help you. What else should you know about the test? 
· There is no pain. No electrical current goes through your body. · If you have a seizure disorder such as epilepsy, the flashing lights or hyperventilation may cause a seizure. The technologist is trained to take care of you if this happens. · If electrodes are put in your nose, they may tickle. In rare cases, they can also cause some soreness or a small amount of bleeding for 1 to 2 days after the test. 
How long does the test take? · The test will take about 1 to 2 hours. What happens after the test? 
· You will probably be able to go home right away. But if you didn't sleep your normal amount before the test, have someone drive you home. · You can go back to your usual activities right away. When should you call for help? Watch closely for changes in your health, and be sure to contact your doctor if: 
· You have any problems that you think may be from the test. 
· You have any questions about the test or have not received your results. Follow-up care is a key part of your treatment and safety. Be sure to make and go to all appointments, and call your doctor if you are having problems. It's also a good idea to keep a list of the medicines you take. Ask your doctor when you can expect to have your test results. Where can you learn more? Go to http://chase-wali.info/. Enter F196 in the search box to learn more about \"Electroencephalogram (EEG): About This Test.\" Current as of: October 14, 2016 Content Version: 11.4 © 4675-1394 [employer]. Care instructions adapted under license by [employer] (which disclaims liability or warranty for this information).  If you have questions about a medical condition or this instruction, always ask your healthcare professional. [address] any warranty or liability for your use of this information. Introducing [facility]! Dear Arjun Saldana: Thank you for requesting a [employer] account. Our records indicate that you already have an active [employer] account. You can access your account anytime at [email or web address] Did you know that you can access your hospital and ER discharge instructions at any time in [employer]? You can also review all of your test results from your hospital stay or ER visit. Additional Information If you have questions, please visit the Frequently Asked Questions section of the [employer] website at [email or web address] Remember, [employer] is NOT to be used for urgent needs. For medical emergencies, dial 911. Now available from your iPhone and Android! Please provide this summary of care documentation to your next provider. Your primary care clinician is listed as Chato Patricio. If you have any questions after today's visit, please call [phone].

## 2019-02-23 ENCOUNTER — HOSPITAL ENCOUNTER (EMERGENCY)
Age: 84
Discharge: HOME | End: 2019-02-23
Attending: EMERGENCY MEDICINE
Payer: MEDICARE

## 2019-02-23 ENCOUNTER — APPOINTMENT (OUTPATIENT)
Dept: GENERAL RADIOLOGY | Age: 84
End: 2019-02-23
Attending: EMERGENCY MEDICINE
Payer: MEDICARE

## 2019-02-23 VITALS
HEART RATE: 91 BPM | SYSTOLIC BLOOD PRESSURE: 156 MMHG | WEIGHT: 228.6 LBS | OXYGEN SATURATION: 97 % | RESPIRATION RATE: 18 BRPM | BODY MASS INDEX: 44.88 KG/M2 | HEIGHT: 60 IN | TEMPERATURE: 98 F | DIASTOLIC BLOOD PRESSURE: 65 MMHG

## 2019-02-23 DIAGNOSIS — M79.602 LEFT ARM PAIN: Primary | ICD-10-CM

## 2019-02-23 DIAGNOSIS — M25.532 LEFT WRIST PAIN: ICD-10-CM

## 2019-02-23 LAB
ALBUMIN SERPL-MCNC: 3.4 G/DL (ref 3.5–5)
ALBUMIN/GLOB SERPL: 0.7 {RATIO} (ref 1.1–2.2)
ALP SERPL-CCNC: 83 U/L (ref 45–117)
ALT SERPL-CCNC: 16 U/L (ref 12–78)
ANION GAP SERPL CALC-SCNC: 9 MMOL/L (ref 5–15)
AST SERPL-CCNC: 13 U/L (ref 15–37)
ATRIAL RATE: 90 BPM
BASOPHILS # BLD: 0 K/UL (ref 0–0.1)
BASOPHILS NFR BLD: 0 % (ref 0–1)
BILIRUB SERPL-MCNC: 0.7 MG/DL (ref 0.2–1)
BUN SERPL-MCNC: 13 MG/DL (ref 6–20)
BUN/CREAT SERPL: 9 (ref 12–20)
CALCIUM SERPL-MCNC: 9 MG/DL (ref 8.5–10.1)
CALCULATED P AXIS, ECG09: 40 DEGREES
CALCULATED R AXIS, ECG10: -35 DEGREES
CALCULATED T AXIS, ECG11: 73 DEGREES
CHLORIDE SERPL-SCNC: 100 MMOL/L (ref 97–108)
CO2 SERPL-SCNC: 28 MMOL/L (ref 21–32)
CREAT SERPL-MCNC: 1.47 MG/DL (ref 0.55–1.02)
DIAGNOSIS, 93000: NORMAL
DIFFERENTIAL METHOD BLD: NORMAL
EOSINOPHIL # BLD: 0 K/UL (ref 0–0.4)
EOSINOPHIL NFR BLD: 0 % (ref 0–7)
ERYTHROCYTE [DISTWIDTH] IN BLOOD BY AUTOMATED COUNT: 12.9 % (ref 11.5–14.5)
GLOBULIN SER CALC-MCNC: 4.9 G/DL (ref 2–4)
GLUCOSE BLD STRIP.AUTO-MCNC: 177 MG/DL (ref 65–100)
GLUCOSE SERPL-MCNC: 202 MG/DL (ref 65–100)
HCT VFR BLD AUTO: 37 % (ref 35–47)
HGB BLD-MCNC: 11.7 G/DL (ref 11.5–16)
IMM GRANULOCYTES # BLD AUTO: 0 K/UL (ref 0–0.04)
IMM GRANULOCYTES NFR BLD AUTO: 0 % (ref 0–0.5)
LYMPHOCYTES # BLD: 1.7 K/UL (ref 0.8–3.5)
LYMPHOCYTES NFR BLD: 18 % (ref 12–49)
MCH RBC QN AUTO: 29.4 PG (ref 26–34)
MCHC RBC AUTO-ENTMCNC: 31.6 G/DL (ref 30–36.5)
MCV RBC AUTO: 93 FL (ref 80–99)
MONOCYTES # BLD: 1 K/UL (ref 0–1)
MONOCYTES NFR BLD: 11 % (ref 5–13)
NEUTS SEG # BLD: 6.6 K/UL (ref 1.8–8)
NEUTS SEG NFR BLD: 70 % (ref 32–75)
NRBC # BLD: 0 K/UL (ref 0–0.01)
NRBC BLD-RTO: 0 PER 100 WBC
P-R INTERVAL, ECG05: 204 MS
PLATELET # BLD AUTO: 236 K/UL (ref 150–400)
PMV BLD AUTO: 10.5 FL (ref 8.9–12.9)
POTASSIUM SERPL-SCNC: 3.3 MMOL/L (ref 3.5–5.1)
PROT SERPL-MCNC: 8.3 G/DL (ref 6.4–8.2)
Q-T INTERVAL, ECG07: 370 MS
QRS DURATION, ECG06: 94 MS
QTC CALCULATION (BEZET), ECG08: 452 MS
RBC # BLD AUTO: 3.98 M/UL (ref 3.8–5.2)
SERVICE CMNT-IMP: ABNORMAL
SODIUM SERPL-SCNC: 137 MMOL/L (ref 136–145)
VENTRICULAR RATE, ECG03: 90 BPM
WBC # BLD AUTO: 9.5 K/UL (ref 3.6–11)

## 2019-02-23 PROCEDURE — 80053 COMPREHEN METABOLIC PANEL: CPT

## 2019-02-23 PROCEDURE — 93005 ELECTROCARDIOGRAM TRACING: CPT

## 2019-02-23 PROCEDURE — 73110 X-RAY EXAM OF WRIST: CPT

## 2019-02-23 PROCEDURE — 74011250637 HC RX REV CODE- 250/637: Performed by: EMERGENCY MEDICINE

## 2019-02-23 PROCEDURE — 36415 COLL VENOUS BLD VENIPUNCTURE: CPT

## 2019-02-23 PROCEDURE — 85025 COMPLETE CBC W/AUTO DIFF WBC: CPT

## 2019-02-23 PROCEDURE — 82962 GLUCOSE BLOOD TEST: CPT

## 2019-02-23 PROCEDURE — 99284 EMERGENCY DEPT VISIT MOD MDM: CPT

## 2019-02-23 RX ORDER — ACETAMINOPHEN 325 MG/1
650 TABLET ORAL ONCE
Status: COMPLETED | OUTPATIENT
Start: 2019-02-23 | End: 2019-02-23

## 2019-02-23 RX ORDER — COLCHICINE 0.6 MG/1
0.6 TABLET ORAL
Status: COMPLETED | OUTPATIENT
Start: 2019-02-23 | End: 2019-02-23

## 2019-02-23 RX ORDER — COLCHICINE 0.6 MG/1
0.3 TABLET ORAL DAILY
Qty: 3 TAB | Refills: 0 | Status: SHIPPED | OUTPATIENT
Start: 2019-02-23 | End: 2019-02-26

## 2019-02-23 RX ORDER — COLCHICINE 0.6 MG/1
0.3 TABLET ORAL DAILY
Qty: 2 TAB | Refills: 0 | Status: SHIPPED | OUTPATIENT
Start: 2019-02-23 | End: 2019-02-23

## 2019-02-23 RX ADMIN — ACETAMINOPHEN 650 MG: 325 TABLET, FILM COATED ORAL at 06:53

## 2019-02-23 RX ADMIN — COLCHICINE 0.6 MG: 0.6 TABLET, FILM COATED ORAL at 09:41

## 2019-02-23 NOTE — ED NOTES
Pt presents to ED ambulatory complaining of left arm swelling. Pt is alert and oriented x 4, RR even and unlabored, skin is warm and dry. Assessment completed and pt updated on plan of care. Emergency Department Nursing Plan of Care The Nursing Plan of Care is developed from the Nursing assessment and Emergency Department Attending provider initial evaluation. The plan of care may be reviewed in the ED Provider note. The Plan of Care was developed with the following considerations:  
Patient / Family readiness to learn indicated by:verbalized understanding Persons(s) to be included in education: patient Barriers to Learning/Limitations:No 
 
Signed Leonora Le RN   
2/23/2019   6:20 AM

## 2019-02-23 NOTE — DISCHARGE INSTRUCTIONS

## 2019-02-23 NOTE — ED PROVIDER NOTES
EMERGENCY DEPARTMENT HISTORY AND PHYSICAL EXAM 
 
 
Date: 2/23/2019 Patient Name: Lorena Arriola History of Presenting Illness Chief Complaint Patient presents with  Arm swelling History Provided By: Patient and Patient's Daughter HPI: Lorena Arriola, 80 y.o. female with PMHx significant for CAD s/p stents, HTN, DM, TIA, presents ambulatory to the ED with cc of arm swelling. Notes pain and swelling in her LUE x 2 days. Progressively worsening. No exacerbating or alleviating factors. No prior hx of similar sx. Came to the ED this AM because felt sx worsening. Has taken \"pain pill\" at home with moderate relief. Denies any falls or trauma to the area. No history of gout or previous joint swelling. No history of clots in the past. No fever, CP, SOB, abd pain, N/V. No LE swelling. PCP: Neal Ramos MD 
 
There are no other complaints, changes, or physical findings at this time. Current Facility-Administered Medications Medication Dose Route Frequency Provider Last Rate Last Dose  colchicine tablet 0.6 mg  0.6 mg Oral NOW Charlott Claude, MD      
 
Current Outpatient Medications Medication Sig Dispense Refill  colchicine 0.6 mg tablet Take 0.5 Tabs by mouth daily for 3 days. 2 Tab 0  cholecalciferol (VITAMIN D3) 1,000 unit cap daily. 1  
 clopidogrel (PLAVIX) 75 mg tab take 1 tablet by mouth once daily  0  
 losartan (COZAAR) 100 mg tablet Take 100 mg by mouth daily.  isosorbide mononitrate ER (IMDUR) 60 mg CR tablet Take 60 mg by mouth every morning. NEW RX on 3/2/18  not yet started.  liver oil-zinc oxide ointment Apply  to affected area as needed for Skin Irritation.  aspirin delayed-release 81 mg tablet Take 81 mg by mouth daily.  atorvastatin (LIPITOR) 40 mg tablet Take 40 mg by mouth daily.  hydroCHLOROthiazide (HYDRODIURIL) 25 mg tablet Take 25 mg by mouth daily.  metFORMIN (GLUMETZA ER) 500 mg TG24 24 hour tablet Take 500 mg by mouth two (2) times a day.  metoprolol succinate (TOPROL-XL) 100 mg tablet Take 100 mg by mouth daily.  elvitegravir-cobicistat-emtricitabine-tenofovir alafenamide (GENVOYA) tab tablet Take 1 Tab by mouth daily (with breakfast). Past History Past Medical History: 
Past Medical History:  
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 
Past Surgical History:  
Procedure Laterality Date [address] Stent Placement  HX CHOLECYSTECTOMY  HX HEENT    
 cataract  HX HYSTERECTOMY  HX MYOMECTOMY  HX ORTHOPAEDIC Family History: 
Family History Problem Relation Age of Onset  Diabetes Other  Hypertension Other  Stroke Daughter  Other Daughter   
     sarcoidosis  Other Grandchild   
     sarcoidosis Social History: 
Social History Tobacco Use  Smoking status: Never Smoker  Smokeless tobacco: Never Used Substance Use Topics  Alcohol use: No  
 Drug use: No  
 
Allergies: Allergies Allergen Reactions  Coumadin [Warfarin] Hives Review of Systems Review of Systems Constitutional: Negative for chills and fever. HENT: Negative for congestion, rhinorrhea and sore throat. Respiratory: Negative for cough and shortness of breath. Cardiovascular: Negative for chest pain. Gastrointestinal: Negative for abdominal pain, nausea and vomiting. Genitourinary: Negative for dysuria and urgency. Musculoskeletal: Positive for arthralgias, joint swelling (L wrist) and myalgias. L hand and arm swelling Skin: Negative for rash. Neurological: Negative for dizziness, light-headedness and headaches. All other systems reviewed and are negative.  
 
Physical Exam  
 Physical Exam  
Constitutional: She is oriented to person, place, and time. She appears well-developed and well-nourished. No distress. HENT:  
Head: Normocephalic and atraumatic. Eyes: Conjunctivae and EOM are normal. Pupils are equal, round, and reactive to light. Neck: Normal range of motion. Cardiovascular: Normal rate, regular rhythm and intact distal pulses. Pulmonary/Chest: Effort normal and breath sounds normal. No stridor. No respiratory distress. Abdominal: Soft. She exhibits no distension. There is no tenderness. Musculoskeletal: Normal range of motion. She exhibits edema (LUE). LUE swollen as compared to right, extends from fingers up to the elbow, most notable at wrist. Tender over hand and wrist. Trace pitting noted. No erythema or warmth. 2+ radial pulse, ROM of the fingers limited by swelling/pain. Normal cap refill distally. Neurological: She is alert and oriented to person, place, and time. Skin: Skin is warm and dry. Psychiatric: She has a normal mood and affect. Nursing note and vitals reviewed. Diagnostic Study Results Labs - Recent Results (from the past 12 hour(s)) GLUCOSE, POC Collection Time: 02/23/19  6:11 AM  
Result Value Ref Range Glucose (POC) 177 (H) 65 - 100 mg/dL Performed by Alexia Booth EKG, 12 LEAD, INITIAL Collection Time: 02/23/19  6:13 AM  
Result Value Ref Range Ventricular Rate 90 BPM  
 Atrial Rate 90 BPM  
 P-R Interval 204 ms QRS Duration 94 ms Q-T Interval 370 ms QTC Calculation (Bezet) 452 ms Calculated P Axis 40 degrees Calculated R Axis -35 degrees Calculated T Axis 73 degrees Diagnosis Sinus rhythm with premature atrial complexes Left axis deviation Incomplete right bundle branch block Voltage criteria for left ventricular hypertrophy Abnormal ECG When compared with ECG of 05-APR-2018 14:34, 
Previous ECG has undetermined rhythm, needs review CBC WITH AUTOMATED DIFF  
 Collection Time: 02/23/19  6:34 AM  
Result Value Ref Range WBC 9.5 3.6 - 11.0 K/uL  
 RBC 3.98 3.80 - 5.20 M/uL  
 HGB 11.7 11.5 - 16.0 g/dL HCT 37.0 35.0 - 47.0 % MCV 93.0 80.0 - 99.0 FL  
 MCH 29.4 26.0 - 34.0 PG  
 MCHC 31.6 30.0 - 36.5 g/dL  
 RDW 12.9 11.5 - 14.5 % PLATELET 873 074 - 012 K/uL MPV 10.5 8.9 - 12.9 FL  
 NRBC 0.0 0  WBC ABSOLUTE NRBC 0.00 0.00 - 0.01 K/uL NEUTROPHILS 70 32 - 75 % LYMPHOCYTES 18 12 - 49 % MONOCYTES 11 5 - 13 % EOSINOPHILS 0 0 - 7 % BASOPHILS 0 0 - 1 % IMMATURE GRANULOCYTES 0 0.0 - 0.5 % ABS. NEUTROPHILS 6.6 1.8 - 8.0 K/UL  
 ABS. LYMPHOCYTES 1.7 0.8 - 3.5 K/UL  
 ABS. MONOCYTES 1.0 0.0 - 1.0 K/UL  
 ABS. EOSINOPHILS 0.0 0.0 - 0.4 K/UL  
 ABS. BASOPHILS 0.0 0.0 - 0.1 K/UL  
 ABS. IMM. GRANS. 0.0 0.00 - 0.04 K/UL  
 DF AUTOMATED METABOLIC PANEL, COMPREHENSIVE Collection Time: 02/23/19  6:34 AM  
Result Value Ref Range Sodium 137 136 - 145 mmol/L Potassium 3.3 (L) 3.5 - 5.1 mmol/L Chloride 100 97 - 108 mmol/L  
 CO2 28 21 - 32 mmol/L Anion gap 9 5 - 15 mmol/L Glucose 202 (H) 65 - 100 mg/dL BUN 13 6 - 20 MG/DL Creatinine 1.47 (H) 0.55 - 1.02 MG/DL  
 BUN/Creatinine ratio 9 (L) 12 - 20 GFR est AA 41 (L) >60 ml/min/1.73m2 GFR est non-AA 34 (L) >60 ml/min/1.73m2 Calcium 9.0 8.5 - 10.1 MG/DL Bilirubin, total 0.7 0.2 - 1.0 MG/DL  
 ALT (SGPT) 16 12 - 78 U/L  
 AST (SGOT) 13 (L) 15 - 37 U/L Alk. phosphatase 83 45 - 117 U/L Protein, total 8.3 (H) 6.4 - 8.2 g/dL Albumin 3.4 (L) 3.5 - 5.0 g/dL Globulin 4.9 (H) 2.0 - 4.0 g/dL A-G Ratio 0.7 (L) 1.1 - 2.2 Radiologic Studies -  
XR WRIST LT AP/LAT/OBL MIN 3V Final Result IMPRESSION: No acute osseous or articular abnormality. Soft tissue swelling. Xr Wrist Lt Ap/lat/obl Min 3v Result Date: 2/23/2019 IMPRESSION: No acute osseous or articular abnormality. Soft tissue swelling. Medical Decision Making I am the first provider for this patient. I reviewed the vital signs, available nursing notes, past medical history, past surgical history, family history and social history. Vital Signs-Reviewed the patient's vital signs. Patient Vitals for the past 12 hrs: 
 Temp Pulse Resp BP SpO2  
02/23/19 0845     97 % 02/23/19 0800    156/65 96 % 02/23/19 0730    141/60 98 % 02/23/19 0700    152/58 99 % 02/23/19 0603 98 °F (36.7 °C) 91 18 168/79 98 % Pulse Oximetry Analysis - 98% on RA Cardiac Monitor:  
Rate: 91 bpm 
Rhythm: Normal Sinus Rhythm ED EKG interpretation: 
Rhythm: normal sinus rhythm; and regular . Rate (approx.): 90; Axis: left axis deviation; P wave: normal; QRS interval: normal ; ST/T wave: normal; Other findings: abnormal ekg. This EKG was interpreted by KHANH Coto MD,ED Provider. Records Reviewed: Nursing Notes, Old Medical Records and Previous electrocardiograms Provider Notes (Medical Decision Making):  
Ddx: DVT, gout, pseudogout, doubt fx given no hx of trauma, doubt septic joint as no erythema/warmth and no fever Will check basic labs, xray, US of LUE, tx pain, re-eval 
 
ED Course:  
Initial assessment performed. The patients presenting problems have been discussed, and they are in agreement with the care plan formulated and outlined with them. I have encouraged them to ask questions as they arise throughout their visit. Progress Note: SIGN OUT 
7:13 AM 
Patient's presentation, labs/imaging and plan of care was reviewed with Neelam Eddy MD as part of sign out. They will f/u imaging as part of the plan discussed with the patient. Dr. Ramandeep Coreas assistance in completion of this plan is greatly appreciated, but it should be noted that I will be the provider of record for this patient. Manuel Ambrocio MD 
 
9:36 AM 
US neg for DVT Discussed with pharmacist appropriate dosing of colchicine and decided on 0.6mg 1 time here in ED and 0.3mg daily for 3 days. Written by Jennifer Bullock ED Scribe, as dictated by [address] [facility] Gabriela Smith MD 
 
Disposition: 
Discharge Note: 
9:36 AM 
The patient has been re-evaluated and is ready for discharge. Reviewed available results with patient. Counseled patient/parent/guardian on diagnosis and care plan. Patient has expressed understanding, and all questions have been answered. Patient agrees with plan and agrees to follow up as recommended, or return to the ED if their symptoms worsen. Discharge instructions have been provided and explained to the patient, along with reasons to return to the ED. PLAN: 
1. Current Discharge Medication List  
  
START taking these medications Details  
colchicine 0.6 mg tablet Take 0.5 Tabs by mouth daily for 3 days. Qty: 2 Tab, Refills: 0  
  
  
 
2. Follow-up Information Follow up With Specialties Details Why Contact Info Miah Hayward MD Internal Medicine Schedule an appointment as soon as possible for a visit in 2 days  [facility] [address] [address] 
[phone] [facility]  Schedule an appointment as soon as possible for a visit in 1 day  [facility] [address] [facility] 
[phone] Return to ED if worse Diagnosis Clinical Impression: 1. Left arm pain 2. Left wrist pain This note will not be viewable in [address]

## 2019-02-23 NOTE — PROGRESS NOTES
Attempted to contact on-call vascular tech for ordered study. No answer on personal cell phone, message left for him to call back. Page out on pager, waiting for his response.  
 
[phone] On-call tech, [phone] Gabbie Graves reached by phone and stated he would be in soon.

## 2019-02-23 NOTE — ED NOTES
Night supervisor has been informed that pt needs a venous doppler, she will call staff in to do it. Pt remains alert and oriented, family at bedside. Pt has had Tylenol for pain, pain is 10/10 per pt.

## 2019-02-26 ENCOUNTER — HOSPITAL ENCOUNTER (OUTPATIENT)
Dept: VASCULAR SURGERY | Age: 84
Discharge: HOME | End: 2019-02-26
Attending: EMERGENCY MEDICINE
Payer: MEDICAID

## 2019-02-26 PROCEDURE — 93971 EXTREMITY STUDY: CPT

## 2020-12-09 NOTE — ED NOTES
Bedside shift change report given to [address] [place] [address]  (oncoming nurse) by Chung Carrillo RN 
 (offgoing nurse). Report included the following information SBAR. [Daily] : ~He/She~ states the symptoms seem to be occuring daily [Rest] : relieved by rest [de-identified] : Patient is here today to discuss possible lumbar surgery. Patient states last evaluation by DR Gonzalez was 2018. Patient goes to pain management and they recommended  for spine surgery. Patient had cervical and lumbar mri's in November told needs low back surgery not cervical. Patient wants another spine opinion.\par Reports bilateral hand ring, little finger right worse than left pain, numbness\par  [de-identified] : any type of activity worse end of day

## 2021-04-06 ENCOUNTER — HOSPITAL ENCOUNTER (EMERGENCY)
Age: 86
Discharge: HOME | End: 2021-04-06
Attending: EMERGENCY MEDICINE
Payer: MEDICARE

## 2021-04-06 ENCOUNTER — APPOINTMENT (OUTPATIENT)
Dept: GENERAL RADIOLOGY | Age: 86
End: 2021-04-06
Attending: EMERGENCY MEDICINE
Payer: MEDICARE

## 2021-04-06 VITALS
BODY MASS INDEX: 40.59 KG/M2 | SYSTOLIC BLOOD PRESSURE: 152 MMHG | HEART RATE: 65 BPM | OXYGEN SATURATION: 97 % | DIASTOLIC BLOOD PRESSURE: 69 MMHG | WEIGHT: 215 LBS | HEIGHT: 61 IN | TEMPERATURE: 97.8 F | RESPIRATION RATE: 16 BRPM

## 2021-04-06 DIAGNOSIS — R60.0 LOWER LEG EDEMA: Primary | ICD-10-CM

## 2021-04-06 LAB
ANION GAP SERPL CALC-SCNC: 9 MMOL/L (ref 5–15)
BASOPHILS # BLD: 0 K/UL (ref 0–0.1)
BASOPHILS NFR BLD: 1 % (ref 0–1)
BNP SERPL-MCNC: 2884 PG/ML (ref 0–450)
BUN SERPL-MCNC: 35 MG/DL (ref 6–20)
BUN/CREAT SERPL: 15 (ref 12–20)
CALCIUM SERPL-MCNC: 9.5 MG/DL (ref 8.5–10.1)
CHLORIDE SERPL-SCNC: 105 MMOL/L (ref 97–108)
CO2 SERPL-SCNC: 26 MMOL/L (ref 21–32)
CREAT SERPL-MCNC: 2.39 MG/DL (ref 0.55–1.02)
DIFFERENTIAL METHOD BLD: ABNORMAL
EOSINOPHIL # BLD: 0.1 K/UL (ref 0–0.4)
EOSINOPHIL NFR BLD: 2 % (ref 0–7)
ERYTHROCYTE [DISTWIDTH] IN BLOOD BY AUTOMATED COUNT: 16.8 % (ref 11.5–14.5)
GLUCOSE SERPL-MCNC: 119 MG/DL (ref 65–100)
HCT VFR BLD AUTO: 26 % (ref 35–47)
HGB BLD-MCNC: 8.1 G/DL (ref 11.5–16)
IMM GRANULOCYTES # BLD AUTO: 0 K/UL (ref 0–0.04)
IMM GRANULOCYTES NFR BLD AUTO: 1 % (ref 0–0.5)
LYMPHOCYTES # BLD: 0.9 K/UL (ref 0.8–3.5)
LYMPHOCYTES NFR BLD: 24 % (ref 12–49)
MCH RBC QN AUTO: 27 PG (ref 26–34)
MCHC RBC AUTO-ENTMCNC: 31.2 G/DL (ref 30–36.5)
MCV RBC AUTO: 86.7 FL (ref 80–99)
MONOCYTES # BLD: 0.6 K/UL (ref 0–1)
MONOCYTES NFR BLD: 15 % (ref 5–13)
NEUTS SEG # BLD: 2.2 K/UL (ref 1.8–8)
NEUTS SEG NFR BLD: 57 % (ref 32–75)
NRBC # BLD: 0 K/UL (ref 0–0.01)
NRBC BLD-RTO: 0 PER 100 WBC
PLATELET # BLD AUTO: 143 K/UL (ref 150–400)
PMV BLD AUTO: 11 FL (ref 8.9–12.9)
POTASSIUM SERPL-SCNC: 3.9 MMOL/L (ref 3.5–5.1)
RBC # BLD AUTO: 3 M/UL (ref 3.8–5.2)
SODIUM SERPL-SCNC: 140 MMOL/L (ref 136–145)
TROPONIN I SERPL-MCNC: <0.05 NG/ML
WBC # BLD AUTO: 3.8 K/UL (ref 3.6–11)

## 2021-04-06 PROCEDURE — 96374 THER/PROPH/DIAG INJ IV PUSH: CPT

## 2021-04-06 PROCEDURE — 93005 ELECTROCARDIOGRAM TRACING: CPT

## 2021-04-06 PROCEDURE — 80048 BASIC METABOLIC PNL TOTAL CA: CPT

## 2021-04-06 PROCEDURE — 99283 EMERGENCY DEPT VISIT LOW MDM: CPT

## 2021-04-06 PROCEDURE — 83880 ASSAY OF NATRIURETIC PEPTIDE: CPT

## 2021-04-06 PROCEDURE — 85025 COMPLETE CBC W/AUTO DIFF WBC: CPT

## 2021-04-06 PROCEDURE — 36415 COLL VENOUS BLD VENIPUNCTURE: CPT

## 2021-04-06 PROCEDURE — 71045 X-RAY EXAM CHEST 1 VIEW: CPT

## 2021-04-06 PROCEDURE — 74011250636 HC RX REV CODE- 250/636: Performed by: EMERGENCY MEDICINE

## 2021-04-06 PROCEDURE — 84484 ASSAY OF TROPONIN QUANT: CPT

## 2021-04-06 RX ORDER — NITROFURANTOIN (MACROCRYSTALS) 100 MG/1
100 CAPSULE ORAL
COMMUNITY
End: 2021-12-19

## 2021-04-06 RX ORDER — SODIUM CHLORIDE 0.9 % (FLUSH) 0.9 %
5-40 SYRINGE (ML) INJECTION EVERY 8 HOURS
Status: DISCONTINUED | OUTPATIENT
Start: 2021-04-06 | End: 2021-04-07 | Stop reason: HOSPADM

## 2021-04-06 RX ORDER — TRIAMCINOLONE ACETONIDE 1 MG/G
CREAM TOPICAL
COMMUNITY

## 2021-04-06 RX ORDER — KETOROLAC TROMETHAMINE 30 MG/ML
15 INJECTION, SOLUTION INTRAMUSCULAR; INTRAVENOUS
Status: COMPLETED | OUTPATIENT
Start: 2021-04-06 | End: 2021-04-06

## 2021-04-06 RX ADMIN — Medication 10 ML: at 21:01

## 2021-04-06 RX ADMIN — KETOROLAC TROMETHAMINE 15 MG: 30 INJECTION, SOLUTION INTRAMUSCULAR; INTRAVENOUS at 20:58

## 2021-04-06 NOTE — ED TRIAGE NOTES
Pt reports bilateral leg swelling and abdominal swelling x2 months. Pt presents with family who report pt PCP cannot see her until next week.

## 2021-04-07 LAB
ATRIAL RATE: 67 BPM
CALCULATED P AXIS, ECG09: 86 DEGREES
CALCULATED R AXIS, ECG10: -58 DEGREES
CALCULATED T AXIS, ECG11: 91 DEGREES
DIAGNOSIS, 93000: NORMAL
P-R INTERVAL, ECG05: 240 MS
Q-T INTERVAL, ECG07: 390 MS
QRS DURATION, ECG06: 96 MS
QTC CALCULATION (BEZET), ECG08: 412 MS
VENTRICULAR RATE, ECG03: 67 BPM

## 2021-04-07 NOTE — DISCHARGE INSTRUCTIONS
It was a pleasure taking care of you in our Emergency Department today. We know that when you come to [facility], you are entrusting us with your health, comfort, and safety. Our physicians and nurses honor that trust, and truly appreciate the opportunity to care for you and your loved ones. We also value your feedback. If you receive a survey about your Emergency Department experience today, please fill it out. We care about our patients' feedback, and we listen to what you have to say. Thank you!

## 2021-04-07 NOTE — ED NOTES
..Discharge summary and discharge medications reviewed with patient and appropriate educational materials and side effects teaching were provided. patient  Given 0 paper prescriptions and 0 electronic prescriptions sent to pt's listed pharmacy. Patient and caregiver verbalized understanding of the importance of discussing medications with his or her physician or clinic they will be following up with. No si/s of acute distress prior to discharge. Patient offered wheelchair from treatment area to hospital entrance, patient utilized  wheelchair.

## 2021-04-07 NOTE — ED PROVIDER NOTES
EMERGENCY DEPARTMENT HISTORY AND PHYSICAL EXAM      Date: 4/6/2021  Patient Name: Alisha Hollins    History of Presenting Illness     Chief Complaint: SOB; swelling  79 yo F with PMHx including CAD, HTN, and DM presents to ED for increased for ~1 week of worsening of her chronic b/l LE swelling that became a/w SOB, abdominal swelling, and lightheadedness over the past ~24 hours. She reports moderate, burning, intermittent pain in her b/l LE, but denies calf pain. No CP, or palpitations. No fevers, and chills. Otherwise denies medical complaints as outlined in the ROS. PCP: Jeremy Carrillo MD    No current facility-administered medications on file prior to encounter. Current Outpatient Medications on File Prior to Encounter   Medication Sig Dispense Refill    nitrofurantoin (MACRODANTIN) 100 mg capsule Take 100 mg by mouth nightly.  triamcinolone acetonide (KENALOG) 0.1 % topical cream Apply  to affected area two (2) times daily as needed for Skin Irritation. use thin layer      cholecalciferol (VITAMIN D3) 1,000 unit cap daily. 1    aspirin delayed-release 81 mg tablet Take 81 mg by mouth daily.  atorvastatin (LIPITOR) 40 mg tablet Take 40 mg by mouth daily.  hydroCHLOROthiazide (HYDRODIURIL) 25 mg tablet Take 25 mg by mouth daily.  clopidogrel (PLAVIX) 75 mg tab take 1 tablet by mouth once daily  0    losartan (COZAAR) 100 mg tablet Take 100 mg by mouth daily.  isosorbide mononitrate ER (IMDUR) 60 mg CR tablet Take 60 mg by mouth every morning. NEW RX on 3/2/18  not yet started.  liver oil-zinc oxide ointment Apply  to affected area as needed for Skin Irritation.  metFORMIN (GLUMETZA ER) 500 mg TG24 24 hour tablet Take 500 mg by mouth two (2) times a day.  metoprolol succinate (TOPROL-XL) 100 mg tablet Take 100 mg by mouth daily.       elvitegravir-cobicistat-emtricitabine-tenofovir alafenamide (GENVOYA) tab tablet Take 1 Tab by mouth daily (with breakfast). 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       Past Surgical History:  Past Surgical History:   Procedure Laterality Date    CARDIAC SURG PROCEDURE UNLIST  1995    Stent Placement    HX CHOLECYSTECTOMY      HX HEENT      cataract    HX HYSTERECTOMY      HX MYOMECTOMY      HX ORTHOPAEDIC         Family History:  Family History   Problem Relation Age of Onset    Diabetes Other     Hypertension Other     Stroke Daughter     Other Daughter         sarcoidosis    Other Grandchild         sarcoidosis       Social History:  Social History     Tobacco Use    Smoking status: Never Smoker    Smokeless tobacco: Never Used   Substance Use Topics    Alcohol use: No    Drug use: No       Allergies: Allergies   Allergen Reactions    Coumadin [Warfarin] Hives    Penicillanic Sulfone Bl Beta-Lactamase Inhibitors Rash         Review of Systems   Review of Systems   Constitutional: Negative for chills, fatigue and fever. HENT: Negative for congestion and sore throat. Eyes: Negative. Respiratory: Positive for shortness of breath. Negative for cough. Cardiovascular: Negative for chest pain and palpitations. Gastrointestinal: Positive for abdominal distention. Negative for abdominal pain, nausea and vomiting. Genitourinary: Negative for dysuria. Musculoskeletal:        Extremity pain, swelling   Skin: Negative. Neurological: Negative for dizziness, weakness, light-headedness and headaches. Psychiatric/Behavioral: Negative. All other systems reviewed and are negative. Physical Exam   Physical Exam  Vitals signs and nursing note reviewed. Constitutional:       General: She is not in acute distress. Appearance: She is not toxic-appearing.    HENT:      Head: Normocephalic and atraumatic. Mouth/Throat:      Mouth: Mucous membranes are moist.   Eyes:      Pupils: Pupils are equal, round, and reactive to light. Neck:      Musculoskeletal: Normal range of motion. Cardiovascular:      Rate and Rhythm: Normal rate and regular rhythm. Pulses: Normal pulses. Pulmonary:      Effort: Pulmonary effort is normal. No respiratory distress. Comments: Slightly diminished in all fields b/l. BS equal throughout. Abdominal:      Palpations: Abdomen is soft. Tenderness: There is no abdominal tenderness. There is no guarding or rebound. Musculoskeletal:      Comments: Swelling of b/l LE. Calves are wrapped in compression wraps, but are symmetric, nt. No asymmetric swelling. Skin:     General: Skin is warm. Findings: No rash. Neurological:      Mental Status: She is alert and oriented to person, place, and time. Sensory: No sensory deficit. Diagnostic Study Results     Labs -     Recent Results (from the past 12 hour(s))   EKG, 12 LEAD, INITIAL    Collection Time: 04/06/21  8:06 PM   Result Value Ref Range    Ventricular Rate 67 BPM    Atrial Rate 67 BPM    P-R Interval 240 ms    QRS Duration 96 ms    Q-T Interval 390 ms    QTC Calculation (Bezet) 412 ms    Calculated P Axis 86 degrees    Calculated R Axis -58 degrees    Calculated T Axis 91 degrees    Diagnosis       Sinus rhythm with 1st degree AV block  Left anterior fascicular block  Anterior infarct , age undetermined  Abnormal ECG  When compared with ECG of 23-FEB-2019 06:13,  premature atrial complexes are no longer present  MT interval has increased  Incomplete right bundle branch block is no longer present  Anterior infarct is now present         Radiologic Studies -   XR CHEST PORT   Final Result   No acute cardiopulmonary process. Unchanged cardiomegaly.            CT Results  (Last 48 hours)    None        CXR Results  (Last 48 hours)               04/06/21 2005  XR CHEST PORT Final result    Impression:  No acute cardiopulmonary process. Unchanged cardiomegaly. Narrative:  EXAM:  XR CHEST PORT       INDICATION:   chest pain       COMPARISON: Chest radiograph 2016. FINDINGS: AP radiograph of the chest was obtained. No evidence of focal consolidation. No pleural effusion or pneumothorax. Unchanged cardiomegaly. Calcifications of the thoracic aorta. No acute osseous   abnormalities. Medical Decision Making   I am the first provider for this patient. I reviewed the vital signs, available nursing notes, past medical history, past surgical history, family history and social history. Vital Signs-Reviewed the patient's vital signs. Patient Vitals for the past 12 hrs:   Temp Pulse Resp BP SpO2   21 1900 97.8 °F (36.6 °C) 67 16 (!) 152/58 96 %     EK. Sinus rhythm at 67. Left anterior fascicular block. No STEMI. No acute ischemic changes. Records Reviewed: Nursing notes    Provider Notes (Medical Decision Making):   DDx: CHF, PNA, PVD, anasarca    ED Course:   Initial assessment performed and necessary diagnostic studies and ED treatments ordered. Progress Note:  9:24 PM  Discussed reassuring workup including labs and imaging, need for primary follow up, and return precautions as listed in the discharge plan below. Pt understands and agrees. Ready for home. Disposition:  DISCHARGE  9:24 PM  The patient has been re-evaluated and is ready for discharge. Reviewed available results with patient. Counseled pt on diagnosis and care plan. Pt has expressed understanding, and all questions have been answered. Pt agrees with plan and agrees to follow up as recommended, or return to the ED if their symptoms worsen. Discharge instructions have been provided and explained to the pt, along with reasons to return to the ED. DISCHARGE PLAN:  1. Current Discharge Medication List        2.    Follow-up Information    None 3.  Return to ED if worse     Diagnosis     Clinical Impression: No diagnosis found. Attestations: This note is prepared by [facility], acting as Scribe for Alec Mares MD.     Alec Mares MD. The scribe's documentation has been prepared under my direction and personally reviewed by me in its entirety. I confirm that the note above accurately reflects all work, treatment, procedures and medical decision making performed by me. Class II - visualization of the soft palate, fauces, and uvula

## 2021-04-07 NOTE — ED NOTES
79yo F reports to this ED accompanied by her daughter with c/o bilateral leg swelling and 9/10 leg pain and abdominal swelling and pressure. Reports that her PCP at [facility] is unable to see her until next week. Daughter reports that they can normally \"prop her legs up\" and provide relief, but today pt was unable to get relief at home. PMHx of controlled diabetes, HTN, and hyperlipidemia. Denies N/V/D. Denies fevers and chills. Denies urinary symptoms. Pt is alert, oriented, and appropriate at this time. Emergency Department Nursing Plan of Care       The Nursing Plan of Care is developed from the Nursing assessment and Emergency Department Attending provider initial evaluation. The plan of care may be reviewed in the ED Provider note.     The Plan of Care was developed with the following considerations:   Patient / Family readiness to learn indicated by:verbalized understanding  Persons(s) to be included in education: patient  Barriers to Learning/Limitations:No    Signed     Noemy Miller, RN    4/6/2021   8:51 PM

## 2021-04-18 ENCOUNTER — APPOINTMENT (OUTPATIENT)
Dept: CT IMAGING | Age: 86
End: 2021-04-18
Attending: EMERGENCY MEDICINE
Payer: MEDICARE

## 2021-04-18 ENCOUNTER — HOSPITAL ENCOUNTER (EMERGENCY)
Age: 86
Discharge: HOME | End: 2021-04-18
Attending: EMERGENCY MEDICINE
Payer: MEDICARE

## 2021-04-18 VITALS
SYSTOLIC BLOOD PRESSURE: 136 MMHG | BODY MASS INDEX: 39.65 KG/M2 | DIASTOLIC BLOOD PRESSURE: 61 MMHG | WEIGHT: 210 LBS | RESPIRATION RATE: 16 BRPM | TEMPERATURE: 98.3 F | HEART RATE: 66 BPM | HEIGHT: 61 IN | OXYGEN SATURATION: 96 %

## 2021-04-18 DIAGNOSIS — S00.93XA TRAUMATIC HEMATOMA OF HEAD, INITIAL ENCOUNTER: ICD-10-CM
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
## 2021-04-18 DIAGNOSIS — G44.319 ACUTE POST-TRAUMATIC HEADACHE, NOT INTRACTABLE: ICD-10-CM

## 2021-04-18 PROCEDURE — 99282 EMERGENCY DEPT VISIT SF MDM: CPT

## 2021-04-18 PROCEDURE — 70450 CT HEAD/BRAIN W/O DYE: CPT

## 2021-04-18 NOTE — ED NOTES
Patient and daughter given copy of dc instructions and 0 paper script(s) and 0 electronic scripts. Patient and daughter verbalized understanding of instructions and script (s). Patient and daughter given a current medication reconciliation form and verbalized understanding of their medications. Patient and daughter  verbalized understanding of the importance of discussing medications with  his or her physician or clinic they will be following up with. Patient alert and oriented and in no acute distress. Patient offered wheelchair from treatment area to hospital entrance, patient taken by wheelchair, assisted by her daughter.

## 2021-04-18 NOTE — ED NOTES
Patient brought here to hospital by her daughter with c/o fall. Patient states that she was walking to the bathroom around midnight last night when she fell and hit her head. Patient's daughter states that she was there with her mother after the fall, states they stayed awake for about 3 hours to monitor. Patient and daughter report that she was fine over night however slight concerns for brief confusion. Patient states that she is on blood thinners. Patient alr and oriented on arrival.  Patient with small hematoma to left upper head. Emergency Department Nursing Plan of Care       The Nursing Plan of Care is developed from the Nursing assessment and Emergency Department Attending provider initial evaluation. The plan of care may be reviewed in the ED Provider note.     The Plan of Care was developed with the following considerations:   Patient / Family readiness to learn indicated by:verbalized understanding  Persons(s) to be included in education: patient and family  Barriers to Learning/Limitations:No    Signed     Leslie Cisneros RN    4/18/2021   1:37 PM no

## 2021-04-18 NOTE — ED PROVIDER NOTES
EMERGENCY DEPARTMENT HISTORY AND PHYSICAL EXAM      Date: 4/18/2021  Patient Name: Hiral Causey    History of Presenting Illness     Chief Complaint   Patient presents with    Fall     head injury     History Provided By: Patient and Patient's Daughter    HPI: Hiral Causey, 80 y.o. female with past medical history significant for atrial fibrillation, CAD, diabetes, hypertension, and HIV who presents via private vehicle accompanied by her daughter to the ED with cc of fall with left-sided head injury and mild headache that occurred around midnight last night. Patient states that she has increased lower extremity swelling as well as abdominal edema/anasarca and has been slightly more off balance recently. She denies any palpitations, lightheadedness, or dizziness prior to the fall. Patient and her daughter both state that it was a mechanical fall without any loss of consciousness. They placed ice on the head and observed her for 3 hours last night. When the swelling had not gone down and she was still complaining of headache this morning, they decided to bring her in for evaluation. Patient takes a baby aspirin as well as Plavix daily. She denies any other injuries. Her headache is located on the left side of the head and does not radiate. PMHx: Atrial fibrillation, CAD, diabetes, hypertension, HIV  Social Hx: Denies alcohol, tobacco, or illegal drug use    PCP: Louisa Guerrier MD    There are no other complaints, changes, or physical findings at this time. No current facility-administered medications on file prior to encounter. Current Outpatient Medications on File Prior to Encounter   Medication Sig Dispense Refill    clopidogrel (PLAVIX) 75 mg tab take 1 tablet by mouth once daily  0    nitrofurantoin (MACRODANTIN) 100 mg capsule Take 100 mg by mouth nightly.       triamcinolone acetonide (KENALOG) 0.1 % topical cream Apply  to affected area two (2) times daily as needed for Skin Irritation. use thin layer      cholecalciferol (VITAMIN D3) 1,000 unit cap daily. 1    losartan (COZAAR) 100 mg tablet Take 100 mg by mouth daily.  isosorbide mononitrate ER (IMDUR) 60 mg CR tablet Take 60 mg by mouth every morning. NEW RX on 3/2/18  not yet started.  liver oil-zinc oxide ointment Apply  to affected area as needed for Skin Irritation.  aspirin delayed-release 81 mg tablet Take 81 mg by mouth daily.  atorvastatin (LIPITOR) 40 mg tablet Take 40 mg by mouth daily.  hydroCHLOROthiazide (HYDRODIURIL) 25 mg tablet Take 25 mg by mouth daily.  metFORMIN (GLUMETZA ER) 500 mg TG24 24 hour tablet Take 500 mg by mouth two (2) times a day.  metoprolol succinate (TOPROL-XL) 100 mg tablet Take 100 mg by mouth daily.  elvitegravir-cobicistat-emtricitabine-tenofovir alafenamide (GENVOYA) tab tablet Take 1 Tab by mouth daily (with breakfast). 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     Past Surgical History:  Past Surgical History:   Procedure Laterality Date    HX CHOLECYSTECTOMY      HX HEENT      cataract    HX HYSTERECTOMY      HX MYOMECTOMY      HX ORTHOPAEDIC      [place] CARDIAC SURG PROCEDURE UNLIST  1995    Stent Placement     Family History:  Family History   Problem Relation Age of Onset    Diabetes Other     Hypertension Other     Stroke Daughter     Other Daughter         sarcoidosis    Other Grandchild         sarcoidosis     Social History:  Social History     Tobacco Use    Smoking status: Never Smoker    Smokeless tobacco: Never Used   Substance Use Topics    Alcohol use: No    Drug use: No     Allergies:   Allergies   Allergen Reactions    Coumadin [Warfarin] Hives    Penicillanic Sulfone Bl Beta-Lactamase Inhibitors Rash Review of Systems   Review of Systems   Constitutional: Negative for chills and fever. HENT: Negative for congestion, rhinorrhea, sneezing and sore throat. Eyes: Negative for redness and visual disturbance. Respiratory: Negative for shortness of breath. Cardiovascular: Positive for leg swelling. Gastrointestinal: Negative for abdominal pain, nausea and vomiting. Genitourinary: Negative for difficulty urinating and frequency. Musculoskeletal: Negative for back pain, myalgias and neck stiffness. Skin: Positive for wound. Negative for rash. Neurological: Positive for headaches. Negative for dizziness, syncope and weakness. Hematological: Negative for adenopathy. All other systems reviewed and are negative. Physical Exam   Physical Exam  Vitals signs and nursing note reviewed. Constitutional:       Appearance: Normal appearance. She is well-developed. HENT:      Head: Normocephalic. Contusion present. Eyes:      Conjunctiva/sclera: Conjunctivae normal.   Neck:      Musculoskeletal: Full passive range of motion without pain, normal range of motion and neck supple. Cardiovascular:      Rate and Rhythm: Normal rate and regular rhythm. Pulses: Normal pulses. Heart sounds: S1 normal and S2 normal. Murmur present. Pulmonary:      Effort: Pulmonary effort is normal. No respiratory distress. Breath sounds: Normal breath sounds. No wheezing. Abdominal:      General: Bowel sounds are normal. There is no distension. Palpations: Abdomen is soft. Tenderness: There is no abdominal tenderness. There is no rebound. Musculoskeletal: Normal range of motion. Right lower le+ Pitting Edema present. Left lower le+ Pitting Edema present. Skin:     General: Skin is warm and dry. Findings: No rash. Neurological:      Mental Status: She is alert and oriented to person, place, and time.    Psychiatric:         Speech: Speech normal.         Behavior: Behavior normal.         Thought Content: Thought content normal.         Judgment: Judgment normal.       Diagnostic Study Results   Labs -   No results found for this or any previous visit (from the past 12 hour(s)). Radiologic Studies -   CT HEAD WO CONT   Final Result   Scalp soft tissue swelling. No acute intracranial findings. Ct Head Wo Cont    Result Date: 4/18/2021  Scalp soft tissue swelling. No acute intracranial findings. Medical Decision Making   I am the first provider for this patient. I reviewed the vital signs, available nursing notes, past medical history, past surgical history, family history and social history. Vital Signs-Reviewed the patient's vital signs. Patient Vitals for the past 24 hrs:   Pulse Resp BP SpO2   04/18/21 1315 66 16 136/61 96 %     Pulse Oximetry Analysis - 96% on RA (normal)    Records Reviewed: Nursing Notes and Old Medical Records    Provider Notes (Medical Decision Making):   77-year-old female presents with left frontal headache and hematoma after a mechanical fall last night around midnight. She is on aspirin and Plavix which increase her risk for intracranial hemorrhage. She complains of a mild headache but denies any other strokelike symptoms or neurologic deficits. Will CT her head to rule out intracranial hemorrhage and reassess. ED Course:   Initial assessment performed. The patients presenting problems have been discussed, and they are in agreement with the care plan formulated and outlined with them. I have encouraged them to ask questions as they arise throughout their visit. Progress Note  2:10 PM  I have re-evaluated pt and her CT is negative for intracranial hemorrhage or fracture. It does show a scalp hematoma consistent with her physical exam.  Will discharge with conservative management including ice and fall risks and have her follow-up with outpatient primary care as needed.     Progress Note:   Updated pt on all returned results and findings. Discussed the importance of proper follow up as referred below along with return precautions. Pt in agreement with the care plan and expresses agreement with and understanding of all items discussed. Disposition:  Discharge Note:  The pt is ready for discharge. The pt's signs, symptoms, diagnosis, and discharge instructions have been discussed and pt has conveyed their understanding. The pt is to follow up as recommended or return to ER should their symptoms worsen. Plan has been discussed and pt is in agreement. PLAN:  1. Current Discharge Medication List        2. Follow-up Information     Follow up With Specialties Details Why Evan Marie MD Internal Medicine Call in 1 day  [facility] [address]  [phone]      [facility] EMERGENCY DEPT Emergency Medicine  As needed, If symptoms worsen Baltazar Rodríguez  [phone]        Return to ED if worse     Diagnosis     Clinical Impression:   1. Fall, initial encounter    2. Traumatic hematoma of head, initial encounter    3. Acute post-traumatic headache, not intractable            Please note that this dictation was completed with Dragon, computer voice recognition software. Quite often unanticipated grammatical, syntax, homophones, and other interpretive errors are inadvertently transcribed by the computer software. Please disregard these errors. Additionally, please excuse any errors that have escaped final proofreading.

## 2021-04-23 ENCOUNTER — APPOINTMENT (OUTPATIENT)
Dept: CT IMAGING | Age: 86
End: 2021-04-23
Attending: NURSE PRACTITIONER
Payer: MEDICARE

## 2021-04-23 ENCOUNTER — HOSPITAL ENCOUNTER (EMERGENCY)
Age: 86
Discharge: HOME | End: 2021-04-23
Attending: EMERGENCY MEDICINE
Payer: MEDICARE

## 2021-04-23 VITALS
BODY MASS INDEX: 42.89 KG/M2 | RESPIRATION RATE: 18 BRPM | WEIGHT: 227 LBS | TEMPERATURE: 97.8 F | SYSTOLIC BLOOD PRESSURE: 141 MMHG | HEART RATE: 84 BPM | DIASTOLIC BLOOD PRESSURE: 61 MMHG | OXYGEN SATURATION: 98 %

## 2021-04-23 DIAGNOSIS — R10.13 DYSPEPSIA: Primary | ICD-10-CM

## 2021-04-23 DIAGNOSIS — N39.0 URINARY TRACT INFECTION WITHOUT HEMATURIA, SITE UNSPECIFIED: ICD-10-CM

## 2021-04-23 LAB
ALBUMIN SERPL-MCNC: 3.7 G/DL (ref 3.5–5)
ALBUMIN/GLOB SERPL: 0.9 {RATIO} (ref 1.1–2.2)
ALP SERPL-CCNC: 96 U/L (ref 45–117)
ALT SERPL-CCNC: 26 U/L (ref 12–78)
ANION GAP SERPL CALC-SCNC: 12 MMOL/L (ref 5–15)
APPEARANCE UR: CLEAR
AST SERPL-CCNC: 25 U/L (ref 15–37)
BACTERIA URNS QL MICRO: ABNORMAL /HPF
BASOPHILS # BLD: 0 K/UL (ref 0–0.1)
BASOPHILS NFR BLD: 1 % (ref 0–1)
BILIRUB SERPL-MCNC: 1.1 MG/DL (ref 0.2–1)
BILIRUB UR QL: NEGATIVE
BUN SERPL-MCNC: 28 MG/DL (ref 6–20)
BUN/CREAT SERPL: 11 (ref 12–20)
CALCIUM SERPL-MCNC: 9.1 MG/DL (ref 8.5–10.1)
CHLORIDE SERPL-SCNC: 103 MMOL/L (ref 97–108)
CO2 SERPL-SCNC: 25 MMOL/L (ref 21–32)
COLOR UR: ABNORMAL
CREAT SERPL-MCNC: 2.44 MG/DL (ref 0.55–1.02)
DIFFERENTIAL METHOD BLD: ABNORMAL
EOSINOPHIL # BLD: 0 K/UL (ref 0–0.4)
EOSINOPHIL NFR BLD: 1 % (ref 0–7)
EPITH CASTS URNS QL MICRO: ABNORMAL /LPF
ERYTHROCYTE [DISTWIDTH] IN BLOOD BY AUTOMATED COUNT: 17.4 % (ref 11.5–14.5)
GLOBULIN SER CALC-MCNC: 4.1 G/DL (ref 2–4)
GLUCOSE SERPL-MCNC: 135 MG/DL (ref 65–100)
GLUCOSE UR STRIP.AUTO-MCNC: NEGATIVE MG/DL
HCT VFR BLD AUTO: 28.8 % (ref 35–47)
HGB BLD-MCNC: 8.9 G/DL (ref 11.5–16)
HGB UR QL STRIP: NEGATIVE
IMM GRANULOCYTES # BLD AUTO: 0 K/UL (ref 0–0.04)
IMM GRANULOCYTES NFR BLD AUTO: 0 % (ref 0–0.5)
KETONES UR QL STRIP.AUTO: NEGATIVE MG/DL
LEUKOCYTE ESTERASE UR QL STRIP.AUTO: ABNORMAL
LIPASE SERPL-CCNC: 142 U/L (ref 73–393)
LYMPHOCYTES # BLD: 1 K/UL (ref 0.8–3.5)
LYMPHOCYTES NFR BLD: 25 % (ref 12–49)
MCH RBC QN AUTO: 27.1 PG (ref 26–34)
MCHC RBC AUTO-ENTMCNC: 30.9 G/DL (ref 30–36.5)
MCV RBC AUTO: 87.8 FL (ref 80–99)
MONOCYTES # BLD: 0.5 K/UL (ref 0–1)
MONOCYTES NFR BLD: 11 % (ref 5–13)
NEUTS SEG # BLD: 2.6 K/UL (ref 1.8–8)
NEUTS SEG NFR BLD: 62 % (ref 32–75)
NITRITE UR QL STRIP.AUTO: NEGATIVE
NRBC # BLD: 0 K/UL (ref 0–0.01)
NRBC BLD-RTO: 0 PER 100 WBC
PH UR STRIP: 6 [PH] (ref 5–8)
PLATELET # BLD AUTO: 155 K/UL (ref 150–400)
PMV BLD AUTO: 10.5 FL (ref 8.9–12.9)
POTASSIUM SERPL-SCNC: 4.4 MMOL/L (ref 3.5–5.1)
PROT SERPL-MCNC: 7.8 G/DL (ref 6.4–8.2)
PROT UR STRIP-MCNC: ABNORMAL MG/DL
RBC # BLD AUTO: 3.28 M/UL (ref 3.8–5.2)
RBC #/AREA URNS HPF: ABNORMAL /HPF (ref 0–5)
SODIUM SERPL-SCNC: 140 MMOL/L (ref 136–145)
SP GR UR REFRACTOMETRY: 1.01 (ref 1–1.03)
UA: UC IF INDICATED,UAUC: ABNORMAL
UROBILINOGEN UR QL STRIP.AUTO: 1 EU/DL (ref 0.2–1)
WBC # BLD AUTO: 4.1 K/UL (ref 3.6–11)
WBC URNS QL MICRO: ABNORMAL /HPF (ref 0–4)

## 2021-04-23 PROCEDURE — 99284 EMERGENCY DEPT VISIT MOD MDM: CPT

## 2021-04-23 PROCEDURE — 80053 COMPREHEN METABOLIC PANEL: CPT

## 2021-04-23 PROCEDURE — 81001 URINALYSIS AUTO W/SCOPE: CPT

## 2021-04-23 PROCEDURE — 83690 ASSAY OF LIPASE: CPT

## 2021-04-23 PROCEDURE — 87086 URINE CULTURE/COLONY COUNT: CPT

## 2021-04-23 PROCEDURE — 85025 COMPLETE CBC W/AUTO DIFF WBC: CPT

## 2021-04-23 PROCEDURE — 74176 CT ABD & PELVIS W/O CONTRAST: CPT

## 2021-04-23 PROCEDURE — 36415 COLL VENOUS BLD VENIPUNCTURE: CPT

## 2021-04-23 RX ORDER — SODIUM CHLORIDE 0.9 % (FLUSH) 0.9 %
5-40 SYRINGE (ML) INJECTION EVERY 8 HOURS
Status: DISCONTINUED | OUTPATIENT
Start: 2021-04-23 | End: 2021-04-23 | Stop reason: HOSPADM

## 2021-04-23 RX ORDER — SODIUM CHLORIDE 0.9 % (FLUSH) 0.9 %
5-40 SYRINGE (ML) INJECTION AS NEEDED
Status: DISCONTINUED | OUTPATIENT
Start: 2021-04-23 | End: 2021-04-23 | Stop reason: HOSPADM

## 2021-04-23 RX ORDER — SULFAMETHOXAZOLE AND TRIMETHOPRIM 800; 160 MG/1; MG/1
0.5 TABLET ORAL 2 TIMES DAILY
Qty: 3 TAB | Refills: 0 | Status: SHIPPED | OUTPATIENT
Start: 2021-04-23 | End: 2021-04-26

## 2021-04-23 NOTE — ED NOTES
Patient is alert and oriented x 4 and in no acute distress at this time. Respirations are at a regular rate, depth, and pattern. Patient updated on plan of care and has no questions or concerns at this time. Call bell within reach. Will continue to monitor. Please reference nursing assessment. Emergency Department Nursing Plan of Care       The Nursing Plan of Care is developed from the Nursing assessment and Emergency Department Attending provider initial evaluation. The plan of care may be reviewed in the ED Provider note.     The Plan of Care was developed with the following considerations:   Patient / Family readiness to learn indicated by:verbalized understanding and successful return demonstration  Persons(s) to be included in education: patient and family  Barriers to Learning/Limitations:No    Signed     James Saha RN    4/23/2021   10:15 AM

## 2021-04-23 NOTE — ED NOTES
Patient continues to rest comfortable. Patient in wheelchair and states that she is more comfortable in the wheelchair. Awaiting radiology results.  Will continue to monitor

## 2021-04-23 NOTE — ED PROVIDER NOTES
EMERGENCY DEPARTMENT HISTORY AND PHYSICAL EXAM    Date: 4/23/2021  Patient Name: Peter More    History of Presenting Illness     Chief Complaint   Patient presents with    Abdominal Pain         History Provided By: Patient    Chief Complaint: abdominal pain  Duration: onset last night   Timing:  Acute  Location: epigastric area  Quality: squeezing  Severity: Mild  Modifying Factors: none  Associated Symptoms: constipation      HPI: Peter More is a 80 y.o. female with a PMH of arthrits DM HTN and as below who presents with abdominal pain. stated pain started last night. Patient states she ate yesterday. States she had a small bowel movement today. denies n/v/d,    PCP: Lorena Ellison MD    Current Outpatient Medications   Medication Sig Dispense Refill    trimethoprim-sulfamethoxazole (Bactrim DS) 160-800 mg per tablet Take 0.5 Tabs by mouth two (2) times a day for 3 days. 3 Tab 0    nitrofurantoin (MACRODANTIN) 100 mg capsule Take 100 mg by mouth nightly.  triamcinolone acetonide (KENALOG) 0.1 % topical cream Apply  to affected area two (2) times daily as needed for Skin Irritation. use thin layer      cholecalciferol (VITAMIN D3) 1,000 unit cap daily. 1    clopidogrel (PLAVIX) 75 mg tab take 1 tablet by mouth once daily  0    losartan (COZAAR) 100 mg tablet Take 100 mg by mouth daily.  isosorbide mononitrate ER (IMDUR) 60 mg CR tablet Take 60 mg by mouth every morning. NEW RX on 3/2/18  not yet started.  liver oil-zinc oxide ointment Apply  to affected area as needed for Skin Irritation.  aspirin delayed-release 81 mg tablet Take 81 mg by mouth daily.  atorvastatin (LIPITOR) 40 mg tablet Take 40 mg by mouth daily.  hydroCHLOROthiazide (HYDRODIURIL) 25 mg tablet Take 25 mg by mouth daily.  metFORMIN (GLUMETZA ER) 500 mg TG24 24 hour tablet Take 500 mg by mouth two (2) times a day.  metoprolol succinate (TOPROL-XL) 100 mg tablet Take 100 mg by mouth daily.       elvitegravir-cobicistat-emtricitabine-tenofovir alafenamide (GENVOYA) tab tablet Take 1 Tab by mouth daily (with breakfast). 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       Past Surgical History:  Past Surgical History:   Procedure Laterality Date    HX CHOLECYSTECTOMY      HX HEENT      cataract    HX HYSTERECTOMY      HX MYOMECTOMY      HX ORTHOPAEDIC      CT CARDIAC SURG PROCEDURE UNLIST  1995    Stent Placement       Family History:  Family History   Problem Relation Age of Onset    Diabetes Other     Hypertension Other     Stroke Daughter     Other Daughter         sarcoidosis    Other Grandchild         sarcoidosis       Social History:  Social History     Tobacco Use    Smoking status: Never Smoker    Smokeless tobacco: Never Used   Substance Use Topics    Alcohol use: No    Drug use: No       Allergies: Allergies   Allergen Reactions    Coumadin [Warfarin] Hives    Penicillanic Sulfone Bl Beta-Lactamase Inhibitors Rash         Review of Systems   Review of Systems   Constitutional: Negative for fatigue and fever. Respiratory: Negative for shortness of breath and wheezing. Cardiovascular: Negative for chest pain. Gastrointestinal: Positive for abdominal pain and constipation. Musculoskeletal: Negative for arthralgias, myalgias, neck pain and neck stiffness. Skin: Negative for pallor and rash. Neurological: Negative for dizziness, tremors and headaches. All other systems reviewed and are negative. Physical Exam     Vitals:    04/23/21 0953 04/23/21 1153 04/23/21 1400   BP: (!) 141/61     Pulse: 86 77 84   Resp: 18     Temp: 97.8 °F (36.6 °C)     SpO2: 100% 100% 98%   Weight: 103 kg (227 lb)       Physical Exam  Vitals signs and nursing note reviewed. Constitutional:       General: She is not in acute distress. Appearance: She is well-developed. HENT:      Head: Normocephalic and atraumatic. Right Ear: External ear normal.      Left Ear: External ear normal.      Nose: Nose normal.   Eyes:      Conjunctiva/sclera: Conjunctivae normal.   Neck:      Musculoskeletal: Normal range of motion and neck supple. Cardiovascular:      Rate and Rhythm: Normal rate and regular rhythm. Heart sounds: Normal heart sounds. Pulmonary:      Effort: Pulmonary effort is normal. No respiratory distress. Breath sounds: Normal breath sounds. No wheezing. Abdominal:      General: Bowel sounds are normal. Distension: softly distended. Palpations: Abdomen is soft. Tenderness: There is abdominal tenderness in the epigastric area. Musculoskeletal: Normal range of motion. Lymphadenopathy:      Cervical: No cervical adenopathy. Skin:     General: Skin is warm and dry. Findings: No rash. Neurological:      Mental Status: She is alert and oriented to person, place, and time. Cranial Nerves: No cranial nerve deficit. Coordination: Coordination normal.   Psychiatric:         Behavior: Behavior normal.         Thought Content:  Thought content normal.         Judgment: Judgment normal.           Diagnostic Study Results     Labs -     Recent Results (from the past 12 hour(s))   CBC WITH AUTOMATED DIFF    Collection Time: 04/23/21 10:22 AM   Result Value Ref Range    WBC 4.1 3.6 - 11.0 K/uL    RBC 3.28 (L) 3.80 - 5.20 M/uL    HGB 8.9 (L) 11.5 - 16.0 g/dL    HCT 28.8 (L) 35.0 - 47.0 %    MCV 87.8 80.0 - 99.0 FL    MCH 27.1 26.0 - 34.0 PG    MCHC 30.9 30.0 - 36.5 g/dL    RDW 17.4 (H) 11.5 - 14.5 %    PLATELET 026 487 - 836 K/uL    MPV 10.5 8.9 - 12.9 FL    NRBC 0.0 0  WBC    ABSOLUTE NRBC 0.00 0.00 - 0.01 K/uL    NEUTROPHILS 62 32 - 75 %    LYMPHOCYTES 25 12 - 49 %    MONOCYTES 11 5 - 13 %    EOSINOPHILS 1 0 - 7 %    BASOPHILS 1 0 - 1 %    IMMATURE GRANULOCYTES 0 0.0 - 0.5 %    ABS. NEUTROPHILS 2.6 1.8 - 8.0 K/UL    ABS. LYMPHOCYTES 1.0 0.8 - 3.5 K/UL    ABS. MONOCYTES 0.5 0.0 - 1.0 K/UL    ABS. EOSINOPHILS 0.0 0.0 - 0.4 K/UL    ABS. BASOPHILS 0.0 0.0 - 0.1 K/UL    ABS. IMM. GRANS. 0.0 0.00 - 0.04 K/UL    DF AUTOMATED     METABOLIC PANEL, COMPREHENSIVE    Collection Time: 04/23/21 10:22 AM   Result Value Ref Range    Sodium 140 136 - 145 mmol/L    Potassium 4.4 3.5 - 5.1 mmol/L    Chloride 103 97 - 108 mmol/L    CO2 25 21 - 32 mmol/L    Anion gap 12 5 - 15 mmol/L    Glucose 135 (H) 65 - 100 mg/dL    BUN 28 (H) 6 - 20 MG/DL    Creatinine 2.44 (H) 0.55 - 1.02 MG/DL    BUN/Creatinine ratio 11 (L) 12 - 20      GFR est AA 23 (L) >60 ml/min/1.73m2    GFR est non-AA 19 (L) >60 ml/min/1.73m2    Calcium 9.1 8.5 - 10.1 MG/DL    Bilirubin, total 1.1 (H) 0.2 - 1.0 MG/DL    ALT (SGPT) 26 12 - 78 U/L    AST (SGOT) 25 15 - 37 U/L    Alk. phosphatase 96 45 - 117 U/L    Protein, total 7.8 6.4 - 8.2 g/dL    Albumin 3.7 3.5 - 5.0 g/dL    Globulin 4.1 (H) 2.0 - 4.0 g/dL    A-G Ratio 0.9 (L) 1.1 - 2.2     LIPASE    Collection Time: 04/23/21 10:22 AM   Result Value Ref Range    Lipase 142 73 - 393 U/L   URINALYSIS W/ REFLEX CULTURE    Collection Time: 04/23/21 11:29 AM    Specimen: Urine   Result Value Ref Range    Color YELLOW/STRAW      Appearance CLEAR CLEAR      Specific gravity 1.010 1.003 - 1.030      pH (UA) 6.0 5.0 - 8.0      Protein TRACE (A) NEG mg/dL    Glucose Negative NEG mg/dL    Ketone Negative NEG mg/dL    Bilirubin Negative NEG      Blood Negative NEG      Urobilinogen 1.0 0.2 - 1.0 EU/dL    Nitrites Negative NEG      Leukocyte Esterase TRACE (A) NEG      WBC 10-20 0 - 4 /hpf    RBC 0-5 0 - 5 /hpf    Epithelial cells FEW FEW /lpf    Bacteria 2+ (A) NEG /hpf    UA:UC IF INDICATED URINE CULTURE ORDERED (A) CNI         Radiologic Studies -   CT ABD PELV WO CONT   Final Result      1.  Diffuse body wall edema and small volume ascites compatible with third spacing of fluid. 2. Otherwise no acute abnormality        CT Results  (Last 48 hours)               04/23/21 1308  CT ABD PELV WO CONT Final result    Impression:      1. Diffuse body wall edema and small volume ascites compatible with third   spacing of fluid. 2. Otherwise no acute abnormality       Narrative:  EXAM: CT ABD PELV WO CONT       INDICATION: generalized pain       COMPARISON: 8/12/2014       CONTRAST:  None. TECHNIQUE:    Thin axial images were obtained through the abdomen and pelvis. Coronal and   sagittal reformats were generated. Oral contrast was not administered. CT dose   reduction was achieved through use of a standardized protocol tailored for this   examination and automatic exposure control for dose modulation. The absence of intravenous contrast material reduces the sensitivity for   evaluation of the vasculature and solid organs. FINDINGS:    LOWER THORAX: No consolidation. Heart is borderline enlarged   LIVER: No mass. BILIARY TREE: Surgically absent CBD is not dilated. SPLEEN: within normal limits. PANCREAS: No focal abnormality. ADRENALS: Unremarkable. KIDNEYS/URETERS: Atrophy without hydronephrosis. Multiple small round lesions   right kidney are indeterminate. Small nonobstructing right renal stone   STOMACH: Unremarkable. SMALL BOWEL: No dilatation or wall thickening. COLON: No dilatation or wall thickening. APPENDIX: Not identified   PERITONEUM: No free air. Small volume of ascites   RETROPERITONEUM: Vascular calcifications without aneurysm. No pathologic   adenopathy   REPRODUCTIVE ORGANS: Surgically absent   URINARY BLADDER: No mass or calculus. BONES: Stent degenerative changes. ABDOMINAL WALL: Diffuse body wall edema   ADDITIONAL COMMENTS: N/A               CXR Results  (Last 48 hours)    None            Medical Decision Making   I am the first provider for this patient.     I reviewed the vital signs, available nursing notes, past medical history, past surgical history, family history and social history. Vital Signs-Reviewed the patient's vital signs. Records Reviewed: Nursing Notes    Provider Notes (Medical Decision Making):   80year old female presents with acute onset of abdominal pain. will order labs urinalysis CT GERD v constipation dyspesia UTI           Disposition:  HOme    DISCHARGE NOTE:   home      Care plan outlined and precautions discussed. Patient has no new complaints, changes, or physical findings. Results of tests were reviewed with the patient. All medications were reviewed with the patient; will d/c home with pepcid(daughter stated she will purchase OTC and bactrim. All of pt's questions and concerns were addressed. Patient was instructed and agrees to follow up with PCP, as well as to return to the ED upon further deterioration. Patient is ready to go home. Follow-up Information     Follow up With Specialties Details Why Nathan Montalvo MD Internal Medicine In 1 week  [facility] [id number]  [phone]            Discharge Medication List as of 4/23/2021  2:00 PM      START taking these medications    Details   trimethoprim-sulfamethoxazole (Bactrim DS) 160-800 mg per tablet Take 0.5 Tabs by mouth two (2) times a day for 3 days. , Normal, Disp-3 Tab, R-0         CONTINUE these medications which have NOT CHANGED    Details   nitrofurantoin (MACRODANTIN) 100 mg capsule Take 100 mg by mouth nightly., Historical Med      triamcinolone acetonide (KENALOG) 0.1 % topical cream Apply  to affected area two (2) times daily as needed for Skin Irritation. use thin layer, Historical Med      cholecalciferol (VITAMIN D3) 1,000 unit cap daily. , Historical Med, R-1      clopidogrel (PLAVIX) 75 mg tab take 1 tablet by mouth once daily, Historical Med, R-0      losartan (COZAAR) 100 mg tablet Take 100 mg by mouth daily. , Historical Med      isosorbide mononitrate ER (IMDUR) 60 mg CR tablet Take 60 mg by mouth every morning. NEW RX on 3/2/18  not yet started., Historical Med      liver oil-zinc oxide ointment Apply  to affected area as needed for Skin Irritation. , Historical Med      aspirin delayed-release 81 mg tablet Take 81 mg by mouth daily. , Historical Med      atorvastatin (LIPITOR) 40 mg tablet Take 40 mg by mouth daily. , Historical Med      hydroCHLOROthiazide (HYDRODIURIL) 25 mg tablet Take 25 mg by mouth daily. , Historical Med      metFORMIN (GLUMETZA ER) 500 mg TG24 24 hour tablet Take 500 mg by mouth two (2) times a day., Historical Med      metoprolol succinate (TOPROL-XL) 100 mg tablet Take 100 mg by mouth daily. , Historical Med      elvitegravir-cobicistat-emtricitabine-tenofovir alafenamide (GENVOYA) tab tablet Take 1 Tab by mouth daily (with breakfast). , Historical Med             Procedures:  Procedures    Please note that this dictation was completed with Dragon, computer voice recognition software. Quite often unanticipated grammatical, syntax, homophones, and other interpretive errors are inadvertently transcribed by the computer software. Please disregard these errors. Additionally, please excuse any errors that have escaped final proofreading. Diagnosis     Clinical Impression:   1. Dyspepsia    2.  Urinary tract infection without hematuria, site unspecified

## 2021-04-25 LAB
BACTERIA SPEC CULT: NORMAL
CC UR VC: NORMAL
SERVICE CMNT-IMP: NORMAL

## 2021-07-12 ENCOUNTER — APPOINTMENT (OUTPATIENT)
Dept: GENERAL RADIOLOGY | Age: 86
End: 2021-07-12
Attending: STUDENT IN AN ORGANIZED HEALTH CARE EDUCATION/TRAINING PROGRAM
Payer: MEDICARE

## 2021-07-12 ENCOUNTER — HOSPITAL ENCOUNTER (EMERGENCY)
Age: 86
Discharge: HOME | End: 2021-07-12
Attending: STUDENT IN AN ORGANIZED HEALTH CARE EDUCATION/TRAINING PROGRAM
Payer: MEDICARE

## 2021-07-12 VITALS
HEIGHT: 61 IN | HEART RATE: 57 BPM | RESPIRATION RATE: 18 BRPM | WEIGHT: 211 LBS | SYSTOLIC BLOOD PRESSURE: 144 MMHG | TEMPERATURE: 98.1 F | OXYGEN SATURATION: 97 % | DIASTOLIC BLOOD PRESSURE: 56 MMHG | BODY MASS INDEX: 39.84 KG/M2

## 2021-07-12 DIAGNOSIS — N39.0 URINARY TRACT INFECTION WITHOUT HEMATURIA, SITE UNSPECIFIED: Primary | ICD-10-CM

## 2021-07-12 DIAGNOSIS — R10.84 ABDOMINAL PAIN, GENERALIZED: ICD-10-CM

## 2021-07-12 LAB
ALBUMIN SERPL-MCNC: 3.6 G/DL (ref 3.5–5)
ALBUMIN/GLOB SERPL: 0.9 {RATIO} (ref 1.1–2.2)
ALP SERPL-CCNC: 64 U/L (ref 45–117)
ALT SERPL-CCNC: 21 U/L (ref 12–78)
ANION GAP SERPL CALC-SCNC: 9 MMOL/L (ref 5–15)
APPEARANCE UR: ABNORMAL
AST SERPL-CCNC: 25 U/L (ref 15–37)
BACTERIA URNS QL MICRO: ABNORMAL /HPF
BASOPHILS # BLD: 0 K/UL (ref 0–0.1)
BASOPHILS NFR BLD: 1 % (ref 0–1)
BILIRUB SERPL-MCNC: 0.7 MG/DL (ref 0.2–1)
BILIRUB UR QL: NEGATIVE
BNP SERPL-MCNC: 3624 PG/ML (ref 0–450)
BUN SERPL-MCNC: 22 MG/DL (ref 6–20)
BUN/CREAT SERPL: 10 (ref 12–20)
CALCIUM SERPL-MCNC: 8.8 MG/DL (ref 8.5–10.1)
CHLORIDE SERPL-SCNC: 106 MMOL/L (ref 97–108)
CO2 SERPL-SCNC: 26 MMOL/L (ref 21–32)
COLOR UR: ABNORMAL
CREAT SERPL-MCNC: 2.14 MG/DL (ref 0.55–1.02)
DIFFERENTIAL METHOD BLD: ABNORMAL
EOSINOPHIL # BLD: 0.1 K/UL (ref 0–0.4)
EOSINOPHIL NFR BLD: 2 % (ref 0–7)
EPITH CASTS URNS QL MICRO: ABNORMAL /LPF
ERYTHROCYTE [DISTWIDTH] IN BLOOD BY AUTOMATED COUNT: 17.6 % (ref 11.5–14.5)
GLOBULIN SER CALC-MCNC: 3.8 G/DL (ref 2–4)
GLUCOSE SERPL-MCNC: 118 MG/DL (ref 65–100)
GLUCOSE UR STRIP.AUTO-MCNC: NEGATIVE MG/DL
HCT VFR BLD AUTO: 27.1 % (ref 35–47)
HGB BLD-MCNC: 8.6 G/DL (ref 11.5–16)
HGB UR QL STRIP: NEGATIVE
IMM GRANULOCYTES # BLD AUTO: 0 K/UL (ref 0–0.04)
IMM GRANULOCYTES NFR BLD AUTO: 0 % (ref 0–0.5)
KETONES UR QL STRIP.AUTO: NEGATIVE MG/DL
LEUKOCYTE ESTERASE UR QL STRIP.AUTO: ABNORMAL
LIPASE SERPL-CCNC: 110 U/L (ref 73–393)
LYMPHOCYTES # BLD: 1 K/UL (ref 0.8–3.5)
LYMPHOCYTES NFR BLD: 31 % (ref 12–49)
MCH RBC QN AUTO: 29.3 PG (ref 26–34)
MCHC RBC AUTO-ENTMCNC: 31.7 G/DL (ref 30–36.5)
MCV RBC AUTO: 92.2 FL (ref 80–99)
MONOCYTES # BLD: 0.5 K/UL (ref 0–1)
MONOCYTES NFR BLD: 15 % (ref 5–13)
NEUTS SEG # BLD: 1.8 K/UL (ref 1.8–8)
NEUTS SEG NFR BLD: 51 % (ref 32–75)
NITRITE UR QL STRIP.AUTO: NEGATIVE
NRBC # BLD: 0 K/UL (ref 0–0.01)
NRBC BLD-RTO: 0 PER 100 WBC
PH UR STRIP: 5.5 [PH] (ref 5–8)
PLATELET # BLD AUTO: 149 K/UL (ref 150–400)
PMV BLD AUTO: 11.2 FL (ref 8.9–12.9)
POTASSIUM SERPL-SCNC: 4.3 MMOL/L (ref 3.5–5.1)
PROT SERPL-MCNC: 7.4 G/DL (ref 6.4–8.2)
PROT UR STRIP-MCNC: ABNORMAL MG/DL
RBC # BLD AUTO: 2.94 M/UL (ref 3.8–5.2)
RBC #/AREA URNS HPF: ABNORMAL /HPF (ref 0–5)
SODIUM SERPL-SCNC: 141 MMOL/L (ref 136–145)
SP GR UR REFRACTOMETRY: 1.01 (ref 1–1.03)
UA: UC IF INDICATED,UAUC: ABNORMAL
UROBILINOGEN UR QL STRIP.AUTO: 0.2 EU/DL (ref 0.2–1)
WBC # BLD AUTO: 3.4 K/UL (ref 3.6–11)
WBC URNS QL MICRO: ABNORMAL /HPF (ref 0–4)

## 2021-07-12 PROCEDURE — 71045 X-RAY EXAM CHEST 1 VIEW: CPT

## 2021-07-12 PROCEDURE — 99284 EMERGENCY DEPT VISIT MOD MDM: CPT

## 2021-07-12 PROCEDURE — 87186 SC STD MICRODIL/AGAR DIL: CPT

## 2021-07-12 PROCEDURE — 81001 URINALYSIS AUTO W/SCOPE: CPT

## 2021-07-12 PROCEDURE — 93005 ELECTROCARDIOGRAM TRACING: CPT

## 2021-07-12 PROCEDURE — 83880 ASSAY OF NATRIURETIC PEPTIDE: CPT

## 2021-07-12 PROCEDURE — 36415 COLL VENOUS BLD VENIPUNCTURE: CPT

## 2021-07-12 PROCEDURE — 85025 COMPLETE CBC W/AUTO DIFF WBC: CPT

## 2021-07-12 PROCEDURE — 87077 CULTURE AEROBIC IDENTIFY: CPT

## 2021-07-12 PROCEDURE — 87086 URINE CULTURE/COLONY COUNT: CPT

## 2021-07-12 PROCEDURE — 80053 COMPREHEN METABOLIC PANEL: CPT

## 2021-07-12 PROCEDURE — 83690 ASSAY OF LIPASE: CPT

## 2021-07-12 RX ORDER — HYDRALAZINE HYDROCHLORIDE 50 MG/1
TABLET, FILM COATED ORAL
COMMUNITY
Start: 2020-07-27

## 2021-07-12 RX ORDER — AMIODARONE HYDROCHLORIDE 200 MG/1
200 TABLET ORAL
COMMUNITY

## 2021-07-12 RX ORDER — FUROSEMIDE 40 MG/1
TABLET ORAL
COMMUNITY
Start: 2020-11-03 | End: 2021-07-14

## 2021-07-12 RX ORDER — NYSTATIN 100000 [USP'U]/G
POWDER TOPICAL 4 TIMES DAILY
COMMUNITY

## 2021-07-12 RX ORDER — NITROFURANTOIN 25; 75 MG/1; MG/1
100 CAPSULE ORAL 2 TIMES DAILY
Qty: 10 CAPSULE | Refills: 0 | Status: SHIPPED | OUTPATIENT
Start: 2021-07-12 | End: 2021-07-17

## 2021-07-12 RX ORDER — SODIUM CHLORIDE 0.9 % (FLUSH) 0.9 %
10 SYRINGE (ML) INJECTION AS NEEDED
Status: DISCONTINUED | OUTPATIENT
Start: 2021-07-12 | End: 2021-07-12 | Stop reason: HOSPADM

## 2021-07-12 NOTE — ED NOTES
Pt arrives in ED with her daughter. Pt's daughter providing most of the history of present illness. Pt has intermittent abdominal swelling and pain, intermittent dependent edema. Pt's doctors are at [facility]. Pt was taking both Lasix 40 mg twice a day and Spironolactone, last month [facility] MDs discontinued the Spironolactone. Pt's daughter states, Yuri Mahajan it was drying out her kidneys. \" Pt has 3+ pitting edema to bilateral lower extremities. Pt's daughter told me separately that she sometimes crushes the patient's medications and puts them in her food because her mother is stubborn and does not want to take a lot of her medications.

## 2021-07-12 NOTE — ED PROVIDER NOTES
EMERGENCY DEPARTMENT HISTORY AND PHYSICAL EXAM      Date: 7/12/2021  Patient Name: Mavis Albert    History of Presenting Illness     Chief Complaint   Patient presents with    Abdominal Pain     Pt c/o abd pain/swelling x year. worst today     Leg Pain     Pt c/o bilateral leg swelling x year. worst today        History Provided By: Patient    HPI: Mavis Albert, 80 y.o. female with past medical history of CAD status post PCI, A. fib on Eliquis, type 2 diabetes, TIA, HIV, hypertension, CHF, seizure disorder, bilateral lower extremity chronic lymphedema, presents to the ED with cc of intermittent abdominal pain//swelling as well as bilateral lower extremity swelling. Per daughter, patient symptoms have been present for over a year. States that patient normally follows up with [facility]. Several weeks ago, patient was diagnosed with a UTI by her PCP at [facility], subsequently placed on fosfomycin. However, daughter is concerned that patient may not have been taking this correctly, and is worried that patient still has ongoing UTI. She is requesting that patient gets her urine checked today. Patient has not had any nausea, vomiting, diarrhea, constipation, fevers or chills. She denies any urinary complaints. Per daughter, lower extremity edema is stable compared to her normal baseline. States that patient is noncompliant with many of her home treatments as she is \"hard headed. \" This includes noncompliance with Lasix. Patient currently denies any shortness of breath. No chest pain. PCP: Chey Santoyo MD    No current facility-administered medications on file prior to encounter.      Current Outpatient Medications on File Prior to Encounter   Medication Sig Dispense Refill    apixaban (ELIQUIS) 2.5 mg tablet 2.5 mg = 1 tab each dose, PO, every 12 hours, # 180 tab, 3 Refills, Pharmacy: [facility] #[id number]      furosemide (LASIX) 40 mg tablet 80 mg = 2 tab each dose, PO, daily, # 60 tab, 1 Refills, Pharmacy: [facility] #[id number]      nitrofurantoin (MACRODANTIN) 100 mg capsule Take 100 mg by mouth nightly.  triamcinolone acetonide (KENALOG) 0.1 % topical cream Apply  to affected area two (2) times daily as needed for Skin Irritation. use thin layer      cholecalciferol (VITAMIN D3) 1,000 unit cap daily. 1    [DISCONTINUED] clopidogrel (PLAVIX) 75 mg tab take 1 tablet by mouth once daily  0    losartan (COZAAR) 100 mg tablet Take 100 mg by mouth daily.  isosorbide mononitrate ER (IMDUR) 60 mg CR tablet Take 60 mg by mouth every morning. NEW RX on 3/2/18  not yet started.  liver oil-zinc oxide ointment Apply  to affected area as needed for Skin Irritation.  aspirin delayed-release 81 mg tablet Take 81 mg by mouth daily.  atorvastatin (LIPITOR) 40 mg tablet Take 40 mg by mouth daily.  hydroCHLOROthiazide (HYDRODIURIL) 25 mg tablet Take 25 mg by mouth daily.  metFORMIN (GLUMETZA ER) 500 mg TG24 24 hour tablet Take 500 mg by mouth two (2) times a day.  metoprolol succinate (TOPROL-XL) 100 mg tablet Take 100 mg by mouth daily.  elvitegravir-cobicistat-emtricitabine-tenofovir alafenamide (GENVOYA) tab tablet Take 1 Tab by mouth daily (with breakfast).          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       Past Surgical History:  Past Surgical History:   Procedure Laterality Date    HX CHOLECYSTECTOMY      HX HEENT      cataract    HX HYSTERECTOMY      HX MYOMECTOMY      HX ORTHOPAEDIC      MS CARDIAC SURG PROCEDURE UNLIST  1995    Stent Placement       Family History:  Family History   Problem Relation Age of Onset    Diabetes Other     Hypertension Other     Stroke Daughter     Other Daughter         sarcoidosis    Other Grandchild sarcoidosis       Social History:  Social History     Tobacco Use    Smoking status: Never Smoker    Smokeless tobacco: Never Used   Substance Use Topics    Alcohol use: No    Drug use: No       Allergies: Allergies   Allergen Reactions    Coumadin [Warfarin] Hives    Penicillanic Sulfone Bl Beta-Lactamase Inhibitors Rash         Review of Systems   Review of Systems   Constitutional: Negative for chills and fever. HENT: Negative for congestion and rhinorrhea. Eyes: Negative for visual disturbance. Respiratory: Negative for chest tightness and shortness of breath. Cardiovascular: Positive for leg swelling. Negative for chest pain and palpitations. Gastrointestinal: Positive for abdominal pain. Negative for diarrhea, nausea and vomiting. Genitourinary: Negative for dysuria, flank pain and hematuria. Musculoskeletal: Negative for back pain and neck pain. Skin: Negative for rash. Allergic/Immunologic: Negative for immunocompromised state. Neurological: Negative for dizziness, speech difficulty, weakness and headaches. Hematological: Negative for adenopathy. Psychiatric/Behavioral: Negative for dysphoric mood and suicidal ideas. Physical Exam   Physical Exam  Vitals and nursing note reviewed. Constitutional:       General: She is not in acute distress. Appearance: She is not ill-appearing or toxic-appearing. HENT:      Head: Normocephalic and atraumatic. Nose: Nose normal.      Mouth/Throat:      Mouth: Mucous membranes are moist.   Eyes:      Extraocular Movements: Extraocular movements intact. Pupils: Pupils are equal, round, and reactive to light. Cardiovascular:      Rate and Rhythm: Normal rate and regular rhythm. Pulses: Normal pulses. Pulmonary:      Effort: Pulmonary effort is normal.      Breath sounds: No stridor. No wheezing or rhonchi. Abdominal:      General: Abdomen is flat. Bowel sounds are normal. There is no distension.       Palpations: Abdomen is soft. Tenderness: There is no abdominal tenderness. Musculoskeletal:         General: Normal range of motion. Cervical back: Normal range of motion and neck supple. Right lower le+ Pitting Edema present. Left lower le+ Pitting Edema present. Skin:     General: Skin is warm and dry. Neurological:      General: No focal deficit present. Mental Status: She is alert and oriented to person, place, and time. Psychiatric:         Judgment: Judgment normal.         Diagnostic Study Results     Labs -     Recent Results (from the past 24 hour(s))   CBC WITH AUTOMATED DIFF    Collection Time: 21  6:15 PM   Result Value Ref Range    WBC 3.4 (L) 3.6 - 11.0 K/uL    RBC 2.94 (L) 3.80 - 5.20 M/uL    HGB 8.6 (L) 11.5 - 16.0 g/dL    HCT 27.1 (L) 35.0 - 47.0 %    MCV 92.2 80.0 - 99.0 FL    MCH 29.3 26.0 - 34.0 PG    MCHC 31.7 30.0 - 36.5 g/dL    RDW 17.6 (H) 11.5 - 14.5 %    PLATELET 977 (L) 884 - 400 K/uL    MPV 11.2 8.9 - 12.9 FL    NRBC 0.0 0  WBC    ABSOLUTE NRBC 0.00 0.00 - 0.01 K/uL    NEUTROPHILS 51 32 - 75 %    LYMPHOCYTES 31 12 - 49 %    MONOCYTES 15 (H) 5 - 13 %    EOSINOPHILS 2 0 - 7 %    BASOPHILS 1 0 - 1 %    IMMATURE GRANULOCYTES 0 0.0 - 0.5 %    ABS. NEUTROPHILS 1.8 1.8 - 8.0 K/UL    ABS. LYMPHOCYTES 1.0 0.8 - 3.5 K/UL    ABS. MONOCYTES 0.5 0.0 - 1.0 K/UL    ABS. EOSINOPHILS 0.1 0.0 - 0.4 K/UL    ABS. BASOPHILS 0.0 0.0 - 0.1 K/UL    ABS. IMM.  GRANS. 0.0 0.00 - 0.04 K/UL    DF AUTOMATED     EKG, 12 LEAD, INITIAL    Collection Time: 21  6:29 PM   Result Value Ref Range    Ventricular Rate 59 BPM    Atrial Rate 59 BPM    P-R Interval 190 ms    QRS Duration 98 ms    Q-T Interval 468 ms    QTC Calculation (Bezet) 463 ms    Calculated P Axis 30 degrees    Calculated R Axis -49 degrees    Calculated T Axis 95 degrees    Diagnosis       Sinus bradycardia  Low voltage QRS  Left anterior fascicular block  Nonspecific T wave abnormality  Abnormal ECG  When compared with ECG of 06-APR-2021 20:06,  AL interval has decreased  T wave inversion no longer evident in Anterior leads         Radiologic Studies -   XR CHEST PORT   Final Result   No acute process. CT Results  (Last 48 hours)    None        CXR Results  (Last 48 hours)               07/12/21 1800  XR CHEST PORT Final result    Impression:  No acute process. Narrative: Indication: Abdominal swelling and pain, dependent edema       Comparison: 4/6/2021       Portable exam of the chest obtained at 1751 demonstrates stable cardiomegaly. There is no acute process in the lung fields. The osseous structures are   unremarkable. Medical Decision Making   I am the first provider for this patient. I reviewed the vital signs, available nursing notes, past medical history, past surgical history, family history and social history. Vital Signs-Reviewed the patient's vital signs. Patient Vitals for the past 24 hrs:   Temp Pulse Resp BP SpO2   07/12/21 1723 98.1 °F (36.7 °C) (!) 54 18 (!) 157/58 99 %       EKG interpretation: (Preliminary)  Sinus bradycardia, 59 bpm, nonspecific ST changes, actually improved in certain leads when compared to EKG from April 6, 2021. EKG interpretation by Korin Reyes MD    Records Reviewed: Nursing Notes, Old Medical Records and Previous electrocardiograms    Provider Notes (Medical Decision Making):   Vitals are stable. On exam, patient is well-appearing, in no acute distress. Differential diagnosis considered for abdominal pain: Acute abdomen (clinically low suspicion for this), IBD, IBS, dyspepsia, gastritis, PUD, intestinal spasms, constipation, UTI etc. Will check labs and UA. Patient has no reproducible abdominal pain on my exam, no indication for imaging unless laboratory studies are suspicious for acute pathology.  Lower extremity edema likely chronic in nature, as daughter reports no worsening compared to her usual. She does have a known history of chronic lymphedema. However, in light of intermittent noncompliance with home treatments, will check EKG, BNP, as well as chest x-ray to assess for pulmonary edema. ED Course as of Jul 12 2009 Mon Jul 12, 2021 1949 Work-up results reviewed-mild neutropenia, nonspecific. Baseline anemia, stable. BNP is elevated at 3624. Although it appears patient's BMP was even more elevated at PCP's office on recent visit in the 4000 based on labs shown by patient's daughter at bedside. Her chest x-ray is without evidence of pulmonary edema. She does not appear to be in overt CHF exacerbation. Renal function is at her baseline. UA suspicious for possible infection with moderate leukocyte esterase, slightly elevated WBCs, 3+ bacteria. Although sample appears to be somewhat contaminated with moderate epithelial cells. However, as patient is prone to UTIs, will place her on antibiotics to treat for possible infection. Will send urine culture. Patient's daughter reports she is allergic to beta lactams, and that she has previously been responsive to macrobid, and is requesting that patient be placed on this today as well. Will send for 5 days of macrobid. She has a follow up appointment scheduled with her PCP already set up for a week's time. They feel comfortable with plan for discharge. Return precautions discussed. [JM]      ED Course User Index  [JM] MD Keith August MD      Disposition:  Discharge      DISCHARGE PLAN:  1. Current Discharge Medication List      START taking these medications    Details   nitrofurantoin, macrocrystal-monohydrate, (Macrobid) 100 mg capsule Take 1 Capsule by mouth two (2) times a day for 5 days. Qty: 10 Capsule, Refills: 0  Start date: 7/12/2021, End date: 7/17/2021           2.    Follow-up Information     Follow up With Specialties Details Why Dee Acosta MD Internal Medicine In 1 week  [facility] [address]  [phone]          3. Return to ED if worse     Diagnosis     Clinical Impression:   1. Urinary tract infection without hematuria, site unspecified    2. Abdominal pain, generalized        Attestations:    Omaira Rausch MD    Please note that this dictation was completed with [employer], the computer voice recognition software. Quite often unanticipated grammatical, syntax, homophones, and other interpretive errors are inadvertently transcribed by the computer software. Please disregard these errors. Please excuse any errors that have escaped final proofreading. Thank you.

## 2021-07-12 NOTE — ED NOTES
Verbal shift change report given to Jimena KENDRICK (oncoming nurse) by Bigg Will (offgoing nurse). Report included the following information SBAR, ED Summary, MAR and Med Rec Status.

## 2021-07-13 LAB
ATRIAL RATE: 59 BPM
CALCULATED P AXIS, ECG09: 30 DEGREES
CALCULATED R AXIS, ECG10: -49 DEGREES
CALCULATED T AXIS, ECG11: 95 DEGREES
DIAGNOSIS, 93000: NORMAL
P-R INTERVAL, ECG05: 190 MS
Q-T INTERVAL, ECG07: 468 MS
QRS DURATION, ECG06: 98 MS
QTC CALCULATION (BEZET), ECG08: 463 MS
VENTRICULAR RATE, ECG03: 59 BPM

## 2021-07-13 NOTE — ED NOTES
Discharge instructions were given to the patient by Julian Hernández RN  . The patient left the Emergency Department with [facility], alert and oriented and in no acute distress with 1 prescriptions. The patient was encouraged to call or return to the ED for worsening issues or problems and was encouraged to schedule a follow up appointment for continuing care. The patient verbalized understanding of discharge instructions and prescriptions, all questions were answered. The patient has no further concerns at this time.

## 2021-07-13 NOTE — DISCHARGE INSTRUCTIONS
It was a pleasure taking care of you in our Emergency Department today. We know that when you come to [address] [place] [address], you are entrusting us with your health, comfort, and safety. Our physicians and nurses honor that trust, and truly appreciate the opportunity to care for you and your loved ones. We also value your feedback. If you receive a survey about your Emergency Department experience today, please fill it out. We care about our patients' feedback, and we listen to what you have to say. Thank you!

## 2021-07-16 LAB
BACTERIA SPEC CULT: ABNORMAL
BACTERIA SPEC CULT: ABNORMAL
CC UR VC: ABNORMAL
SERVICE CMNT-IMP: ABNORMAL

## 2021-10-14 ENCOUNTER — HOSPITAL ENCOUNTER (EMERGENCY)
Age: 86
Discharge: HOME | End: 2021-10-15
Attending: EMERGENCY MEDICINE
Payer: MEDICARE

## 2021-10-14 ENCOUNTER — APPOINTMENT (OUTPATIENT)
Dept: GENERAL RADIOLOGY | Age: 86
End: 2021-10-14
Attending: EMERGENCY MEDICINE
Payer: MEDICARE

## 2021-10-14 DIAGNOSIS — N18.9 CHRONIC KIDNEY DISEASE, UNSPECIFIED CKD STAGE: ICD-10-CM

## 2021-10-14 DIAGNOSIS — I50.9 ACUTE ON CHRONIC CONGESTIVE HEART FAILURE, UNSPECIFIED HEART FAILURE TYPE (HCC): Primary | ICD-10-CM

## 2021-10-14 LAB
ALBUMIN SERPL-MCNC: 3.4 G/DL (ref 3.5–5)
ALBUMIN/GLOB SERPL: 0.9 {RATIO} (ref 1.1–2.2)
ALP SERPL-CCNC: 61 U/L (ref 45–117)
ALT SERPL-CCNC: 20 U/L (ref 12–78)
ANION GAP SERPL CALC-SCNC: 11 MMOL/L (ref 5–15)
AST SERPL-CCNC: 27 U/L (ref 15–37)
BASOPHILS # BLD: 0 K/UL (ref 0–0.1)
BASOPHILS NFR BLD: 1 % (ref 0–1)
BILIRUB SERPL-MCNC: 0.8 MG/DL (ref 0.2–1)
BNP SERPL-MCNC: 6152 PG/ML (ref 0–450)
BUN SERPL-MCNC: 24 MG/DL (ref 6–20)
BUN/CREAT SERPL: 9 (ref 12–20)
CALCIUM SERPL-MCNC: 8.9 MG/DL (ref 8.5–10.1)
CHLORIDE SERPL-SCNC: 104 MMOL/L (ref 97–108)
CO2 SERPL-SCNC: 27 MMOL/L (ref 21–32)
CREAT SERPL-MCNC: 2.54 MG/DL (ref 0.55–1.02)
DIFFERENTIAL METHOD BLD: ABNORMAL
EOSINOPHIL # BLD: 0 K/UL (ref 0–0.4)
EOSINOPHIL NFR BLD: 1 % (ref 0–7)
ERYTHROCYTE [DISTWIDTH] IN BLOOD BY AUTOMATED COUNT: 16.3 % (ref 11.5–14.5)
GLOBULIN SER CALC-MCNC: 4 G/DL (ref 2–4)
GLUCOSE SERPL-MCNC: 108 MG/DL (ref 65–100)
HCT VFR BLD AUTO: 27.4 % (ref 35–47)
HGB BLD-MCNC: 8.6 G/DL (ref 11.5–16)
IMM GRANULOCYTES # BLD AUTO: 0 K/UL (ref 0–0.04)
IMM GRANULOCYTES NFR BLD AUTO: 0 % (ref 0–0.5)
INR PPP: 1.3 (ref 0.9–1.1)
LYMPHOCYTES # BLD: 0.9 K/UL (ref 0.8–3.5)
LYMPHOCYTES NFR BLD: 22 % (ref 12–49)
MCH RBC QN AUTO: 29.7 PG (ref 26–34)
MCHC RBC AUTO-ENTMCNC: 31.4 G/DL (ref 30–36.5)
MCV RBC AUTO: 94.5 FL (ref 80–99)
MONOCYTES # BLD: 0.5 K/UL (ref 0–1)
MONOCYTES NFR BLD: 13 % (ref 5–13)
NEUTS SEG # BLD: 2.7 K/UL (ref 1.8–8)
NEUTS SEG NFR BLD: 63 % (ref 32–75)
NRBC # BLD: 0 K/UL (ref 0–0.01)
NRBC BLD-RTO: 0 PER 100 WBC
PLATELET # BLD AUTO: 157 K/UL (ref 150–400)
PMV BLD AUTO: 11 FL (ref 8.9–12.9)
POTASSIUM SERPL-SCNC: 3.6 MMOL/L (ref 3.5–5.1)
PROT SERPL-MCNC: 7.4 G/DL (ref 6.4–8.2)
PROTHROMBIN TIME: 13.1 SEC (ref 9–11.1)
RBC # BLD AUTO: 2.9 M/UL (ref 3.8–5.2)
SODIUM SERPL-SCNC: 142 MMOL/L (ref 136–145)
WBC # BLD AUTO: 4.2 K/UL (ref 3.6–11)

## 2021-10-14 PROCEDURE — 85610 PROTHROMBIN TIME: CPT

## 2021-10-14 PROCEDURE — 36415 COLL VENOUS BLD VENIPUNCTURE: CPT

## 2021-10-14 PROCEDURE — 99284 EMERGENCY DEPT VISIT MOD MDM: CPT

## 2021-10-14 PROCEDURE — 96374 THER/PROPH/DIAG INJ IV PUSH: CPT

## 2021-10-14 PROCEDURE — 83880 ASSAY OF NATRIURETIC PEPTIDE: CPT

## 2021-10-14 PROCEDURE — 80053 COMPREHEN METABOLIC PANEL: CPT

## 2021-10-14 PROCEDURE — 93005 ELECTROCARDIOGRAM TRACING: CPT

## 2021-10-14 PROCEDURE — 71045 X-RAY EXAM CHEST 1 VIEW: CPT

## 2021-10-14 PROCEDURE — 85025 COMPLETE CBC W/AUTO DIFF WBC: CPT

## 2021-10-14 PROCEDURE — 74011250636 HC RX REV CODE- 250/636: Performed by: EMERGENCY MEDICINE

## 2021-10-14 RX ORDER — BUMETANIDE 2 MG/1
2 TABLET ORAL DAILY
Qty: 30 TABLET | Refills: 0 | Status: SHIPPED | OUTPATIENT
Start: 2021-10-14

## 2021-10-14 RX ORDER — FUROSEMIDE 10 MG/ML
80 INJECTION INTRAMUSCULAR; INTRAVENOUS
Status: COMPLETED | OUTPATIENT
Start: 2021-10-14 | End: 2021-10-14

## 2021-10-14 RX ADMIN — FUROSEMIDE 80 MG: 10 INJECTION, SOLUTION INTRAVENOUS at 22:04

## 2021-10-14 NOTE — ED TRIAGE NOTES
Pt arrives with c/o R leg pain and swelling x 5PM, pt states she had a scratch on her leg a few days ago. Pt reports leg is warm to the touch. Shortness of breath at baseline due to heart conditions, but states it has gotten worse today. States her doctor's are at "[facility],".

## 2021-10-15 VITALS
DIASTOLIC BLOOD PRESSURE: 73 MMHG | TEMPERATURE: 98.2 F | HEART RATE: 60 BPM | RESPIRATION RATE: 17 BRPM | HEIGHT: 60 IN | BODY MASS INDEX: 40.84 KG/M2 | SYSTOLIC BLOOD PRESSURE: 153 MMHG | OXYGEN SATURATION: 97 % | WEIGHT: 208 LBS

## 2021-10-15 LAB
ATRIAL RATE: 63 BPM
CALCULATED R AXIS, ECG10: -52 DEGREES
CALCULATED T AXIS, ECG11: 105 DEGREES
DIAGNOSIS, 93000: NORMAL
Q-T INTERVAL, ECG07: 448 MS
QRS DURATION, ECG06: 94 MS
QTC CALCULATION (BEZET), ECG08: 462 MS
VENTRICULAR RATE, ECG03: 64 BPM

## 2021-10-15 NOTE — ED PROVIDER NOTES
EMERGENCY DEPARTMENT HISTORY AND PHYSICAL EXAM      Date: 10/14/2021  Patient Name: Silas Oliva    Please note that this dictation was completed with [employer], the computer voice recognition software. Quite often unanticipated grammatical, syntax, homophones, and other interpretive errors are inadvertently transcribed by the computer software. Please disregard these errors. Please excuse any errors that have escaped final proofreading. History of Presenting Illness     Chief Complaint   Patient presents with    Leg Pain       History Provided By: Patient     HPI: Silas Oliva, 80 y.o. female, with past medical history significant for HIV on antiretrovirals nondetectable viral loads, congestive heart failure, aortic valve disease not a candidate for replacement, coronary artery disease presenting the emergency department with lower extremity swelling. She reports one episode of severe right leg pain, daughter thought that the right leg looked more red or inflamed. No fever or chills. No purulent drainage he does have a wound on the medial aspect of the right leg. No known trauma. Patient normally ambulates with a walker. PCP: Barrington Jimenez MD    No current facility-administered medications on file prior to encounter. Current Outpatient Medications on File Prior to Encounter   Medication Sig Dispense Refill    apixaban (ELIQUIS) 2.5 mg tablet 2.5 mg = 1 tab each dose, PO, every 12 hours, # 180 tab, 3 Refills, Pharmacy: [facility] #[id number]      nystatin (MYCOSTATIN) powder Apply  to affected area four (4) times daily.  hydrALAZINE (APRESOLINE) 50 mg tablet 50 mg = 1 tab each dose, PO, tid, # 90 tab, 5 Refills, Pharmacy: [facility] #[id number]      dolutegravir-rilpivirine (JULUCA) 50-25 mg tab tablet = 1 tab each dose, PO, daily, with food, # 30 tab, 5 Refills, Pharmacy: [facility] #[id number]      amiodarone (CORDARONE) 200 mg tablet Take 200 mg by mouth.       nitrofurantoin (MACRODANTIN) 100 mg capsule Take 100 mg by mouth nightly.  triamcinolone acetonide (KENALOG) 0.1 % topical cream Apply  to affected area two (2) times daily as needed for Skin Irritation. use thin layer      cholecalciferol (VITAMIN D3) 1,000 unit cap daily. 1    aspirin delayed-release 81 mg tablet Take 81 mg by mouth daily.  atorvastatin (LIPITOR) 40 mg tablet Take 40 mg by mouth daily. 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       Past Surgical History:  Past Surgical History:   Procedure Laterality Date    HX CHOLECYSTECTOMY      HX HEENT      cataract    HX HYSTERECTOMY      HX MYOMECTOMY      HX ORTHOPAEDIC      ID CARDIAC SURG PROCEDURE UNLIST  1995    Stent Placement       Family History:  Family History   Problem Relation Age of Onset    Diabetes Other     Hypertension Other     Stroke Daughter     Other Daughter         sarcoidosis    Other Grandchild         sarcoidosis       Social History:  Social History     Tobacco Use    Smoking status: Never Smoker    Smokeless tobacco: Never Used   Substance Use Topics    Alcohol use: No    Drug use: No       Allergies: Allergies   Allergen Reactions    Coumadin [Warfarin] Hives    Penicillanic Sulfone Bl Beta-Lactamase Inhibitors Rash         Review of Systems   Review of Systems   Constitutional: Negative for chills and fever. HENT: Negative for congestion and sore throat. Eyes: Negative for visual disturbance. Respiratory: Negative for cough and shortness of breath. Cardiovascular: Positive for leg swelling. Negative for chest pain. Gastrointestinal: Negative for abdominal pain, blood in stool, diarrhea and nausea. Endocrine: Negative for polyuria.    Genitourinary: Negative for dysuria, flank pain, vaginal bleeding and vaginal discharge. Musculoskeletal: Positive for myalgias. Skin: Negative for rash. Allergic/Immunologic: Negative for immunocompromised state. Neurological: Negative for weakness and headaches. Psychiatric/Behavioral: Negative for confusion. Physical Exam   Physical Exam  Vitals and nursing note reviewed. Constitutional:       Appearance: She is well-developed. She is obese. HENT:      Head: Normocephalic and atraumatic. Eyes:      General:         Right eye: No discharge. Left eye: No discharge. Conjunctiva/sclera: Conjunctivae normal.      Pupils: Pupils are equal, round, and reactive to light. Neck:      Trachea: No tracheal deviation. Cardiovascular:      Rate and Rhythm: Normal rate and regular rhythm. Heart sounds: Normal heart sounds. No murmur heard. Pulmonary:      Effort: Pulmonary effort is normal. No respiratory distress. Breath sounds: Normal breath sounds. No wheezing or rales. Abdominal:      General: Bowel sounds are normal.      Palpations: Abdomen is soft. Tenderness: There is no abdominal tenderness. There is no guarding or rebound. Musculoskeletal:         General: No tenderness or deformity. Normal range of motion. Cervical back: Normal range of motion and neck supple. Right lower leg: Edema present. Left lower leg: Edema present. Comments: Pitting edema in the bilateral lower extremities, palpable pulses in the DP bilaterally. Skin:     General: Skin is warm and dry. Findings: No erythema or rash. Comments: Legs are not hot, there is no erythema. No sign of infection there is a small wound on the medial aspect of the right leg, no surrounding erythema, no fluctuance, nontender   Neurological:      Mental Status: She is alert and oriented to person, place, and time.    Psychiatric:         Behavior: Behavior normal.         Diagnostic Study Results     Labs - Recent Results (from the past 12 hour(s))   EKG, 12 LEAD, INITIAL    Collection Time: 10/14/21  9:25 PM   Result Value Ref Range    Ventricular Rate 64 BPM    Atrial Rate 63 BPM    QRS Duration 94 ms    Q-T Interval 448 ms    QTC Calculation (Bezet) 462 ms    Calculated R Axis -52 degrees    Calculated T Axis 105 degrees    Diagnosis       Junctional rhythm  Left axis deviation  Possible Anterior infarct , age undetermined  Abnormal ECG  When compared with ECG of 12-JUL-2021 18:29,  Junctional rhythm has replaced Sinus rhythm     CBC WITH AUTOMATED DIFF    Collection Time: 10/14/21  9:40 PM   Result Value Ref Range    WBC 4.2 3.6 - 11.0 K/uL    RBC 2.90 (L) 3.80 - 5.20 M/uL    HGB 8.6 (L) 11.5 - 16.0 g/dL    HCT 27.4 (L) 35.0 - 47.0 %    MCV 94.5 80.0 - 99.0 FL    MCH 29.7 26.0 - 34.0 PG    MCHC 31.4 30.0 - 36.5 g/dL    RDW 16.3 (H) 11.5 - 14.5 %    PLATELET 352 785 - 401 K/uL    MPV 11.0 8.9 - 12.9 FL    NRBC 0.0 0  WBC    ABSOLUTE NRBC 0.00 0.00 - 0.01 K/uL    NEUTROPHILS 63 32 - 75 %    LYMPHOCYTES 22 12 - 49 %    MONOCYTES 13 5 - 13 %    EOSINOPHILS 1 0 - 7 %    BASOPHILS 1 0 - 1 %    IMMATURE GRANULOCYTES 0 0.0 - 0.5 %    ABS. NEUTROPHILS 2.7 1.8 - 8.0 K/UL    ABS. LYMPHOCYTES 0.9 0.8 - 3.5 K/UL    ABS. MONOCYTES 0.5 0.0 - 1.0 K/UL    ABS. EOSINOPHILS 0.0 0.0 - 0.4 K/UL    ABS. BASOPHILS 0.0 0.0 - 0.1 K/UL    ABS. IMM.  GRANS. 0.0 0.00 - 0.04 K/UL    DF AUTOMATED     PROTHROMBIN TIME + INR    Collection Time: 10/14/21  9:40 PM   Result Value Ref Range    INR 1.3 (H) 0.9 - 1.1      Prothrombin time 13.1 (H) 9.0 - 11.1 sec   NT-PRO BNP    Collection Time: 10/14/21  9:40 PM   Result Value Ref Range    NT pro-BNP 6,152 (H) 0 - 030 PG/ML   METABOLIC PANEL, COMPREHENSIVE    Collection Time: 10/14/21  9:40 PM   Result Value Ref Range    Sodium 142 136 - 145 mmol/L    Potassium 3.6 3.5 - 5.1 mmol/L    Chloride 104 97 - 108 mmol/L    CO2 27 21 - 32 mmol/L    Anion gap 11 5 - 15 mmol/L    Glucose 108 (H) 65 - 100 mg/dL    BUN 24 (H) 6 - 20 MG/DL    Creatinine 2.54 (H) 0.55 - 1.02 MG/DL    BUN/Creatinine ratio 9 (L) 12 - 20      GFR est AA 22 (L) >60 ml/min/1.73m2    GFR est non-AA 18 (L) >60 ml/min/1.73m2    Calcium 8.9 8.5 - 10.1 MG/DL    Bilirubin, total 0.8 0.2 - 1.0 MG/DL    ALT (SGPT) 20 12 - 78 U/L    AST (SGOT) 27 15 - 37 U/L    Alk. phosphatase 61 45 - 117 U/L    Protein, total 7.4 6.4 - 8.2 g/dL    Albumin 3.4 (L) 3.5 - 5.0 g/dL    Globulin 4.0 2.0 - 4.0 g/dL    A-G Ratio 0.9 (L) 1.1 - 2.2         Radiologic Studies -   XR CHEST PORT   Final Result   Cardiac contour enlargement unchanged. No acute findings. CT Results  (Last 48 hours)    None        CXR Results  (Last 48 hours)               10/14/21 2152  XR CHEST PORT Final result    Impression:  Cardiac contour enlargement unchanged. No acute findings. Narrative:  EXAM: XR CHEST PORT       INDICATION: Assess for pulmonary edema       COMPARISON: 7/12/2021       FINDINGS: A portable AP radiograph of the chest was obtained at 2143 hours. There is no pneumothorax or pleural effusion. There is no demonstration of   consolidation or pulmonary edema. Mild-moderate cardiac contour enlargement is   again shown. Mediastinal and hilar contours are stable. The bones are   moderately osteopenic. Medical Decision Making   I am the first provider for this patient. I reviewed the vital signs, available nursing notes, past medical history, past surgical history, family history and social history. Vital Signs-Reviewed the patient's vital signs. Patient Vitals for the past 12 hrs:   Temp Pulse Resp BP SpO2   10/15/21 0000 -- 60 17 (!) 153/73 97 %   10/14/21 2300 -- (!) 55 12 137/60 96 %   10/14/21 2205 -- (!) 58 17 -- 96 %   10/14/21 2203 -- -- -- (!) 165/73 --   10/14/21 1956 98.2 °F (36.8 °C) 60 16 (!) 148/57 96 %       EKG interpretation: (Preliminary)  EKG shows what appears to be sinus rhythm with a first-degree AV block.   No evidence of ST elevation myocardial infarction . Interpreted by me    Records Reviewed:   Nursing notes, Prior visits     Provider Notes (Medical Decision Making):   Patient evaluated found to be in no acute cardiopulmonary distress. She is not in obvious pulmonary edema on physical exam.  Has pitting edema in the bilateral lower extremities, she does have a history of aortic valve disease and is not a candidate for any valve surgery. Her cardiologist have recommended hospice, family is not necessarily ready for that idea, I think palliative care is more likely appropriate, the patient does appear to be failing her oral furosemide dosing, can give IV Lasix here tonight. Family offered hospitalization for IV diuretics, but would like to go home if possible. I will give him information for [facility] and asked that they follow-up closely with the primary care doctor or the cardiologist.  Dorrene Garb may be helpful for the patient going forward in terms of her fluid status. Compression stockings and Ace wraps for the legs would also likely be helpful. At this time there is no evidence of a skin infection. ED Course:   Initial assessment performed. The patients presenting problems have been discussed, and they are in agreement with the care plan formulated and outlined with them. I have encouraged them to ask questions as they arise throughout their visit. We will the patient discontinue Lasix and start Bumex given her kidney dysfunction Bumex may be more helpful as a diuretic for her. I have offered hospitalization, but patient family would like to go home at this time. They understand overall the patient'slong term  condition is going to be terminal given her heart disease and kidney dysfunction. They would like to try to avoid hospitalization if possible      Critical Care Time:   none    Disposition:    DISCHARGE NOTE  Patients results have been reviewed with them.   Patient and/or family have verbally conveyed their understanding and agreement of the patient's signs, symptoms, diagnosis, treatment and prognosis and additionally agree to follow up as recommended or return to the Emergency Room should their condition change or have any new concerns prior to their follow-up appointment. Patient verbally agrees with the care-plan and verbally conveys that all of their questions have been answered. Discharge instructions have also been provided to the patient with some educational information regarding their diagnosis as well a list of reasons why they would want to return to the ER prior to their follow-up appointment should their condition change. PLAN:  1. Discharge Medication List as of 10/14/2021 11:48 PM      START taking these medications    Details   bumetanide (BUMEX) 2 mg tablet Take 1 Tablet by mouth daily. , Normal, Disp-30 Tablet, R-0         CONTINUE these medications which have NOT CHANGED    Details   apixaban (ELIQUIS) 2.5 mg tablet 2.5 mg = 1 tab each dose, PO, every 12 hours, # 180 tab, 3 Refills, Pharmacy: [facility] [address] #[id number], Historical Med      nystatin (MYCOSTATIN) powder Apply  to affected area four (4) times daily. , Historical Med      hydrALAZINE (APRESOLINE) 50 mg tablet 50 mg = 1 tab each dose, PO, tid, # 90 tab, 5 Refills, Pharmacy: [facility] #[id number], Historical Med      dolutegravir-rilpivirine (JULUCA) 50-25 mg tab tablet = 1 tab each dose, PO, daily, with food, # 30 tab, 5 Refills, Pharmacy: [facility] #[id number], Historical Med      amiodarone (CORDARONE) 200 mg tablet Take 200 mg by mouth., Historical Med      nitrofurantoin (MACRODANTIN) 100 mg capsule Take 100 mg by mouth nightly., Historical Med      triamcinolone acetonide (KENALOG) 0.1 % topical cream Apply  to affected area two (2) times daily as needed for Skin Irritation. use thin layer, Historical Med      cholecalciferol (VITAMIN D3) 1,000 unit cap daily. , Historical Med, R-1 aspirin delayed-release 81 mg tablet Take 81 mg by mouth daily. , Historical Med      atorvastatin (LIPITOR) 40 mg tablet Take 40 mg by mouth daily. , Historical Med           2. Follow-up Information     Follow up With Specialties Details Why Contact Antoinette Abdalla MD Internal Medicine Schedule an appointment as soon as possible for a visit   [place] [address]  [phone]      Her Cardiologist  Schedule an appointment as soon as possible for a visit       [address] [place] [address] EMERGENCY DEPT Emergency Medicine  If symptoms worsen [place]  [phone]          Return to ED if worse     Diagnosis     Clinical Impression:   1. Acute on chronic congestive heart failure, unspecified heart failure type ([address]    2. Chronic kidney disease, unspecified CKD stage        Attestations:   This note was completed by David Ragsdale DO

## 2021-10-15 NOTE — ED NOTES
Verbal shift change report given to Michael Ignacio RN (oncoming nurse) by Russella Lennox, RN (offgoing nurse). Report included the following information SBAR, Kardex, ED Summary, Procedure Summary and MAR.

## 2021-10-15 NOTE — DISCHARGE INSTRUCTIONS
Case management should be reaching out to you about in-home care and palliative care. The information about [facility] is below. Thank you for allowing us to care for you. [facility]/ED Visit Follow-Up Instructions    What are we? [facility] is an in-home urgent care service staffed with emergency trained medical teams. [facility] comes to your home in a vehicle stocked with medical supplies (EKG, laboratories) and technology. [facility] delivers healthcare to people with complicated health issues, helping them recover safely and comfortably at home while lowering medical costs. Who are we? Two providers arrive at your home, including a physician assistant or nurse practitioner, and a [facility] medical technician. An ER physician is always available by phone for consults. When? [facility] is a resource to have a provider come see you in your home before your follow-up visit with your PCP and/or specialist.  [facility] is open 7am-9pm, 7 days a week, 365 days a year, including holidays. What can [facility] treat? [facility] can treat nearly every non-life-threatening medical condition that can be treated in an urgent care center or other health care facility. This includes a wide range of common to complex illnesses and injuries, such as urinary tract infection (UTI), respiratory infections, fall injuries, flu, migraines, dehydration, stitches and more. Why? [facility] is a complement to your physician and home health care. Our goal is to help integrate our care with that of your current caregivers. Primary care providers can be extraordinarily busy. [facility] can act as the eyes and ears on the ground for your physician when they are unable to see you, and you require immediate care.   Once your workup is complete, we'll call in your prescriptions, update your family doctor, and handle billing with your insurance so you can focus on feeling better, faster without leaving home. How long does it take for [facility] medical team to arrive at my home? After contacting our team, it typically takes about two hours for a response team to come to your location for your scheduled visit. When you call to schedule a visit, you will be given an accurate arrival time. How much? We accept most major health insurance plans, including Medicaid, Medicare, and Medicare Advantage [address] [facility], [place], [facility], and [employer]. We also accept: credit, debit, health savings account (HSA), health reimbursement account (HRA) and flexible spending account (FSA) payments. [facility]'s prices compare to conventional urgent care facilities, but we bring the care to you. How to reach us? Getting care is easy- use our mobile tavo ([employer]), website ([email or web address]) or call us [phone]. Thank you for allowing us to be a part of your medical care!

## 2021-10-15 NOTE — ED NOTES
Patient (s)  given copy of dc instructions and 0 paper script(s) and 1 electronic scripts. Patient (s)  verbalized understanding of instructions and script (s). Patient given a current medication reconciliation form and verbalized understanding of their medications. Patient (s) verbalized understanding of the importance of discussing medications with  his or her physician or clinic they will be following up with. Patient alert and oriented and in no acute distress. Patient offered wheelchair from treatment area to hospital entrance, patient transported via wheelchair.

## 2021-10-15 NOTE — ED NOTES
Pt presents to ED via wheelchair accompanied by her daughter complaining of left leg swelling x \"a few days\". Pt has a hx of CHF. Pt has bilateral pitting edema on bilateral LE but her left leg appears more swollen. Pt reports shooting pain in her left leg. Pt reports having SOB but denies chest pain. Pt's leg does not appear red and is not warm to the touch. Pt is alert and oriented x 4, RR even and unlabored, skin is warm and dry. Assessment completed and pt updated on plan of care. Call bell in reach. Emergency Department Nursing Plan of Care       The Nursing Plan of Care is developed from the Nursing assessment and Emergency Department Attending provider initial evaluation. The plan of care may be reviewed in the ED Provider note.     The Plan of Care was developed with the following considerations:   Patient / Family readiness to learn indicated by:verbalized understanding  Persons(s) to be included in education: patient and family  Barriers to Learning/Limitations:No    Signed     Myrna Martell RN    10/14/2021   9:15 PM

## 2021-10-24 ENCOUNTER — HOSPITAL ENCOUNTER (EMERGENCY)
Age: 86
Discharge: HOME | End: 2021-10-24
Attending: EMERGENCY MEDICINE
Payer: MEDICARE

## 2021-10-24 VITALS
SYSTOLIC BLOOD PRESSURE: 156 MMHG | TEMPERATURE: 97.2 F | DIASTOLIC BLOOD PRESSURE: 57 MMHG | OXYGEN SATURATION: 99 % | RESPIRATION RATE: 16 BRPM | HEART RATE: 59 BPM

## 2021-10-24 DIAGNOSIS — I50.9 ACUTE ON CHRONIC CONGESTIVE HEART FAILURE, UNSPECIFIED HEART FAILURE TYPE (HCC): Primary | ICD-10-CM

## 2021-10-24 DIAGNOSIS — N18.9 CHRONIC KIDNEY DISEASE, UNSPECIFIED CKD STAGE: ICD-10-CM

## 2021-10-24 LAB
ANION GAP SERPL CALC-SCNC: 6 MMOL/L (ref 5–15)
BUN SERPL-MCNC: 17 MG/DL (ref 6–20)
BUN/CREAT SERPL: 7 (ref 12–20)
CALCIUM SERPL-MCNC: 8.8 MG/DL (ref 8.5–10.1)
CHLORIDE SERPL-SCNC: 104 MMOL/L (ref 97–108)
CO2 SERPL-SCNC: 30 MMOL/L (ref 21–32)
CREAT SERPL-MCNC: 2.35 MG/DL (ref 0.55–1.02)
GLUCOSE SERPL-MCNC: 119 MG/DL (ref 65–100)
MAGNESIUM SERPL-MCNC: 2.2 MG/DL (ref 1.6–2.4)
POTASSIUM SERPL-SCNC: 3.7 MMOL/L (ref 3.5–5.1)
SODIUM SERPL-SCNC: 140 MMOL/L (ref 136–145)

## 2021-10-24 PROCEDURE — 80048 BASIC METABOLIC PNL TOTAL CA: CPT

## 2021-10-24 PROCEDURE — 99282 EMERGENCY DEPT VISIT SF MDM: CPT

## 2021-10-24 PROCEDURE — 96374 THER/PROPH/DIAG INJ IV PUSH: CPT

## 2021-10-24 PROCEDURE — 74011250636 HC RX REV CODE- 250/636: Performed by: EMERGENCY MEDICINE

## 2021-10-24 PROCEDURE — 36415 COLL VENOUS BLD VENIPUNCTURE: CPT

## 2021-10-24 PROCEDURE — 83735 ASSAY OF MAGNESIUM: CPT

## 2021-10-24 RX ORDER — FUROSEMIDE 10 MG/ML
80 INJECTION INTRAMUSCULAR; INTRAVENOUS
Status: COMPLETED | OUTPATIENT
Start: 2021-10-24 | End: 2021-10-24

## 2021-10-24 RX ADMIN — FUROSEMIDE 80 MG: 10 INJECTION, SOLUTION INTRAVENOUS at 11:56

## 2021-10-24 NOTE — ED NOTES
Patient and daughter given copy of dc instructions and 0 paper script(s) and 0 electronic scripts. Patient and daughter  verbalized understanding of instructions and script (s). Patient given a current medication reconciliation form and verbalized understanding of their medications. Patient and daughter  verbalized understanding of the importance of discussing medications with  his or her physician or clinic they will be following up with. Patient alert and oriented and in no acute distress. Patient offered wheelchair from treatment area to hospital entrance, patient given wheelchair to front entrance, assisted into the car.

## 2021-10-24 NOTE — ED NOTES
Patient given IV lasix. Placed a purewick suction in patient genital area per patient and daughter request.  Patient tech [facility] at bedside for chaperone.

## 2021-10-24 NOTE — ED NOTES
Patient brought here by daughter for c/o chronic lower extremity edema. Patient and daughter report hx of chronic fluid and edema issues, daughter states they have been treating the problem however unable to pursue some treatments due to age and mast medical problems. Patient and daughter report recent visit 9 days ago for similar problems. Patient and daughter states that receiving lasix IV has been helpful in the past.  Reports swelling from hips, abdomen and upper legs, radiating down to her feet. Denies fevers. Denies trauma or injury. Denies SOB. Emergency Department Nursing Plan of Care       The Nursing Plan of Care is developed from the Nursing assessment and Emergency Department Attending provider initial evaluation. The plan of care may be reviewed in the ED Provider note.     The Plan of Care was developed with the following considerations:   Patient / Family readiness to learn indicated by:verbalized understanding  Persons(s) to be included in education: patient  Barriers to Learning/Limitations:No    Signed     Alec Moran RN    10/24/2021   12:21 PM

## 2021-10-24 NOTE — ED TRIAGE NOTES
Reports edema x 1 year, worse in the last week. Pt was started on a new diuretic here last Friday but her pain in bilat legs and abd increased. She was seen by her cardiologist.  She will be seen by visiting doctors for IV lasix but that doesn't start until the middle of November.

## 2021-10-26 ENCOUNTER — TELEPHONE (OUTPATIENT)
Dept: CASE MANAGEMENT | Age: 86
End: 2021-10-26

## 2021-10-26 NOTE — TELEPHONE ENCOUNTER
Late Entry    CM receive after hour consult \" May need palliative care needs in home help\"    CM sent palliative referral for services.     [address] [place] [address]  [phone]

## 2021-10-26 NOTE — TELEPHONE ENCOUNTER
CM receive information from BS unable to accept referral as Palliative program close but suggest referral sent to [facility] home based program.    1300 CM called [facility] home based program [phone] only accepting establish patient which patient has a referral from PCP office and they will call daughter. [address] called daughter Raudel Benton she stated it was best to call the caretaker Jagdish Crawford [phone].    [phone] CM attempt to call caretaker left  for caretaker to return call.     [facility]  [phone]

## 2021-10-28 ENCOUNTER — APPOINTMENT (OUTPATIENT)
Dept: GENERAL RADIOLOGY | Age: 86
End: 2021-10-28
Attending: EMERGENCY MEDICINE
Payer: MEDICARE

## 2021-10-28 ENCOUNTER — HOSPITAL ENCOUNTER (EMERGENCY)
Age: 86
Discharge: HOME | End: 2021-10-28
Attending: EMERGENCY MEDICINE
Payer: MEDICARE

## 2021-10-28 ENCOUNTER — APPOINTMENT (OUTPATIENT)
Dept: CT IMAGING | Age: 86
End: 2021-10-28
Attending: EMERGENCY MEDICINE
Payer: MEDICARE

## 2021-10-28 VITALS
HEIGHT: 60 IN | DIASTOLIC BLOOD PRESSURE: 70 MMHG | RESPIRATION RATE: 18 BRPM | OXYGEN SATURATION: 97 % | SYSTOLIC BLOOD PRESSURE: 158 MMHG | TEMPERATURE: 97.6 F | BODY MASS INDEX: 47 KG/M2 | WEIGHT: 239.4 LBS | HEART RATE: 70 BPM

## 2021-10-28 DIAGNOSIS — K59.00 CONSTIPATION, UNSPECIFIED CONSTIPATION TYPE: ICD-10-CM

## 2021-10-28 DIAGNOSIS — R10.84 ABDOMINAL PAIN, GENERALIZED: Primary | ICD-10-CM

## 2021-10-28 DIAGNOSIS — E87.79 OTHER HYPERVOLEMIA: ICD-10-CM

## 2021-10-28 LAB
ALBUMIN SERPL-MCNC: 3.7 G/DL (ref 3.5–5)
ALBUMIN/GLOB SERPL: 0.9 {RATIO} (ref 1.1–2.2)
ALP SERPL-CCNC: 62 U/L (ref 45–117)
ALT SERPL-CCNC: 24 U/L (ref 12–78)
ANION GAP SERPL CALC-SCNC: 8 MMOL/L (ref 5–15)
APPEARANCE UR: CLEAR
AST SERPL-CCNC: 35 U/L (ref 15–37)
ATRIAL RATE: 57 BPM
BACTERIA URNS QL MICRO: ABNORMAL /HPF
BASOPHILS # BLD: 0 K/UL (ref 0–0.1)
BASOPHILS NFR BLD: 0 % (ref 0–1)
BILIRUB SERPL-MCNC: 0.9 MG/DL (ref 0.2–1)
BILIRUB UR QL: NEGATIVE
BUN SERPL-MCNC: 17 MG/DL (ref 6–20)
BUN/CREAT SERPL: 7 (ref 12–20)
CALCIUM SERPL-MCNC: 8.9 MG/DL (ref 8.5–10.1)
CALCULATED R AXIS, ECG10: -44 DEGREES
CALCULATED T AXIS, ECG11: 100 DEGREES
CHLORIDE SERPL-SCNC: 103 MMOL/L (ref 97–108)
CO2 SERPL-SCNC: 30 MMOL/L (ref 21–32)
COLOR UR: ABNORMAL
CREAT SERPL-MCNC: 2.44 MG/DL (ref 0.55–1.02)
DIAGNOSIS, 93000: NORMAL
DIFFERENTIAL METHOD BLD: ABNORMAL
EOSINOPHIL # BLD: 0 K/UL (ref 0–0.4)
EOSINOPHIL NFR BLD: 0 % (ref 0–7)
EPITH CASTS URNS QL MICRO: ABNORMAL /LPF
ERYTHROCYTE [DISTWIDTH] IN BLOOD BY AUTOMATED COUNT: 15.9 % (ref 11.5–14.5)
GLOBULIN SER CALC-MCNC: 4 G/DL (ref 2–4)
GLUCOSE SERPL-MCNC: 117 MG/DL (ref 65–100)
GLUCOSE UR STRIP.AUTO-MCNC: NEGATIVE MG/DL
HCT VFR BLD AUTO: 32.6 % (ref 35–47)
HGB BLD-MCNC: 10.1 G/DL (ref 11.5–16)
HGB UR QL STRIP: NEGATIVE
IMM GRANULOCYTES # BLD AUTO: 0 K/UL (ref 0–0.04)
IMM GRANULOCYTES NFR BLD AUTO: 0 % (ref 0–0.5)
KETONES UR QL STRIP.AUTO: NEGATIVE MG/DL
LEUKOCYTE ESTERASE UR QL STRIP.AUTO: NEGATIVE
LIPASE SERPL-CCNC: 78 U/L (ref 73–393)
LYMPHOCYTES # BLD: 0.7 K/UL (ref 0.8–3.5)
LYMPHOCYTES NFR BLD: 13 % (ref 12–49)
MCH RBC QN AUTO: 29.3 PG (ref 26–34)
MCHC RBC AUTO-ENTMCNC: 31 G/DL (ref 30–36.5)
MCV RBC AUTO: 94.5 FL (ref 80–99)
MONOCYTES # BLD: 0.4 K/UL (ref 0–1)
MONOCYTES NFR BLD: 7 % (ref 5–13)
NEUTS SEG # BLD: 4.2 K/UL (ref 1.8–8)
NEUTS SEG NFR BLD: 80 % (ref 32–75)
NITRITE UR QL STRIP.AUTO: NEGATIVE
NRBC # BLD: 0 K/UL (ref 0–0.01)
NRBC BLD-RTO: 0 PER 100 WBC
PH UR STRIP: 7.5 [PH] (ref 5–8)
PLATELET # BLD AUTO: 143 K/UL (ref 150–400)
PMV BLD AUTO: 10.9 FL (ref 8.9–12.9)
POTASSIUM SERPL-SCNC: 3.7 MMOL/L (ref 3.5–5.1)
PROT SERPL-MCNC: 7.7 G/DL (ref 6.4–8.2)
PROT UR STRIP-MCNC: NEGATIVE MG/DL
Q-T INTERVAL, ECG07: 464 MS
QRS DURATION, ECG06: 102 MS
QTC CALCULATION (BEZET), ECG08: 451 MS
RBC # BLD AUTO: 3.45 M/UL (ref 3.8–5.2)
RBC #/AREA URNS HPF: ABNORMAL /HPF (ref 0–5)
RBC MORPH BLD: ABNORMAL
SODIUM SERPL-SCNC: 141 MMOL/L (ref 136–145)
SP GR UR REFRACTOMETRY: 1.01 (ref 1–1.03)
TROPONIN-HIGH SENSITIVITY: 27 NG/L (ref 0–51)
TROPONIN-HIGH SENSITIVITY: 27 NG/L (ref 0–51)
UA: UC IF INDICATED,UAUC: ABNORMAL
UROBILINOGEN UR QL STRIP.AUTO: 0.2 EU/DL (ref 0.2–1)
VENTRICULAR RATE, ECG03: 57 BPM
WBC # BLD AUTO: 5.3 K/UL (ref 3.6–11)
WBC URNS QL MICRO: ABNORMAL /HPF (ref 0–4)

## 2021-10-28 PROCEDURE — 96374 THER/PROPH/DIAG INJ IV PUSH: CPT

## 2021-10-28 PROCEDURE — 99284 EMERGENCY DEPT VISIT MOD MDM: CPT

## 2021-10-28 PROCEDURE — 81001 URINALYSIS AUTO W/SCOPE: CPT

## 2021-10-28 PROCEDURE — 85025 COMPLETE CBC W/AUTO DIFF WBC: CPT

## 2021-10-28 PROCEDURE — 74011250636 HC RX REV CODE- 250/636: Performed by: EMERGENCY MEDICINE

## 2021-10-28 PROCEDURE — 36415 COLL VENOUS BLD VENIPUNCTURE: CPT

## 2021-10-28 PROCEDURE — 84484 ASSAY OF TROPONIN QUANT: CPT

## 2021-10-28 PROCEDURE — 83690 ASSAY OF LIPASE: CPT

## 2021-10-28 PROCEDURE — 74176 CT ABD & PELVIS W/O CONTRAST: CPT

## 2021-10-28 PROCEDURE — 80053 COMPREHEN METABOLIC PANEL: CPT

## 2021-10-28 PROCEDURE — 71045 X-RAY EXAM CHEST 1 VIEW: CPT

## 2021-10-28 RX ORDER — MAGNESIUM CITRATE
148 SOLUTION, ORAL ORAL
Qty: 295 ML | Refills: 0 | Status: SHIPPED | OUTPATIENT
Start: 2021-10-28 | End: 2021-10-28

## 2021-10-28 RX ORDER — FUROSEMIDE 10 MG/ML
80 INJECTION INTRAMUSCULAR; INTRAVENOUS
Status: COMPLETED | OUTPATIENT
Start: 2021-10-28 | End: 2021-10-28

## 2021-10-28 RX ORDER — POLYETHYLENE GLYCOL 3350 17 G/17G
17 POWDER, FOR SOLUTION ORAL DAILY
Qty: 595 G | Refills: 0 | Status: SHIPPED | OUTPATIENT
Start: 2021-10-28

## 2021-10-28 RX ORDER — FUROSEMIDE 10 MG/ML
80 INJECTION INTRAMUSCULAR; INTRAVENOUS
Status: DISCONTINUED | OUTPATIENT
Start: 2021-10-28 | End: 2021-10-28

## 2021-10-28 RX ADMIN — FUROSEMIDE 80 MG: 10 INJECTION, SOLUTION INTRAVENOUS at 12:37

## 2021-10-28 NOTE — ED TRIAGE NOTES
Pt arrives in the ED with complaints of generalized upper abdominal pain described as aching starting this morning. Last normal bowel movement was 2 days ago. Per daughter, pt received enema and suppository this am to assist with bowel movement with little help. Pt denies vomiting. Epigastric pain

## 2021-10-28 NOTE — ED PROVIDER NOTES
EMERGENCY DEPARTMENT HISTORY AND PHYSICAL EXAM      Date: 10/28/2021  Patient Name: Namrata Rodriguez    History of Presenting Illness     Chief Complaint   Patient presents with    Abdominal Pain       History Provided By: Patient and Patient's Daughter    HPI: Namrata Rodriguez, 80 y.o. female presents to the ED with cc of abdominal pain. 59-year-old female with a complex past medical history notable for atrial fibrillation on NOAC, HIV on antiretrovirals, CHF on Bumex presents emergency department with abdominal pain. Patient is accompanied by family. Patient seen in the ED Sunday for fluid overload and shortness of breath. Family reports 2 to 3 days of intermittent \"gas\" pains. Reports has not had a bowel movement in 3 days. Family tried enema and suppository without relief. No vomiting. History of hysterectomy and cholecystectomy in past.  No chest pain. This morning reports pain was more severe prompting them to bring patient to the emergency department. They note the patient also has lower extremity swelling and some mild dyspnea on exertion which has been chronic for some time. No fevers or chills. Scheduled to begin services with [facility] outpatient home health however this does not start until Thursday. There are no other complaints, changes, or physical findings at this time. PCP: Le Gross MD    Current Facility-Administered Medications on File Prior to Encounter   Medication Dose Route Frequency Provider Last Rate Last Admin    [DISCONTINUED] furosemide (LASIX) injection 80 mg  80 mg IntraVENous NOW Rashid Rudolph MD         Current Outpatient Medications on File Prior to Encounter   Medication Sig Dispense Refill    bumetanide (BUMEX) 2 mg tablet Take 1 Tablet by mouth daily.  30 Tablet 0    apixaban (ELIQUIS) 2.5 mg tablet 2.5 mg = 1 tab each dose, PO, every 12 hours, # 180 tab, 3 Refills, Pharmacy: [facility] #[id number]      nystatin (MYCOSTATIN) powder Apply  to affected area four (4) times daily.  hydrALAZINE (APRESOLINE) 50 mg tablet 50 mg = 1 tab each dose, PO, tid, # 90 tab, 5 Refills, Pharmacy: [facility] #[id number]      dolutegravir-rilpivirine (JULUCA) 50-25 mg tab tablet = 1 tab each dose, PO, daily, with food, # 30 tab, 5 Refills, Pharmacy: [facility] #[id number]      amiodarone (CORDARONE) 200 mg tablet Take 200 mg by mouth.  nitrofurantoin (MACRODANTIN) 100 mg capsule Take 100 mg by mouth nightly.  triamcinolone acetonide (KENALOG) 0.1 % topical cream Apply  to affected area two (2) times daily as needed for Skin Irritation. use thin layer      cholecalciferol (VITAMIN D3) 1,000 unit cap daily. 1    aspirin delayed-release 81 mg tablet Take 81 mg by mouth daily.  atorvastatin (LIPITOR) 40 mg tablet Take 40 mg by mouth daily. Past History     Past Medical History:  Past Medical History:   Diagnosis Date    Arthritis     CAD (coronary artery disease)     Stents    Diabetes ([facility] [address]     Hypertension     Incontinence     Infectious disease     HIV in 80 from blood transfusion       Past Surgical History:  Past Surgical History:   Procedure Laterality Date    HX CHOLECYSTECTOMY      HX HEENT      cataract    HX HYSTERECTOMY      HX MYOMECTOMY      HX ORTHOPAEDIC      [place] CARDIAC SURG PROCEDURE UNLIST  1995    Stent Placement       Family History:  Family History   Problem Relation Age of Onset    Diabetes Other     Hypertension Other     Stroke Daughter     Other Daughter         sarcoidosis    Other Grandchild         sarcoidosis       Social History:  Social History     Tobacco Use    Smoking status: Never Smoker    Smokeless tobacco: Never Used   Substance Use Topics    Alcohol use: No    Drug use: No       Allergies:   Allergies   Allergen Reactions    Coumadin [Warfarin] Hives    Penicillanic Sulfone Bl Beta-Lactamase Inhibitors Rash         Review of Systems   Review of Systems   Constitutional: Negative for activity change, chills and fever. HENT: Negative for facial swelling and voice change. Eyes: Negative for redness. Respiratory: Positive for shortness of breath. Negative for cough and wheezing. Cardiovascular: Negative for chest pain and leg swelling. Gastrointestinal: Positive for abdominal pain and constipation. Negative for blood in stool, diarrhea, nausea and vomiting. Genitourinary: Negative for decreased urine volume and frequency. Musculoskeletal: Negative for gait problem. Skin: Negative for pallor and rash. Neurological: Negative for tremors and facial asymmetry. Psychiatric/Behavioral: Negative for agitation. All other systems reviewed and are negative. Physical Exam   Physical Exam  Vitals and nursing note reviewed. Constitutional:       Comments: 66-year-old female, appears in no acute distress, resting on stretcher   HENT:      Head: Normocephalic and atraumatic. Cardiovascular:      Rate and Rhythm: Tachycardia present. Rhythm irregular. Heart sounds: No murmur heard. No friction rub. No gallop. Pulmonary:      Effort: Pulmonary effort is normal.      Breath sounds: Normal breath sounds. Comments: Mild tachypnea, decreased lung sounds at the bases. Abdominal:      General: Abdomen is protuberant. Palpations: Abdomen is soft. Tenderness: There is no abdominal tenderness. Hernia: No hernia is present. Musculoskeletal:         General: Normal range of motion. Cervical back: Normal range of motion. Right lower le+ Edema present. Left lower le+ Pitting Edema present. Skin:     General: Skin is warm. Capillary Refill: Capillary refill takes less than 2 seconds. Neurological:      General: No focal deficit present. Mental Status: She is alert.    Psychiatric:         Mood and Affect: Mood normal.         Diagnostic Study Results     Labs -     Recent Results (from the past 12 hour(s))   EKG, 12 LEAD, INITIAL    Collection Time: 10/28/21 10:43 AM   Result Value Ref Range    Ventricular Rate 57 BPM    Atrial Rate 57 BPM    QRS Duration 102 ms    Q-T Interval 464 ms    QTC Calculation (Bezet) 451 ms    Calculated R Axis -44 degrees    Calculated T Axis 100 degrees    Diagnosis       Junctional rhythm with occasional premature ventricular complexes  Left axis deviation  Possible Anterior infarct (cited on or before 28-OCT-2021)  Abnormal ECG  When compared with ECG of 14-OCT-2021 21:25,  Junctional rhythm has replaced Sinus rhythm     CBC WITH AUTOMATED DIFF    Collection Time: 10/28/21  1:18 PM   Result Value Ref Range    WBC 5.3 3.6 - 11.0 K/uL    RBC 3.45 (L) 3.80 - 5.20 M/uL    HGB 10.1 (L) 11.5 - 16.0 g/dL    HCT 32.6 (L) 35.0 - 47.0 %    MCV 94.5 80.0 - 99.0 FL    MCH 29.3 26.0 - 34.0 PG    MCHC 31.0 30.0 - 36.5 g/dL    RDW 15.9 (H) 11.5 - 14.5 %    PLATELET 398 (L) 229 - 400 K/uL    MPV 10.9 8.9 - 12.9 FL    NRBC 0.0 0  WBC    ABSOLUTE NRBC 0.00 0.00 - 0.01 K/uL    NEUTROPHILS 80 (H) 32 - 75 %    LYMPHOCYTES 13 12 - 49 %    MONOCYTES 7 5 - 13 %    EOSINOPHILS 0 0 - 7 %    BASOPHILS 0 0 - 1 %    IMMATURE GRANULOCYTES 0 0.0 - 0.5 %    ABS. NEUTROPHILS 4.2 1.8 - 8.0 K/UL    ABS. LYMPHOCYTES 0.7 (L) 0.8 - 3.5 K/UL    ABS. MONOCYTES 0.4 0.0 - 1.0 K/UL    ABS. EOSINOPHILS 0.0 0.0 - 0.4 K/UL    ABS. BASOPHILS 0.0 0.0 - 0.1 K/UL    ABS. IMM.  GRANS. 0.0 0.00 - 0.04 K/UL    DF SMEAR SCANNED      RBC COMMENTS ANISOCYTOSIS  1+       METABOLIC PANEL, COMPREHENSIVE    Collection Time: 10/28/21  1:18 PM   Result Value Ref Range    Sodium 141 136 - 145 mmol/L    Potassium 3.7 3.5 - 5.1 mmol/L    Chloride 103 97 - 108 mmol/L    CO2 30 21 - 32 mmol/L    Anion gap 8 5 - 15 mmol/L    Glucose 117 (H) 65 - 100 mg/dL    BUN 17 6 - 20 MG/DL    Creatinine 2.44 (H) 0.55 - 1.02 MG/DL    BUN/Creatinine ratio 7 (L) 12 - 20      GFR est AA 23 (L) >60 ml/min/1.73m2    GFR est non-AA 19 (L) >60 ml/min/1.73m2    Calcium 8.9 8.5 - 10.1 MG/DL    Bilirubin, total 0.9 0.2 - 1.0 MG/DL    ALT (SGPT) 24 12 - 78 U/L    AST (SGOT) 35 15 - 37 U/L    Alk. phosphatase 62 45 - 117 U/L    Protein, total 7.7 6.4 - 8.2 g/dL    Albumin 3.7 3.5 - 5.0 g/dL    Globulin 4.0 2.0 - 4.0 g/dL    A-G Ratio 0.9 (L) 1.1 - 2.2     LIPASE    Collection Time: 10/28/21  1:18 PM   Result Value Ref Range    Lipase 78 73 - 393 U/L   TROPONIN-HIGH SENSITIVITY    Collection Time: 10/28/21  1:18 PM   Result Value Ref Range    Troponin-High Sensitivity 27 0 - 51 ng/L   URINALYSIS W/ REFLEX CULTURE    Collection Time: 10/28/21  2:33 PM    Specimen: Urine   Result Value Ref Range    Color YELLOW/STRAW      Appearance CLEAR CLEAR      Specific gravity 1.010 1.003 - 1.030      pH (UA) 7.5 5.0 - 8.0      Protein Negative NEG mg/dL    Glucose Negative NEG mg/dL    Ketone Negative NEG mg/dL    Bilirubin Negative NEG      Blood Negative NEG      Urobilinogen 0.2 0.2 - 1.0 EU/dL    Nitrites Negative NEG      Leukocyte Esterase Negative NEG      WBC 0-4 0 - 4 /hpf    RBC 0-5 0 - 5 /hpf    Epithelial cells FEW FEW /lpf    Bacteria 1+ (A) NEG /hpf    UA:UC IF INDICATED CULTURE NOT INDICATED BY UA RESULT CNI     TROPONIN-HIGH SENSITIVITY    Collection Time: 10/28/21  3:03 PM   Result Value Ref Range    Troponin-High Sensitivity 27 0 - 51 ng/L       Radiologic Studies -   CT ABD PELV WO CONT   Final Result   Extensive soft tissue edema is compatible with third spacing of fluids. Large   volume ascites again demonstrated. Hyperdense liver compatible with amiodarone   use. XR CHEST PORT   Final Result   1. Evidence of cardiomegaly. No definite evidence of congestive change. 2. Prominence of the right basilar markings as described above. CT Results  (Last 48 hours)               10/28/21 1137  CT ABD PELV WO CONT Final result    Impression:  Extensive soft tissue edema is compatible with third spacing of fluids. Large   volume ascites again demonstrated.  Hyperdense liver compatible with amiodarone   use. Narrative:  EXAM: CT ABD PELV WO CONT       INDICATION: abdominal pain       COMPARISON: 4/23/2021       CONTRAST:  None. TECHNIQUE:    Thin axial images were obtained through the abdomen and pelvis. Coronal and   sagittal reformats were generated. Oral contrast was not administered. CT dose   reduction was achieved through use of a standardized protocol tailored for this   examination and automatic exposure control for dose modulation. The absence of intravenous contrast material reduces the sensitivity for   evaluation of the vasculature and solid organs. FINDINGS:    LOWER THORAX: No significant abnormality in the incidentally imaged lower chest.   LIVER: Somewhat hyperdense compatible with amiodarone use. Contour is somewhat   nodular, however no mass is identified. BILIARY TREE: The gallbladder is surgically absent. CBD is not dilated. SPLEEN: within normal limits. PANCREAS: Atrophic. ADRENALS: Unremarkable. KIDNEYS/URETERS: Small right renal cysts. No renal or ureteral stone or evidence   of hydronephrosis. STOMACH: Unremarkable. SMALL BOWEL: No dilatation or wall thickening. COLON: No dilatation or wall thickening. APPENDIX: Not visualized. PERITONEUM: Large volume ascites. RETROPERITONEUM: No lymphadenopathy or aortic aneurysm. REPRODUCTIVE ORGANS: Status post hysterectomy. URINARY BLADDER: No mass or calculus. BONES: Degenerative changes are seen in the lumbar spine. ABDOMINAL WALL: Extensive soft tissue edema is noted. ADDITIONAL COMMENTS: N/A               CXR Results  (Last 48 hours)               10/28/21 1113  XR CHEST PORT Final result    Impression:  1. Evidence of cardiomegaly. No definite evidence of congestive change. 2. Prominence of the right basilar markings as described above.        Narrative:  EXAM: XR CHEST PORT       INDICATION: Loss of breath       COMPARISON: Chest dated 10/14/2021 TECHNIQUE: 2 portable frontal views       FINDINGS: The patient is slightly rotated towards the left. The cardiac   silhouette continues to be enlarged. There is no definite evidence of congestive   change. There is slight prominence of the right basilar markings. Medical Decision Making   I am the first provider for this patient. I reviewed the vital signs, available nursing notes, past medical history, past surgical history, family history and social history. Vital Signs-Reviewed the patient's vital signs. Patient Vitals for the past 12 hrs:   Temp Pulse Resp BP SpO2   10/28/21 1434 -- (!) 56 -- (!) 164/66 97 %   10/28/21 1237 -- 65 -- (!) 174/78 --   10/28/21 1041 97.9 °F (36.6 °C) 88 18 (!) 184/82 91 %     Records Reviewed: Nursing Notes and Old Medical Records    Provider Notes (Medical Decision Making):     59-year-old female presents emergency department with a chief complaint of abdominal pain. Screening EKG shows sinus bradycardia. Vital signs stable other than borderline low O2 sats with poor pleth. Appears fluid overloaded consistent with history. Patient's daughter reports in past IV Lasix has been helpful. Currently on Bumex. We will check EKG and troponin and x-ray to exclude atypical ACS causing patient's abdominal pain. Check labs, including lipase. CT abdomen. Differentials broad, includes ascites secondary to fluid overload, does not appear to be SBP, small bowel obstruction, constipation. Doubt mesenteric ischemia given patient's well appearance and not currently having pain. She is also anticoagulated for A. Fib. Reassessed, however patient's family would like to take patient home if possible. ED Course:   Initial assessment performed. The patients presenting problems have been discussed, and they are in agreement with the care plan formulated and outlined with them. I have encouraged them to ask questions as they arise throughout their visit.     ED Course as of Oct 28 1602   Thu Oct 28, 2021   1043 Preliminary EKG interpreted by me. Shows sinus bradycardia with a HR of 57. No ST elevations or depressions concerning for ischemia. Normal intervals. [MB]   1230 CT negative, notable for ascites likely 2/2 h/o CHF, ordered lasix. [MB]   2747 Labs show improved anemia, stable renal function. Lipase normal.  Troponin nonspecifically elevated 27, will repeat. [MB]   7412 Patient sleeping in bed, good UOP. [MB]   1547 Delta troponin is stable. Will discharge at this time. [MB]   9738 Patient reassessed, reports feels the urge to have a bowel movement. Discussed that we will prescribe MiraLAX and magnesium citrate as needed. Discussed following up with PCP and [facility] health for possible IV diuretic infusions. discussed with patient and family, she has diuresed well, not requiring oxygen, they are focusing more on symptom management and goals are to get patient home. Given this we will discharge patient with return precautions and laxatives. [MB]      ED Course User Index  [MB] Karla Goins MD     Updates as above. Patient resting in bed and family counseled and agreeable to plan. Raiza Waldron MD      Disposition:    Discharged    DISCHARGE PLAN:  1. Current Discharge Medication List      START taking these medications    Details   polyethylene glycol (Miralax) 17 gram/dose powder Take 17 g by mouth daily. 1 tablespoon with 8 oz of water daily  Qty: 595 g, Refills: 0  Start date: 10/28/2021      magnesium citrate solution Take 148 mL by mouth once as needed (constipation) for up to 1 dose. May repeat in 12 hours if no response. Qty: 295 mL, Refills: 0  Start date: 10/28/2021, End date: 10/28/2021           2.    Follow-up Information     Follow up With Specialties Details Why Raul Verdin MD Internal Medicine In 3 days  [address] [address]  [phone]      [address] [place] [address] EMERGENCY DEPT Emergency Medicine  If symptoms worsen Baltazar Rodríguez  [phone]        3. Return to ED if worse     Diagnosis     Clinical Impression:   1. Abdominal pain, generalized    2. Constipation, unspecified constipation type    3. Other hypervolemia        Attestations:    Alvarez Green MD    Please note that this dictation was completed with [employer], the computer voice recognition software. Quite often unanticipated grammatical, syntax, homophones, and other interpretive errors are inadvertently transcribed by the computer software. Please disregard these errors. Please excuse any errors that have escaped final proofreading. Thank you.

## 2021-10-28 NOTE — ED NOTES
Pt discharged in the care of granddaughter (at bedside). Pt medications are called in to the pharmacy and instructions given to granddaughter and pt. Pt ambulatory with assist, pt wheeled out by granddaughter in wheel chair. Pt appears stable, VSS, AOx3, calm and cooperative.

## 2021-10-28 NOTE — ED NOTES
Pt presents to ED accompanied by daughter/caregiver who reports pt had an acute onset of severe abdominal pain x this morning. Pt was also reporting feeling \"gassy. \" Per pt's daughter, they tried to give pt an enema without success as well as a suppository. Pt has had multiple visits to the hospital recently for worsening edema related to CHF. Per daughter, the pt is usually treated as outpatient and given \"IV fluid medication\" and sent home. Care focus is on keeping pt comfortable. Swelling noted to lower extremities bilaterally and is tender. Pt's abdomen also significantly swollen and causing pt a considerable amount of pain and discomfort. Pt resting on stretcher in NAD with family at bedside. RR even and unlabored, skin is warm and dry. Call bell within reach. Pt A/O x 4. Emergency Department Nursing Plan of Care       The Nursing Plan of Care is developed from the Nursing assessment and Emergency Department Attending provider initial evaluation. The plan of care may be reviewed in the ED Provider note.     The Plan of Care was developed with the following considerations:   Patient / Family readiness to learn indicated by:verbalized understanding  Persons(s) to be included in education: patient  Barriers to Learning/Limitations:No    Signed     Helena Wilson RN    10/28/2021   11:02 AM

## 2021-10-28 NOTE — DISCHARGE INSTRUCTIONS
You were seen in the ER for your symptoms and given Lasix. Your labs looked good and your CAT scan did not show any obstruction but did show some fluid on the abdomen. Please follow-up with your primary care doctor. Please use the MiraLAX to help soften your stool, for severe constipation you can use the magnesium citrate. Return for new or worsening symptoms anytime.

## 2021-10-29 NOTE — ED PROVIDER NOTES
EMERGENCY DEPARTMENT HISTORY AND PHYSICAL EXAM      Date: 10/24/2021  Patient Name: John Dobson    History of Presenting Illness     Chief Complaint   Patient presents with    Peripheral Edema       History Provided By: Patient    HPI: John Dobson, 80 y.o. female with PMHx as noted below presents the emergency department for evaluation of leg swelling. Patient reports she has been experiencing worsening lower extremity and abdominal edema for the last 1 year. Patient reports that symptoms seem to be acutely worsened over the last 1 week. She was started on a new diuretic last week however notes that the swelling has continued to worsen. She notes that the swelling extends from her lower legs up to her lower abdomen. She describes it as a mild, dull aching discomfort but does not radiate. Patient reports that the plan is to start her on IV Lasix infusions at home however this would not start until next month so is presenting to the emergency department to receive dose. Otherwise she has no additional acute complaints. Pt denies any other alleviating or exacerbating factors. Additionally, pt specifically denies any recent fever, chills, headache, nausea, vomiting, abdominal pain, CP, SOB, lightheadedness, dizziness, numbness, weakness, , heart palpitations, urinary sxs, diarrhea, constipation, melena, hematochezia, cough, or congestion. PCP: Filiberto Ho MD    Current Outpatient Medications   Medication Sig Dispense Refill    bumetanide (BUMEX) 2 mg tablet Take 1 Tablet by mouth daily. 30 Tablet 0    apixaban (ELIQUIS) 2.5 mg tablet 2.5 mg = 1 tab each dose, PO, every 12 hours, # 180 tab, 3 Refills, Pharmacy: [facility] #[id number]      nystatin (MYCOSTATIN) powder Apply  to affected area four (4) times daily.       hydrALAZINE (APRESOLINE) 50 mg tablet 50 mg = 1 tab each dose, PO, tid, # 90 tab, 5 Refills, Pharmacy: [facility] #[id number]      dolutegravir-rilpivirine (Starke Pastel) 50-25 mg tab tablet = 1 tab each dose, PO, daily, with food, # 30 tab, 5 Refills, Pharmacy: [facility] #[id number]      amiodarone (CORDARONE) 200 mg tablet Take 200 mg by mouth.  nitrofurantoin (MACRODANTIN) 100 mg capsule Take 100 mg by mouth nightly.  triamcinolone acetonide (KENALOG) 0.1 % topical cream Apply  to affected area two (2) times daily as needed for Skin Irritation. use thin layer      cholecalciferol (VITAMIN D3) 1,000 unit cap daily. 1    aspirin delayed-release 81 mg tablet Take 81 mg by mouth daily.  atorvastatin (LIPITOR) 40 mg tablet Take 40 mg by mouth daily.  polyethylene glycol (Miralax) 17 gram/dose powder Take 17 g by mouth daily. 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       Past Surgical History:  Past Surgical History:   Procedure Laterality Date    HX CHOLECYSTECTOMY      HX HEENT      cataract    HX HYSTERECTOMY      HX MYOMECTOMY      HX ORTHOPAEDIC      ID CARDIAC SURG PROCEDURE UNLIST  1995    Stent Placement       Family History:  Family History   Problem Relation Age of Onset    Diabetes Other     Hypertension Other     Stroke Daughter     Other Daughter         sarcoidosis    Other Grandchild         sarcoidosis       Social History:  Social History     Tobacco Use    Smoking status: Never Smoker    Smokeless tobacco: Never Used   Substance Use Topics    Alcohol use: No    Drug use: No       Allergies: Allergies   Allergen Reactions    Coumadin [Warfarin] Hives    Penicillanic Sulfone Bl Beta-Lactamase Inhibitors Rash         Review of Systems   Review of Systems  Constitutional: Negative for fever, chills, and fatigue.    HENT: Negative for congestion, sore throat, rhinorrhea, sneezing and neck stiffness   Eyes: Negative for discharge and redness. Respiratory: Negative for  shortness of breath, wheezing   Cardiovascular: Negative for chest pain, palpitations   Gastrointestinal: Negative for nausea, vomiting, abdominal pain, constipation, diarrhea and blood in stool. Genitourinary: Negative for dysuria, hematuria, flank pain, decreased urine volume, discharge,   Musculoskeletal: Negative for myalgias or joint pain . Skin: Negative for rash or lesions . Neurological: Negative weakness, light-headedness, numbness and headaches. Physical Exam   Physical Exam    GENERAL: alert and oriented, no acute distress  EYES: PEERL, No injection, discharge or icterus. ENT: Mucous membranes pink and moist.  NECK: Supple  LUNGS: Airway patent. Non-labored respirations. Breath sounds clear with good air entry bilaterally. HEART: Regular rate and rhythm. 2+ pitting edema bilateral lower extremities  ABDOMEN: Non-distended and non-tender, without guarding or rebound. SKIN:  warm, dry  MSK/EXTREMITIES: Without  Deformity, erythema, warmth, symmetric with normal ROM. NEUROLOGICAL: Alert, oriented      Diagnostic Study Results     Labs -  Reviewed and interpreted by me    Radiologic Studies -   No orders to display             Medical Decision Making     ICaren MD am the first provider for this patient and am the attending of record for this patient encounter. I reviewed the vital signs, available nursing notes, past medical history, past surgical history, family history and social history. Vital Signs-Reviewed the patient's vital signs. No data found. Pulse Oximetry Analysis - 99% on RA    Records Reviewed: Nursing Notes and Old Medical Records    Provider Notes (Medical Decision Making):    On presentation, the patient is well appearing, in no acute distress noted be hypertensive on arrival..  Based on my history and exam the differential diagnosis for this patient includes CHF exacerbation, cellulitis, erysipelas, renal failure. Patient abdomen soft, benign and nontender so no reason to suspect any surgical intra-abdominal pathology. Patient's labs notable for underlying kidney disease although does seem to be somewhat improved from labs when compared to prior ED visit. Patient potassium is within normal limits. Patient was given 80 mg of IV Lasix with significant diuresis. Otherwise felt stable for discharge and continued outpatient management at this time. ED Course:   Initial assessment performed. The patients presenting problems have been discussed, and they are in agreement with the care plan formulated and outlined with them. I have encouraged them to ask questions as they arise throughout their visit. Medications   furosemide (LASIX) injection 80 mg (80 mg IntraVENous Given 10/24/21 1156)         PROGRESS  Iza Flowers's  results have been reviewed with her. She has been counseled regarding her diagnosis. She verbally conveys understanding and agreement of the signs, symptoms, diagnosis, treatment and prognosis and additionally agrees to follow up as recommended with Dr. Javon Duarte MD in 24 - 48 hours. She also agrees with the care-plan and conveys that all of her questions have been answered. I have also put together some discharge instructions for her that include: 1) educational information regarding their diagnosis, 2) how to care for their diagnosis at home, as well a 3) list of reasons why they would want to return to the ED prior to their follow-up appointment, should their condition change. Disposition:  home    PLAN:  1. Discharge Medication List as of 10/24/2021 11:52 AM        2.    Follow-up Information     Follow up With Specialties Details Why Ladi Peacock MD Internal Medicine Schedule an appointment as soon as possible for a visit in 2 days  [facility] [place] [address]  [phone]      [facility] EMERGENCY DEPT Emergency Medicine  If symptoms worsen [address] Rodríguez  [phone]        Return to ED if worse     Diagnosis     Clinical Impression:   1. Acute on chronic congestive heart failure, unspecified heart failure type ([address]    2. Chronic kidney disease, unspecified CKD stage        Please note that this dictation was completed with Dragon, computer voice recognition software. Quite often unanticipated grammatical, syntax, homophones, and other interpretive errors are inadvertently transcribed by the computer software. Please disregard these errors. Additionally, please excuse any errors that have escaped final proofreading.

## 2021-11-21 ENCOUNTER — APPOINTMENT (OUTPATIENT)
Dept: GENERAL RADIOLOGY | Age: 86
End: 2021-11-21
Attending: NURSE PRACTITIONER
Payer: MEDICARE

## 2021-11-21 ENCOUNTER — HOSPITAL ENCOUNTER (EMERGENCY)
Age: 86
Discharge: HOME | End: 2021-11-21
Attending: EMERGENCY MEDICINE
Payer: MEDICARE

## 2021-11-21 VITALS
WEIGHT: 239.42 LBS | DIASTOLIC BLOOD PRESSURE: 61 MMHG | BODY MASS INDEX: 47 KG/M2 | RESPIRATION RATE: 17 BRPM | SYSTOLIC BLOOD PRESSURE: 153 MMHG | OXYGEN SATURATION: 98 % | TEMPERATURE: 98 F | HEART RATE: 68 BPM | HEIGHT: 60 IN

## 2021-11-21 DIAGNOSIS — I50.9 ACUTE ON CHRONIC CONGESTIVE HEART FAILURE, UNSPECIFIED HEART FAILURE TYPE (HCC): Primary | ICD-10-CM

## 2021-11-21 DIAGNOSIS — R60.9 PERIPHERAL EDEMA: ICD-10-CM

## 2021-11-21 DIAGNOSIS — N18.9 CHRONIC KIDNEY DISEASE, UNSPECIFIED CKD STAGE: ICD-10-CM

## 2021-11-21 LAB
ALBUMIN SERPL-MCNC: 3.6 G/DL (ref 3.5–5)
ALBUMIN/GLOB SERPL: 0.9 {RATIO} (ref 1.1–2.2)
ALP SERPL-CCNC: 67 U/L (ref 45–117)
ALT SERPL-CCNC: 24 U/L (ref 12–78)
ANION GAP SERPL CALC-SCNC: 8 MMOL/L (ref 5–15)
AST SERPL-CCNC: 33 U/L (ref 15–37)
BASOPHILS # BLD: 0 K/UL (ref 0–0.1)
BASOPHILS NFR BLD: 1 % (ref 0–1)
BILIRUB SERPL-MCNC: 0.7 MG/DL (ref 0.2–1)
BNP SERPL-MCNC: 3484 PG/ML (ref 0–450)
BUN SERPL-MCNC: 18 MG/DL (ref 6–20)
BUN/CREAT SERPL: 8 (ref 12–20)
CALCIUM SERPL-MCNC: 8.9 MG/DL (ref 8.5–10.1)
CHLORIDE SERPL-SCNC: 101 MMOL/L (ref 97–108)
CO2 SERPL-SCNC: 31 MMOL/L (ref 21–32)
CREAT SERPL-MCNC: 2.31 MG/DL (ref 0.55–1.02)
DIFFERENTIAL METHOD BLD: ABNORMAL
EOSINOPHIL # BLD: 0.1 K/UL (ref 0–0.4)
EOSINOPHIL NFR BLD: 1 % (ref 0–7)
ERYTHROCYTE [DISTWIDTH] IN BLOOD BY AUTOMATED COUNT: 16.2 % (ref 11.5–14.5)
GLOBULIN SER CALC-MCNC: 4.1 G/DL (ref 2–4)
GLUCOSE SERPL-MCNC: 86 MG/DL (ref 65–100)
HCT VFR BLD AUTO: 30.9 % (ref 35–47)
HGB BLD-MCNC: 9.5 G/DL (ref 11.5–16)
IMM GRANULOCYTES # BLD AUTO: 0 K/UL (ref 0–0.04)
IMM GRANULOCYTES NFR BLD AUTO: 0 % (ref 0–0.5)
LYMPHOCYTES # BLD: 1.6 K/UL (ref 0.8–3.5)
LYMPHOCYTES NFR BLD: 33 % (ref 12–49)
MCH RBC QN AUTO: 29.6 PG (ref 26–34)
MCHC RBC AUTO-ENTMCNC: 30.7 G/DL (ref 30–36.5)
MCV RBC AUTO: 96.3 FL (ref 80–99)
MONOCYTES # BLD: 0.7 K/UL (ref 0–1)
MONOCYTES NFR BLD: 14 % (ref 5–13)
NEUTS SEG # BLD: 2.4 K/UL (ref 1.8–8)
NEUTS SEG NFR BLD: 51 % (ref 32–75)
NRBC # BLD: 0 K/UL (ref 0–0.01)
NRBC BLD-RTO: 0 PER 100 WBC
PLATELET # BLD AUTO: 187 K/UL (ref 150–400)
PMV BLD AUTO: 10.4 FL (ref 8.9–12.9)
POTASSIUM SERPL-SCNC: 3.6 MMOL/L (ref 3.5–5.1)
PROT SERPL-MCNC: 7.7 G/DL (ref 6.4–8.2)
RBC # BLD AUTO: 3.21 M/UL (ref 3.8–5.2)
SODIUM SERPL-SCNC: 140 MMOL/L (ref 136–145)
WBC # BLD AUTO: 4.8 K/UL (ref 3.6–11)

## 2021-11-21 PROCEDURE — 85025 COMPLETE CBC W/AUTO DIFF WBC: CPT

## 2021-11-21 PROCEDURE — 36415 COLL VENOUS BLD VENIPUNCTURE: CPT

## 2021-11-21 PROCEDURE — 80053 COMPREHEN METABOLIC PANEL: CPT

## 2021-11-21 PROCEDURE — 71045 X-RAY EXAM CHEST 1 VIEW: CPT

## 2021-11-21 PROCEDURE — 99283 EMERGENCY DEPT VISIT LOW MDM: CPT

## 2021-11-21 PROCEDURE — 74011250636 HC RX REV CODE- 250/636: Performed by: NURSE PRACTITIONER

## 2021-11-21 PROCEDURE — 96374 THER/PROPH/DIAG INJ IV PUSH: CPT

## 2021-11-21 PROCEDURE — 83880 ASSAY OF NATRIURETIC PEPTIDE: CPT

## 2021-11-21 RX ORDER — FUROSEMIDE 10 MG/ML
80 INJECTION INTRAMUSCULAR; INTRAVENOUS
Status: COMPLETED | OUTPATIENT
Start: 2021-11-21 | End: 2021-11-21

## 2021-11-21 RX ADMIN — FUROSEMIDE 80 MG: 10 INJECTION, SOLUTION INTRAVENOUS at 10:39

## 2021-11-21 NOTE — ED PROVIDER NOTES
EMERGENCY DEPARTMENT HISTORY AND PHYSICAL EXAM      Date: 11/21/2021  Patient Name: Mackenzie Preston    History of Presenting Illness     Chief Complaint   Patient presents with    Foot Swelling    Leg Swelling       History Provided By: Patient    Additional History (Context): Mackenzie Preston is a 80 y.o. female with HTN, diabetes, arthritis, CAD, CHF who presents with foot swelling and leg swelling. Patient is present with her daughter who helps care for her. Patient is under the care of Dr. Fariha Draper at [address] [place] [address]. She reports symptoms began yesterday. States she has leg swelling at baseline but appears to be 50% more swollen. She also was having shortness of breath in addition to abdominal swelling. Patient does not check her weight so she is unsure if there is weight gain but her daughter believes this is possible. Denies cough, chest pain, dizziness, numbness, tingling. Daughter states patient has these mild exacerbations which causes her to come to the ER to get a one-time dose of Lasix 80 mg IV. She reports last dose 1 month ago. States at that time she followed up with her cardiologist and the plan is to have patient receive a port to receive Lasix infusions outpatient. PCP: Sima Ponce MD    Current Outpatient Medications   Medication Sig Dispense Refill    polyethylene glycol (Miralax) 17 gram/dose powder Take 17 g by mouth daily. 1 tablespoon with 8 oz of water daily 595 g 0    bumetanide (BUMEX) 2 mg tablet Take 1 Tablet by mouth daily. 30 Tablet 0    apixaban (ELIQUIS) 2.5 mg tablet 2.5 mg = 1 tab each dose, PO, every 12 hours, # 180 tab, 3 Refills, Pharmacy: [facility] #[id number]      nystatin (MYCOSTATIN) powder Apply  to affected area four (4) times daily.       hydrALAZINE (APRESOLINE) 50 mg tablet 50 mg = 1 tab each dose, PO, tid, # 90 tab, 5 Refills, Pharmacy: [facility] #[id number]      dolutegravir-rilpivirine (JULUCA) 50-25 mg tab tablet = 1 tab each dose, PO, daily, with food, # 30 tab, 5 Refills, Pharmacy: [facility] #[id number]      amiodarone (CORDARONE) 200 mg tablet Take 200 mg by mouth.  nitrofurantoin (MACRODANTIN) 100 mg capsule Take 100 mg by mouth nightly.  triamcinolone acetonide (KENALOG) 0.1 % topical cream Apply  to affected area two (2) times daily as needed for Skin Irritation. use thin layer      cholecalciferol (VITAMIN D3) 1,000 unit cap daily. 1    aspirin delayed-release 81 mg tablet Take 81 mg by mouth daily.  atorvastatin (LIPITOR) 40 mg tablet Take 40 mg by mouth daily. 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       Past Surgical History:  Past Surgical History:   Procedure Laterality Date    HX CHOLECYSTECTOMY      HX HEENT      cataract    HX HYSTERECTOMY      HX MYOMECTOMY      HX ORTHOPAEDIC      [place] CARDIAC SURG PROCEDURE UNLIST  1995    Stent Placement       Family History:  Family History   Problem Relation Age of Onset    Diabetes Other     Hypertension Other     Stroke Daughter     Other Daughter         sarcoidosis    Other Grandchild         sarcoidosis       Social History:  Social History     Tobacco Use    Smoking status: Never Smoker    Smokeless tobacco: Never Used   Substance Use Topics    Alcohol use: No    Drug use: No       Allergies: Allergies   Allergen Reactions    Coumadin [Warfarin] Hives    Penicillanic Sulfone Bl Beta-Lactamase Inhibitors Rash         Review of Systems   Review of Systems   Constitutional: Negative for appetite change, chills, fatigue and fever. HENT: Negative for congestion, ear pain and rhinorrhea. Eyes: Negative for pain and itching. Respiratory: Positive for shortness of breath. Negative for cough, chest tightness and wheezing. Cardiovascular: Positive for leg swelling. Negative for chest pain and palpitations. Gastrointestinal: Positive for abdominal distention. Negative for abdominal pain, nausea and vomiting. Genitourinary: Negative for dysuria, flank pain, frequency, hematuria and urgency. Musculoskeletal: Negative for arthralgias and back pain. Skin: Negative for color change and rash. Neurological: Negative for dizziness, numbness and headaches. All other systems reviewed and are negative. Physical Exam     Vitals:    11/21/21 0857   BP: (!) 140/55   Pulse: 62   Resp: 22   Temp: 98.1 °F (36.7 °C)   SpO2: 96%   Height: 5' (1.524 m)     Physical Exam  Vitals and nursing note reviewed. Constitutional:       General: She is not in acute distress. Appearance: She is well-developed. She is obese. She is not ill-appearing, toxic-appearing or diaphoretic. HENT:      Head: Normocephalic and atraumatic. Right Ear: Tympanic membrane and ear canal normal.      Left Ear: Tympanic membrane and ear canal normal.      Nose: Nose normal.      Mouth/Throat:      Mouth: Mucous membranes are moist.      Pharynx: Oropharynx is clear. No oropharyngeal exudate or posterior oropharyngeal erythema. Eyes:      Extraocular Movements: Extraocular movements intact. Conjunctiva/sclera: Conjunctivae normal.      Pupils: Pupils are equal, round, and reactive to light. Cardiovascular:      Rate and Rhythm: Normal rate and regular rhythm. Pulses: Normal pulses. Heart sounds: Normal heart sounds. Pulmonary:      Effort: Pulmonary effort is normal.      Breath sounds: Normal breath sounds. Abdominal:      General: There is distension. Palpations: Abdomen is soft. Tenderness: There is no abdominal tenderness. There is no right CVA tenderness, left CVA tenderness, guarding or rebound. Musculoskeletal:      Cervical back: Normal range of motion and neck supple.       Right lower leg: 3+ Pitting Edema present. Left lower leg: 3+ Pitting Edema present. Skin:     General: Skin is warm and dry. Neurological:      Mental Status: She is alert and oriented to person, place, and time. Diagnostic Study Results     Labs -     Recent Results (from the past 12 hour(s))   METABOLIC PANEL, COMPREHENSIVE    Collection Time: 11/21/21  9:16 AM   Result Value Ref Range    Sodium 140 136 - 145 mmol/L    Potassium 3.6 3.5 - 5.1 mmol/L    Chloride 101 97 - 108 mmol/L    CO2 31 21 - 32 mmol/L    Anion gap 8 5 - 15 mmol/L    Glucose 86 65 - 100 mg/dL    BUN 18 6 - 20 MG/DL    Creatinine 2.31 (H) 0.55 - 1.02 MG/DL    BUN/Creatinine ratio 8 (L) 12 - 20      GFR est AA 24 (L) >60 ml/min/1.73m2    GFR est non-AA 20 (L) >60 ml/min/1.73m2    Calcium 8.9 8.5 - 10.1 MG/DL    Bilirubin, total 0.7 0.2 - 1.0 MG/DL    ALT (SGPT) 24 12 - 78 U/L    AST (SGOT) 33 15 - 37 U/L    Alk. phosphatase 67 45 - 117 U/L    Protein, total 7.7 6.4 - 8.2 g/dL    Albumin 3.6 3.5 - 5.0 g/dL    Globulin 4.1 (H) 2.0 - 4.0 g/dL    A-G Ratio 0.9 (L) 1.1 - 2.2     CBC WITH AUTOMATED DIFF    Collection Time: 11/21/21  9:16 AM   Result Value Ref Range    WBC 4.8 3.6 - 11.0 K/uL    RBC 3.21 (L) 3.80 - 5.20 M/uL    HGB 9.5 (L) 11.5 - 16.0 g/dL    HCT 30.9 (L) 35.0 - 47.0 %    MCV 96.3 80.0 - 99.0 FL    MCH 29.6 26.0 - 34.0 PG    MCHC 30.7 30.0 - 36.5 g/dL    RDW 16.2 (H) 11.5 - 14.5 %    PLATELET 545 504 - 250 K/uL    MPV 10.4 8.9 - 12.9 FL    NRBC 0.0 0  WBC    ABSOLUTE NRBC 0.00 0.00 - 0.01 K/uL    NEUTROPHILS 51 32 - 75 %    LYMPHOCYTES 33 12 - 49 %    MONOCYTES 14 (H) 5 - 13 %    EOSINOPHILS 1 0 - 7 %    BASOPHILS 1 0 - 1 %    IMMATURE GRANULOCYTES 0 0.0 - 0.5 %    ABS. NEUTROPHILS 2.4 1.8 - 8.0 K/UL    ABS. LYMPHOCYTES 1.6 0.8 - 3.5 K/UL    ABS. MONOCYTES 0.7 0.0 - 1.0 K/UL    ABS. EOSINOPHILS 0.1 0.0 - 0.4 K/UL    ABS. BASOPHILS 0.0 0.0 - 0.1 K/UL    ABS. IMM.  GRANS. 0.0 0.00 - 0.04 K/UL    DF AUTOMATED     NT-PRO BNP    Collection Time: 11/21/21  9:16 AM   Result Value Ref Range    NT pro-BNP 3,484 (H) 0 - 450 PG/ML       Radiologic Studies -   XR CHEST PORT   Final Result        CT Results  (Last 48 hours)    None        CXR Results  (Last 48 hours)               11/21/21 0938  XR CHEST PORT Final result    Impression:  No acute process. Narrative: Indication: Shortness of breath, history of congestive heart failure       Comparison: 10/28/2021       Portable exam of the chest obtained at 931 demonstrates stable cardiomegaly. There is no acute process in the lung fields. The osseous structures are   unremarkable. Medical Decision Making   I am the first provider for this patient. I reviewed the vital signs, available nursing notes, past medical history, past surgical history, family history and social history. Vital Signs-Reviewed the patient's vital signs. Records Reviewed: Nursing Notes, Old Medical Records, Previous Radiology Studies and Previous Laboratory Studies    60-year-old female presenting for foot swelling and leg swelling with history of CHF. Lungs are clear bilaterally and no signs of rales or rhonchi. Patient also shows no signs of respiratory distress. She does have 3+ pitting bilateral edema which daughter states is mildly above baseline. Plan to obtain labs in addition chest x-ray prior to Lasix administration. If unremarkable will DC home with cardiology follow-up outpatient. Differential diagnoses include peripheral edema, CHF exacerbation, acute renal failure, electrolyte imbalance, pleural effusions, cardiomyopathy      ED Course:   ED Course as of 11/21/21 1059   Sun Nov 21, 2021   1030 Progress Note:   Mild elevation in BNP but lower than previous level 1 month ago. Crea 2.31 but at baseline. CXR show stable cardiomegaly but no pleural effusions. Potassium WNL.    Plan to treat with Lasix 80 mg and have pt follow up with her cardiologist. Rajendra Goodwin to resume dose of bumex tomorrow [NA]      ED Course User Index  [NA] Alia Hawkins NP         Disposition:  Discharge     DISCHARGE NOTE:     Pt has been reexamined. Patient has no new complaints, changes, or physical findings. Care plan outlined and precautions discussed. All of pt's questions and concerns were addressed. Patient was instructed and agrees to follow up with PCP, as well as to return to the ED upon further deterioration. Patient is ready to go home. Follow-up Information     Follow up With Specialties Details Why Cate Avendano MD Internal Medicine Schedule an appointment as soon as possible for a visit   [facility] [address]  [phone]            Current Discharge Medication List          Provider Notes (Medical Decision Making):         Diagnosis     Clinical Impression:   1. Acute on chronic congestive heart failure, unspecified heart failure type ([address]    2. Peripheral edema    3.  Chronic kidney disease, unspecified CKD stage

## 2021-11-21 NOTE — ED TRIAGE NOTES
Per family member, the patient comes to the ED for \"a Lasix infusion\". Stated the patient is to have a \"port placed\" for weekly Lasix infusions.  To see PCP this week

## 2021-11-21 NOTE — DISCHARGE INSTRUCTIONS
It was a pleasure taking care of you at [facility] [address] Emergency Department today. We know that when you come to [facility], you are entrusting us with your health, comfort, and safety. Our physicians and nurses honor that trust, and we truly appreciate the opportunity to care for you and your loved ones. We also value our feedback. If you receive a survey about your Emergency Department experience today, please fill it out. We care about our patients' feedback, and we listen to what you have to say. Thank you!

## 2021-11-21 NOTE — ED NOTES
Pt discharged from the ED in the care of daughter in wheelchair. Pt and daughter given instructions. Pt appears stable, AOx4, calm and cooperative.

## 2021-12-02 ENCOUNTER — HOSPITAL ENCOUNTER (EMERGENCY)
Age: 86
Discharge: HOME | End: 2021-12-02
Attending: EMERGENCY MEDICINE
Payer: MEDICARE

## 2021-12-02 VITALS
OXYGEN SATURATION: 99 % | DIASTOLIC BLOOD PRESSURE: 98 MMHG | HEIGHT: 65 IN | HEART RATE: 60 BPM | WEIGHT: 212 LBS | SYSTOLIC BLOOD PRESSURE: 115 MMHG | BODY MASS INDEX: 35.32 KG/M2 | RESPIRATION RATE: 14 BRPM

## 2021-12-02 DIAGNOSIS — I50.9 ACUTE ON CHRONIC CONGESTIVE HEART FAILURE, UNSPECIFIED HEART FAILURE TYPE (HCC): Primary | ICD-10-CM

## 2021-12-02 LAB
ALBUMIN SERPL-MCNC: 3.5 G/DL (ref 3.5–5)
ALBUMIN/GLOB SERPL: 0.9 {RATIO} (ref 1.1–2.2)
ALP SERPL-CCNC: 68 U/L (ref 45–117)
ALT SERPL-CCNC: 23 U/L (ref 12–78)
ANION GAP SERPL CALC-SCNC: 7 MMOL/L (ref 5–15)
AST SERPL-CCNC: 33 U/L (ref 15–37)
BASOPHILS # BLD: 0 K/UL (ref 0–0.1)
BASOPHILS NFR BLD: 1 % (ref 0–1)
BILIRUB SERPL-MCNC: 0.6 MG/DL (ref 0.2–1)
BNP SERPL-MCNC: 4024 PG/ML (ref 0–450)
BUN SERPL-MCNC: 23 MG/DL (ref 6–20)
BUN/CREAT SERPL: 10 (ref 12–20)
CALCIUM SERPL-MCNC: 8.6 MG/DL (ref 8.5–10.1)
CHLORIDE SERPL-SCNC: 103 MMOL/L (ref 97–108)
CO2 SERPL-SCNC: 29 MMOL/L (ref 21–32)
CREAT SERPL-MCNC: 2.37 MG/DL (ref 0.55–1.02)
DIFFERENTIAL METHOD BLD: ABNORMAL
EOSINOPHIL # BLD: 0.1 K/UL (ref 0–0.4)
EOSINOPHIL NFR BLD: 2 % (ref 0–7)
ERYTHROCYTE [DISTWIDTH] IN BLOOD BY AUTOMATED COUNT: 15.9 % (ref 11.5–14.5)
GLOBULIN SER CALC-MCNC: 3.9 G/DL (ref 2–4)
GLUCOSE SERPL-MCNC: 100 MG/DL (ref 65–100)
HCT VFR BLD AUTO: 28.1 % (ref 35–47)
HGB BLD-MCNC: 8.5 G/DL (ref 11.5–16)
IMM GRANULOCYTES # BLD AUTO: 0 K/UL (ref 0–0.04)
IMM GRANULOCYTES NFR BLD AUTO: 0 % (ref 0–0.5)
LYMPHOCYTES # BLD: 1.2 K/UL (ref 0.8–3.5)
LYMPHOCYTES NFR BLD: 34 % (ref 12–49)
MAGNESIUM SERPL-MCNC: 2.4 MG/DL (ref 1.6–2.4)
MCH RBC QN AUTO: 29.1 PG (ref 26–34)
MCHC RBC AUTO-ENTMCNC: 30.2 G/DL (ref 30–36.5)
MCV RBC AUTO: 96.2 FL (ref 80–99)
MONOCYTES # BLD: 0.6 K/UL (ref 0–1)
MONOCYTES NFR BLD: 17 % (ref 5–13)
NEUTS SEG # BLD: 1.7 K/UL (ref 1.8–8)
NEUTS SEG NFR BLD: 46 % (ref 32–75)
NRBC # BLD: 0 K/UL (ref 0–0.01)
NRBC BLD-RTO: 0 PER 100 WBC
PLATELET # BLD AUTO: 155 K/UL (ref 150–400)
PMV BLD AUTO: 10.6 FL (ref 8.9–12.9)
POTASSIUM SERPL-SCNC: 3.4 MMOL/L (ref 3.5–5.1)
PROT SERPL-MCNC: 7.4 G/DL (ref 6.4–8.2)
RBC # BLD AUTO: 2.92 M/UL (ref 3.8–5.2)
SODIUM SERPL-SCNC: 139 MMOL/L (ref 136–145)
TROPONIN-HIGH SENSITIVITY: 25 NG/L (ref 0–51)
WBC # BLD AUTO: 3.6 K/UL (ref 3.6–11)

## 2021-12-02 PROCEDURE — 83880 ASSAY OF NATRIURETIC PEPTIDE: CPT

## 2021-12-02 PROCEDURE — 81001 URINALYSIS AUTO W/SCOPE: CPT

## 2021-12-02 PROCEDURE — 85025 COMPLETE CBC W/AUTO DIFF WBC: CPT

## 2021-12-02 PROCEDURE — 80053 COMPREHEN METABOLIC PANEL: CPT

## 2021-12-02 PROCEDURE — 99283 EMERGENCY DEPT VISIT LOW MDM: CPT

## 2021-12-02 PROCEDURE — 96374 THER/PROPH/DIAG INJ IV PUSH: CPT

## 2021-12-02 PROCEDURE — 74011250637 HC RX REV CODE- 250/637: Performed by: EMERGENCY MEDICINE

## 2021-12-02 PROCEDURE — 83735 ASSAY OF MAGNESIUM: CPT

## 2021-12-02 PROCEDURE — 74011250636 HC RX REV CODE- 250/636: Performed by: EMERGENCY MEDICINE

## 2021-12-02 PROCEDURE — 84484 ASSAY OF TROPONIN QUANT: CPT

## 2021-12-02 PROCEDURE — 36415 COLL VENOUS BLD VENIPUNCTURE: CPT

## 2021-12-02 RX ORDER — FUROSEMIDE 10 MG/ML
80 INJECTION INTRAMUSCULAR; INTRAVENOUS
Status: COMPLETED | OUTPATIENT
Start: 2021-12-02 | End: 2021-12-02

## 2021-12-02 RX ORDER — POTASSIUM CHLORIDE 750 MG/1
40 TABLET, FILM COATED, EXTENDED RELEASE ORAL
Status: COMPLETED | OUTPATIENT
Start: 2021-12-02 | End: 2021-12-02

## 2021-12-02 RX ADMIN — POTASSIUM CHLORIDE 40 MEQ: 750 TABLET, EXTENDED RELEASE ORAL at 23:40

## 2021-12-02 RX ADMIN — FUROSEMIDE 80 MG: 10 INJECTION, SOLUTION INTRAVENOUS at 21:39

## 2021-12-03 LAB
APPEARANCE UR: CLEAR
BACTERIA URNS QL MICRO: ABNORMAL /HPF
BILIRUB UR QL: NEGATIVE
COLOR UR: ABNORMAL
EPITH CASTS URNS QL MICRO: ABNORMAL /LPF
GLUCOSE UR STRIP.AUTO-MCNC: NEGATIVE MG/DL
HGB UR QL STRIP: NEGATIVE
KETONES UR QL STRIP.AUTO: NEGATIVE MG/DL
LEUKOCYTE ESTERASE UR QL STRIP.AUTO: ABNORMAL
NITRITE UR QL STRIP.AUTO: NEGATIVE
OTHER,OTHU: ABNORMAL
PH UR STRIP: 7 [PH] (ref 5–8)
PROT UR STRIP-MCNC: NEGATIVE MG/DL
RBC #/AREA URNS HPF: ABNORMAL /HPF (ref 0–5)
SP GR UR REFRACTOMETRY: <1.005 (ref 1–1.03)
UROBILINOGEN UR QL STRIP.AUTO: 0.2 EU/DL (ref 0.2–1)
WBC URNS QL MICRO: ABNORMAL /HPF (ref 0–4)

## 2021-12-03 NOTE — ED TRIAGE NOTES
Patient presents to the ED with c/o having excess fluid in her legs. Family member at bedside stated patient usually comes here to get lasix. Stated her PCP Is placing a port in on Tuesday to get lasix but they wanted the fluid off sooner. Pt denies pain. Reports occasional SOB. Denies chest pain or cough.

## 2021-12-03 NOTE — ED PROVIDER NOTES
60-year-old female with a history of arthritis, coronary artery disease status post stents, diabetes, hypertension, CHF presents with leg pain, pedal edema, some subjective abdominal distention and abdominal pressure. Patient's daughter, who cares for her, explains that this is a chronic issue for patient. She has longstanding CHF and oral Lasix works somewhat, but \"the fluid catches up with her. \"  She is under the care of Dr. Ennis Councilman, cardiologist at [facility], and she is supposed to get a port this Tuesday so that she can have IV Lasix as an outpatient. Daughter explains that when patient begins to swell she comes the ED and gets IV Lasix which helps. This episode began earlier today. Daughter describes mild dyspnea with exertion. No chest pain, no fevers, no URI symptoms, no leg wounds or ulcers.            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       Past Surgical History:   Procedure Laterality Date    HX CHOLECYSTECTOMY      HX HEENT      cataract    HX HYSTERECTOMY      HX MYOMECTOMY      HX ORTHOPAEDIC      [place] CARDIAC SURG PROCEDURE UNLIST  1995    Stent Placement         Family History:   Problem Relation Age of Onset    Diabetes Other     Hypertension Other     Stroke Daughter     Other Daughter         sarcoidosis    Other Grandchild         sarcoidosis       Social History     Socioeconomic History    Marital status:      Spouse name: Not on file    Number of children: Not on file    Years of education: Not on file    Highest education level: Not on file   Occupational History    Not on file   Tobacco Use    Smoking status: Never Smoker    Smokeless tobacco: Never Used   Substance and Sexual Activity    Alcohol use: No    Drug use: No    Sexual activity: Not Currently   Other Topics Concern    Not on file   Social History Narrative    Conversation 12/26/16:    She is inclined to be DNR but she would like be full code until she discusses it with her family. Her POA is Frosty Back [phone]. (Daughter )        She lives alone, her son visits to assist, but she does all ADLs    She deserves six surviving children no history of alcohol cigarette use. Social Determinants of Health     Financial Resource Strain:     Difficulty of Paying Living Expenses: Not on file   Food Insecurity:     Worried About Running Out of Food in the Last Year: Not on file    Divine of Food in the Last Year: Not on file   Transportation Needs:     Lack of Transportation (Medical): Not on file    Lack of Transportation (Non-Medical): Not on file   Physical Activity:     Days of Exercise per Week: Not on file    Minutes of Exercise per Session: Not on file   Stress:     Feeling of Stress : Not on file   Social Connections:     Frequency of Communication with Friends and Family: Not on file    Frequency of Social Gatherings with Friends and Family: Not on file    Attends Zoroastrianism Services: Not on file    Active Member of [address] [place] [address] [employer] or Organizations: Not on file    Attends Club or Organization Meetings: Not on file    Marital Status: Not on file   Intimate Partner Violence:     Fear of Current or Ex-Partner: Not on file    Emotionally Abused: Not on file    Physically Abused: Not on file    Sexually Abused: Not on file   Housing Stability:     Unable to Pay for Housing in the Last Year: Not on file    Number of [place] in the Last Year: Not on file    Unstable Housing in the Last Year: Not on file         ALLERGIES: Coumadin [warfarin] and Penicillanic sulfone bl beta-lactamase inhibitors    Review of Systems   Constitutional: Negative. Negative for chills, fever and unexpected weight change. HENT: Negative. Negative for congestion and trouble swallowing. Eyes: Negative for discharge. Respiratory: Positive for shortness of breath. Negative for cough and chest tightness. Cardiovascular: Positive for leg swelling. Negative for chest pain. Gastrointestinal: Positive for abdominal distention and abdominal pain. Negative for constipation, diarrhea and nausea. Endocrine: Negative. Genitourinary: Negative. Negative for difficulty urinating, dysuria, frequency and urgency. Musculoskeletal: Positive for myalgias. Negative for arthralgias. Skin: Negative. Negative for color change. Allergic/Immunologic: Negative. Neurological: Negative. Negative for dizziness, speech difficulty and headaches. Hematological: Negative. Psychiatric/Behavioral: Negative. Negative for agitation and confusion. All other systems reviewed and are negative. Vitals:    12/02/21 2009   BP: (!) 144/63   Pulse: 61   Resp: 14   SpO2: 99%   Weight: 96.2 kg (212 lb)   Height: 5' 5\" (1.651 m)            Physical Exam  Vitals and nursing note reviewed. Constitutional:       Appearance: She is well-developed. HENT:      Head: Normocephalic and atraumatic. Eyes:      Conjunctiva/sclera: Conjunctivae normal.   Cardiovascular:      Rate and Rhythm: Normal rate and regular rhythm. Pulmonary:      Effort: Pulmonary effort is normal. No respiratory distress. Breath sounds: Normal breath sounds. No rales. Abdominal:      Palpations: Abdomen is soft. Tenderness: There is no abdominal tenderness. Musculoskeletal:         General: No deformity. Normal range of motion. Cervical back: Neck supple. Right lower leg: Edema present. Left lower leg: Edema present. Skin:     General: Skin is warm and dry. Neurological:      Mental Status: She is alert and oriented to person, place, and time. Psychiatric:         Behavior: Behavior normal.         Thought Content:  Thought content normal.          [facility]  ED Course as of 12/07/21 1554   Thu Dec 02, 2021   2221 Results discussed with patient. She would like to stay in the ED until she finishes diuresing from the IV Lasix. Daughter explains that she will likely urinate in the car on the way home if she is discharged now [SS]   2336 Now asking for d/c papers [SS]      ED Course User Index  [SS] Ruthie Pyle MD       Procedures    LABORATORY TESTS:  No results found for this or any previous visit (from the past 12 hour(s)). IMAGING RESULTS:  No orders to display       MEDICATIONS GIVEN:  Medications   furosemide (LASIX) injection 80 mg (80 mg IntraVENous Given 12/2/21 2139)   potassium chloride SR (KLOR-CON 10) tablet 40 mEq (40 mEq Oral Given 12/2/21 2340)       IMPRESSION:  1. Acute on chronic congestive heart failure, unspecified heart failure type ([facility]        PLAN:  1. Discharge Medication List as of 12/2/2021 11:37 PM        2.    Follow-up Information     Follow up With Specialties Details Why Contact Info    Juliette Nicholson MD Internal Medicine   [facility] [address]  [phone]      [facility] EMERGENCY DEPT Emergency Medicine  As needed, If symptoms worsen [address] [facility]  [phone]        Return to ED if worse

## 2021-12-03 NOTE — ED NOTES
Patient (s) daughter given copy of dc instructions and 0 paper script(s) and 0 electronic scripts. Patient (s) daughter verbalized understanding of instructions and script (s). Patient given a current medication reconciliation form and verbalized understanding of their medications. Patient (s)daughter verbalized understanding of the importance of discussing medications with  his or her physician or clinic they will be following up with. Patient alert and oriented and in no acute distress. Patient offered wheelchair from treatment area to hospital entrance, patient transported via wheelchair.

## 2021-12-03 NOTE — ED NOTES
Emergency Department Nursing Plan of Care       The Nursing Plan of Care is developed from the Nursing assessment and Emergency Department Attending provider initial evaluation. The plan of care may be reviewed in the ED Provider note.     The Plan of Care was developed with the following considerations:   Patient / Family readiness to learn indicated by:verbalized understanding  Persons(s) to be included in education: patient  Barriers to Learning/Limitations:No    Signed     Panfilo Young RN    12/2/2021   11:57 PM

## 2021-12-19 ENCOUNTER — APPOINTMENT (OUTPATIENT)
Dept: GENERAL RADIOLOGY | Age: 86
End: 2021-12-19
Attending: PHYSICIAN ASSISTANT
Payer: MEDICARE

## 2021-12-19 ENCOUNTER — HOSPITAL ENCOUNTER (EMERGENCY)
Age: 86
Discharge: HOME | End: 2021-12-19
Attending: EMERGENCY MEDICINE
Payer: MEDICARE

## 2021-12-19 VITALS
TEMPERATURE: 98.3 F | DIASTOLIC BLOOD PRESSURE: 48 MMHG | OXYGEN SATURATION: 96 % | BODY MASS INDEX: 36.61 KG/M2 | HEART RATE: 66 BPM | SYSTOLIC BLOOD PRESSURE: 130 MMHG | RESPIRATION RATE: 18 BRPM | WEIGHT: 220 LBS

## 2021-12-19 DIAGNOSIS — D64.9 ACUTE ON CHRONIC ANEMIA: ICD-10-CM

## 2021-12-19 DIAGNOSIS — I50.9 ACUTE ON CHRONIC CONGESTIVE HEART FAILURE, UNSPECIFIED HEART FAILURE TYPE (HCC): Primary | ICD-10-CM

## 2021-12-19 LAB
ALBUMIN SERPL-MCNC: 3.6 G/DL (ref 3.5–5)
ALBUMIN/GLOB SERPL: 0.9 {RATIO} (ref 1.1–2.2)
ALP SERPL-CCNC: 68 U/L (ref 45–117)
ALT SERPL-CCNC: 25 U/L (ref 12–78)
ANION GAP SERPL CALC-SCNC: 8 MMOL/L (ref 5–15)
AST SERPL-CCNC: 32 U/L (ref 15–37)
BASOPHILS # BLD: 0 K/UL (ref 0–0.1)
BASOPHILS NFR BLD: 1 % (ref 0–1)
BILIRUB SERPL-MCNC: 0.6 MG/DL (ref 0.2–1)
BNP SERPL-MCNC: 4290 PG/ML (ref 0–450)
BUN SERPL-MCNC: 34 MG/DL (ref 6–20)
BUN/CREAT SERPL: 13 (ref 12–20)
CALCIUM SERPL-MCNC: 9 MG/DL (ref 8.5–10.1)
CHLORIDE SERPL-SCNC: 103 MMOL/L (ref 97–108)
CO2 SERPL-SCNC: 30 MMOL/L (ref 21–32)
CREAT SERPL-MCNC: 2.68 MG/DL (ref 0.55–1.02)
DIFFERENTIAL METHOD BLD: ABNORMAL
EOSINOPHIL # BLD: 0.1 K/UL (ref 0–0.4)
EOSINOPHIL NFR BLD: 1 % (ref 0–7)
ERYTHROCYTE [DISTWIDTH] IN BLOOD BY AUTOMATED COUNT: 15.6 % (ref 11.5–14.5)
GLOBULIN SER CALC-MCNC: 4 G/DL (ref 2–4)
GLUCOSE SERPL-MCNC: 101 MG/DL (ref 65–100)
HCT VFR BLD AUTO: 25 % (ref 35–47)
HGB BLD-MCNC: 7.8 G/DL (ref 11.5–16)
IMM GRANULOCYTES # BLD AUTO: 0 K/UL (ref 0–0.04)
IMM GRANULOCYTES NFR BLD AUTO: 0 % (ref 0–0.5)
LYMPHOCYTES # BLD: 1.3 K/UL (ref 0.8–3.5)
LYMPHOCYTES NFR BLD: 31 % (ref 12–49)
MCH RBC QN AUTO: 29.8 PG (ref 26–34)
MCHC RBC AUTO-ENTMCNC: 31.2 G/DL (ref 30–36.5)
MCV RBC AUTO: 95.4 FL (ref 80–99)
MONOCYTES # BLD: 0.6 K/UL (ref 0–1)
MONOCYTES NFR BLD: 14 % (ref 5–13)
NEUTS SEG # BLD: 2.2 K/UL (ref 1.8–8)
NEUTS SEG NFR BLD: 53 % (ref 32–75)
NRBC # BLD: 0 K/UL (ref 0–0.01)
NRBC BLD-RTO: 0 PER 100 WBC
PLATELET # BLD AUTO: 150 K/UL (ref 150–400)
PMV BLD AUTO: 11 FL (ref 8.9–12.9)
POTASSIUM SERPL-SCNC: 3.8 MMOL/L (ref 3.5–5.1)
PROT SERPL-MCNC: 7.6 G/DL (ref 6.4–8.2)
RBC # BLD AUTO: 2.62 M/UL (ref 3.8–5.2)
SODIUM SERPL-SCNC: 141 MMOL/L (ref 136–145)
WBC # BLD AUTO: 4.1 K/UL (ref 3.6–11)

## 2021-12-19 PROCEDURE — 83880 ASSAY OF NATRIURETIC PEPTIDE: CPT

## 2021-12-19 PROCEDURE — 85025 COMPLETE CBC W/AUTO DIFF WBC: CPT

## 2021-12-19 PROCEDURE — 36415 COLL VENOUS BLD VENIPUNCTURE: CPT

## 2021-12-19 PROCEDURE — 80053 COMPREHEN METABOLIC PANEL: CPT

## 2021-12-19 PROCEDURE — 74011250636 HC RX REV CODE- 250/636: Performed by: PHYSICIAN ASSISTANT

## 2021-12-19 PROCEDURE — 71045 X-RAY EXAM CHEST 1 VIEW: CPT

## 2021-12-19 PROCEDURE — 99282 EMERGENCY DEPT VISIT SF MDM: CPT

## 2021-12-19 PROCEDURE — 96374 THER/PROPH/DIAG INJ IV PUSH: CPT

## 2021-12-19 RX ORDER — FUROSEMIDE 10 MG/ML
80 INJECTION INTRAMUSCULAR; INTRAVENOUS
Status: COMPLETED | OUTPATIENT
Start: 2021-12-19 | End: 2021-12-19

## 2021-12-19 RX ADMIN — FUROSEMIDE 80 MG: 10 INJECTION, SOLUTION INTRAVENOUS at 18:38

## 2021-12-19 NOTE — ED NOTES
.See Nursing Assessment      Emergency Department Nursing Plan of Care       The Nursing Plan of Care is developed from the Nursing assessment and Emergency Department Attending provider initial evaluation. The plan of care may be reviewed in the ED Provider note.     The Plan of Care was developed with the following considerations:   Patient / Family readiness to learn indicated by:verbalized understanding  Persons(s) to be included in education: patient  Barriers to Learning/Limitations:No    [address], RN    12/19/2021   5:35 PM

## 2021-12-19 NOTE — ED NOTES
Patient presents to ED with c/o retaining fluid and needs lasix.  Daughter states that they are waiting for the home health order to be corrected

## 2021-12-19 NOTE — ED PROVIDER NOTES
EMERGENCY DEPARTMENT HISTORY AND PHYSICAL EXAM    Date: 12/19/2021  Patient Name: Diogo Louis    History of Presenting Illness     Chief Complaint   Patient presents with    Leg Swelling         History Provided By: Patient and daughter    HPI: Diogo Louis is a 80 y.o. female with a PMH of hypertension, diabetes, HIV, CAD, CHF who presents with daughter who states that patient has 2 occasionally come to the ED for IV Lasix due to chronic CHF she takes Bumex in addition daily. She reports that patient has had a port placed 2 weeks ago so that she can have this done at home but it has not been accessed at this time. Patient has received IV Lasix about 2 weeks ago. She states that today patient started complaining of her abdomen feeling tight, her legs a little more swollen than usual and daughter states she did note patient to be short of breath when ambulating to the car today. Patient however denies any chest pain or shortness of breath, no abdominal pain, no nausea or vomiting. PCP: Milagros Rodriguez MD    Current Outpatient Medications   Medication Sig Dispense Refill    bumetanide (BUMEX) 2 mg tablet Take 1 Tablet by mouth daily. 30 Tablet 0    apixaban (ELIQUIS) 2.5 mg tablet 2.5 mg = 1 tab each dose, PO, every 12 hours, # 180 tab, 3 Refills, Pharmacy: [id number]      hydrALAZINE (APRESOLINE) 50 mg tablet 50 mg = 1 tab each dose, PO, tid, # 90 tab, 5 Refills, Pharmacy: [facility] #[id number]      dolutegravir-rilpivirine (JULUCA) 50-25 mg tab tablet = 1 tab each dose, PO, daily, with food, # 30 tab, 5 Refills, Pharmacy: [facility] #[id number]      amiodarone (CORDARONE) 200 mg tablet Take 200 mg by mouth.  cholecalciferol (VITAMIN D3) 1,000 unit cap daily. 1    aspirin delayed-release 81 mg tablet Take 81 mg by mouth daily.  atorvastatin (LIPITOR) 40 mg tablet Take 40 mg by mouth daily.       polyethylene glycol (Miralax) 17 gram/dose powder Take 17 g by mouth daily. 1 tablespoon with 8 oz of water daily 595 g 0    nystatin (MYCOSTATIN) powder Apply  to affected area four (4) times daily.  triamcinolone acetonide (KENALOG) 0.1 % topical cream Apply  to affected area two (2) times daily as needed for Skin Irritation. 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       Past Surgical History:  Past Surgical History:   Procedure Laterality Date    HX CHOLECYSTECTOMY      HX HEENT      cataract    HX HYSTERECTOMY      HX MYOMECTOMY      HX ORTHOPAEDIC      [place] CARDIAC SURG PROCEDURE UNLIST  1995    Stent Placement       Family History:  Family History   Problem Relation Age of Onset    Diabetes Other     Hypertension Other     Stroke Daughter     Other Daughter         sarcoidosis    Other Grandchild         sarcoidosis       Social History:  Social History     Tobacco Use    Smoking status: Never Smoker    Smokeless tobacco: Never Used   Substance Use Topics    Alcohol use: No    Drug use: No       Allergies: Allergies   Allergen Reactions    Coumadin [Warfarin] Hives    Penicillanic Sulfone Bl Beta-Lactamase Inhibitors Rash         Review of Systems   Review of Systems   Respiratory: Positive for shortness of breath. Cardiovascular: Positive for leg swelling. Negative for chest pain. Gastrointestinal: Negative for nausea and vomiting. Allergic/Immunologic: Negative for immunocompromised state. Neurological: Negative for speech difficulty and weakness. All other systems reviewed and are negative.       Physical Exam     Vitals:    12/19/21 1601 12/19/21 1709 12/19/21 1832   BP: (!) 146/53 (!) 140/52 (!) 130/48   Pulse: 66     Resp: 18     Temp: 98.3 °F (36.8 °C)     SpO2: 96%     Weight: 99.8 kg (220 lb) Physical Exam  Vitals and nursing note reviewed. Constitutional:       General: She is not in acute distress. Appearance: She is well-developed. HENT:      Head: Normocephalic and atraumatic. Eyes:      Conjunctiva/sclera: Conjunctivae normal.   Cardiovascular:      Rate and Rhythm: Normal rate and regular rhythm. Heart sounds: Normal heart sounds. Comments: Port noted to the R anterior chest wall  Pulmonary:      Effort: Pulmonary effort is normal. No respiratory distress. Breath sounds: Normal breath sounds. No wheezing or rales. Abdominal:      General: There is no distension. Palpations: Abdomen is soft. Tenderness: There is no abdominal tenderness. There is no guarding. Musculoskeletal:      Right lower le+ Edema present. Left lower le+ Edema present. Skin:     General: Skin is warm and dry. Neurological:      Mental Status: She is alert and oriented to person, place, and time. Psychiatric:         Behavior: Behavior normal.         Thought Content: Thought content normal.         Judgment: Judgment normal.           Diagnostic Study Results     Labs -     Recent Results (from the past 12 hour(s))   CBC WITH AUTOMATED DIFF    Collection Time: 21  5:39 PM   Result Value Ref Range    WBC 4.1 3.6 - 11.0 K/uL    RBC 2.62 (L) 3.80 - 5.20 M/uL    HGB 7.8 (L) 11.5 - 16.0 g/dL    HCT 25.0 (L) 35.0 - 47.0 %    MCV 95.4 80.0 - 99.0 FL    MCH 29.8 26.0 - 34.0 PG    MCHC 31.2 30.0 - 36.5 g/dL    RDW 15.6 (H) 11.5 - 14.5 %    PLATELET 657 618 - 952 K/uL    MPV 11.0 8.9 - 12.9 FL    NRBC 0.0 0  WBC    ABSOLUTE NRBC 0.00 0.00 - 0.01 K/uL    NEUTROPHILS 53 32 - 75 %    LYMPHOCYTES 31 12 - 49 %    MONOCYTES 14 (H) 5 - 13 %    EOSINOPHILS 1 0 - 7 %    BASOPHILS 1 0 - 1 %    IMMATURE GRANULOCYTES 0 0.0 - 0.5 %    ABS. NEUTROPHILS 2.2 1.8 - 8.0 K/UL    ABS. LYMPHOCYTES 1.3 0.8 - 3.5 K/UL    ABS. MONOCYTES 0.6 0.0 - 1.0 K/UL    ABS.  EOSINOPHILS 0.1 0.0 - 0.4 K/UL    ABS. BASOPHILS 0.0 0.0 - 0.1 K/UL    ABS. IMM. GRANS. 0.0 0.00 - 0.04 K/UL    DF AUTOMATED     METABOLIC PANEL, COMPREHENSIVE    Collection Time: 12/19/21  5:39 PM   Result Value Ref Range    Sodium 141 136 - 145 mmol/L    Potassium 3.8 3.5 - 5.1 mmol/L    Chloride 103 97 - 108 mmol/L    CO2 30 21 - 32 mmol/L    Anion gap 8 5 - 15 mmol/L    Glucose 101 (H) 65 - 100 mg/dL    BUN 34 (H) 6 - 20 MG/DL    Creatinine 2.68 (H) 0.55 - 1.02 MG/DL    BUN/Creatinine ratio 13 12 - 20      GFR est AA 20 (L) >60 ml/min/1.73m2    GFR est non-AA 17 (L) >60 ml/min/1.73m2    Calcium 9.0 8.5 - 10.1 MG/DL    Bilirubin, total 0.6 0.2 - 1.0 MG/DL    ALT (SGPT) 25 12 - 78 U/L    AST (SGOT) 32 15 - 37 U/L    Alk. phosphatase 68 45 - 117 U/L    Protein, total 7.6 6.4 - 8.2 g/dL    Albumin 3.6 3.5 - 5.0 g/dL    Globulin 4.0 2.0 - 4.0 g/dL    A-G Ratio 0.9 (L) 1.1 - 2.2     NT-PRO BNP    Collection Time: 12/19/21  5:39 PM   Result Value Ref Range    NT pro-BNP 4,290 (H) 0 - 450 PG/ML       Radiologic Studies -   XR CHEST PORT   Final Result   No overt CHF        CT Results  (Last 48 hours)    None        CXR Results  (Last 48 hours)               12/19/21 1737  XR CHEST PORT Final result    Impression:  No overt CHF       Narrative:  EXAM: XR CHEST PORT       INDICATION: Shortness of breath, hx of  congestive heart failure       COMPARISON: 11/21/2021       FINDINGS: A portable AP radiograph of the chest was obtained at 1730 hours. The   patient is on a cardiac monitor. There is mild central congestion without overt   CHF. Cardiomediastinal is normal. The bones and soft tissues are grossly within   normal limits. The Port-A-Cath terminates at the cavoatrial junction. An   external artifact overlies the left chest.                   Medical Decision Making   I am the first provider for this patient.     I reviewed the vital signs, available nursing notes, past medical history, past surgical history, family history and social history. Vital Signs-Reviewed the patient's vital signs. Records Reviewed: Nursing Notes and Old Medical Records    Provider Notes (Medical Decision Making):   Patient presents with peripheral edema today and hx of CHF. Daughter states pt normally has to come to ER to get IV lasix. Will get labs and cxr 1st.    ED Course as of 12/19/21 2016   Sun Dec 19, 2021   4445 Discussed results with patient and daughter, advised on the declining H&H since October and advised that patient should discuss with PCP. Will order Lasix and then plan for discharge as daughter is okay with patient continuing to diurese at home. [AH]      ED Course User Index  [AH] Osmel Barcenas PA-C          Disposition:  Discharged    DISCHARGE NOTE:   6:25 PM      Care plan outlined and precautions discussed. Patient has no new complaints, changes, or physical findings. Results of labs were reviewed with the patient and daughter. All medications were reviewed with the family; will d/c home. All of pt's questions and concerns were addressed. Patient was instructed and agrees to follow up with PCP prn, as well as to return to the ED upon further deterioration. Patient is ready to go home. Follow-up Information     Follow up With Specialties Details Why Caren Patel MD Internal Medicine Call in 1 day  [address] [facility]  [phone]            Discharge Medication List as of 12/19/2021  6:25 PM          Procedures:  Procedures    Please note that this dictation was completed with Dragon, computer voice recognition software. Quite often unanticipated grammatical, syntax, homophones, and other interpretive errors are inadvertently transcribed by the computer software. Please disregard these errors. Additionally, please excuse any errors that have escaped final proofreading. Diagnosis     Clinical Impression:   1.  Acute on chronic congestive heart failure, unspecified heart failure type ([facility]    2.  Acute on chronic anemia

## 2021-12-19 NOTE — ED NOTES
Patient (s) was given copy of dc instructions and no script(s). Patient (s)  verbalized understanding of instructions and script (s). Patient given a current medication reconciliation form and verbalized understanding of their medications. Patient (s) verbalized understanding of the importance of discussing medications with  his or her physician or clinic they will be following up with. Patient alert and oriented and in no acute distress. Patient discharged home ambulatory with daughter.

## 2021-12-25 ENCOUNTER — HOSPITAL ENCOUNTER (EMERGENCY)
Age: 86
Discharge: HOME | End: 2021-12-25
Attending: EMERGENCY MEDICINE
Payer: MEDICARE

## 2021-12-25 VITALS
HEIGHT: 60 IN | OXYGEN SATURATION: 97 % | HEART RATE: 72 BPM | BODY MASS INDEX: 40.25 KG/M2 | WEIGHT: 205 LBS | DIASTOLIC BLOOD PRESSURE: 58 MMHG | RESPIRATION RATE: 18 BRPM | SYSTOLIC BLOOD PRESSURE: 137 MMHG | TEMPERATURE: 98.5 F

## 2021-12-25 DIAGNOSIS — E87.79 OTHER HYPERVOLEMIA: Primary | ICD-10-CM

## 2021-12-25 DIAGNOSIS — M79.89 LEG SWELLING: ICD-10-CM

## 2021-12-25 PROCEDURE — 99282 EMERGENCY DEPT VISIT SF MDM: CPT

## 2021-12-25 PROCEDURE — 74011250636 HC RX REV CODE- 250/636: Performed by: EMERGENCY MEDICINE

## 2021-12-25 PROCEDURE — 96374 THER/PROPH/DIAG INJ IV PUSH: CPT

## 2021-12-25 RX ORDER — FUROSEMIDE 10 MG/ML
80 INJECTION INTRAMUSCULAR; INTRAVENOUS
Status: COMPLETED | OUTPATIENT
Start: 2021-12-25 | End: 2021-12-25

## 2021-12-25 RX ADMIN — FUROSEMIDE 80 MG: 10 INJECTION, SOLUTION INTRAVENOUS at 02:56

## 2021-12-25 NOTE — ED PROVIDER NOTES
EMERGENCY DEPARTMENT HISTORY AND PHYSICAL EXAM      Date: 12/25/2021  Patient Name: Siobhan Edwards    History of Presenting Illness     Chief Complaint   Patient presents with    Leg Swelling     leg and abdomen swelling hx chf       History Provided By: Patient    HPI: Siobhan Edwards, 80 y.o. female presents to the ED with cc of fluid retention. 12-year-old female with a history of hypertension, CHF on Bumex, atrial fibrillation on Eliquis, HIV, CKD and anemia who occasionally presents emergency department for IV diuresis presents with chief complaint of fluid overload. Patient presents to the emergency room today requesting IV diuresis. Reports at 10 PM began to experience the symptoms of fluid overload. Reports increasing abdominal girth and bilateral lower extremity edema. These are consistent with symptoms patient has had in the past.    Patient denies any fever chills patient denies chest pain or shortness of breath. She denies cough. She denies nausea, vomiting or diarrhea. She reports she is urinating normally. There are no other complaints, changes, or physical findings at this time. PCP: Angelica Briggs MD    No current facility-administered medications on file prior to encounter. Current Outpatient Medications on File Prior to Encounter   Medication Sig Dispense Refill    polyethylene glycol (Miralax) 17 gram/dose powder Take 17 g by mouth daily. 1 tablespoon with 8 oz of water daily 595 g 0    bumetanide (BUMEX) 2 mg tablet Take 1 Tablet by mouth daily. 30 Tablet 0    apixaban (ELIQUIS) 2.5 mg tablet 2.5 mg = 1 tab each dose, PO, every 12 hours, # 180 tab, 3 Refills, Pharmacy: [facility] #[id number]      nystatin (MYCOSTATIN) powder Apply  to affected area four (4) times daily.       hydrALAZINE (APRESOLINE) 50 mg tablet 50 mg = 1 tab each dose, PO, tid, # 90 tab, 5 Refills, Pharmacy: [facility] #[id number]      dolutegravir-rilpivirine (JULUCA) 50-25 mg tab tablet = 1 tab each dose, PO, daily, with food, # 30 tab, 5 Refills, Pharmacy: [facility] #[id number]      amiodarone (CORDARONE) 200 mg tablet Take 200 mg by mouth.  triamcinolone acetonide (KENALOG) 0.1 % topical cream Apply  to affected area two (2) times daily as needed for Skin Irritation. use thin layer      cholecalciferol (VITAMIN D3) 1,000 unit cap daily. 1    aspirin delayed-release 81 mg tablet Take 81 mg by mouth daily.  atorvastatin (LIPITOR) 40 mg tablet Take 40 mg by mouth daily. Past History     Past Medical History:  Past Medical History:   Diagnosis Date    Arthritis     CAD (coronary artery disease)     Stents    Diabetes ([facility] [address]     Hypertension     Incontinence     Infectious disease     HIV in 80 from blood transfusion       Past Surgical History:  Past Surgical History:   Procedure Laterality Date    HX CHOLECYSTECTOMY      HX HEENT      cataract    HX HYSTERECTOMY      HX MYOMECTOMY      HX ORTHOPAEDIC      SD CARDIAC SURG PROCEDURE UNLIST  1995    Stent Placement       Family History:  Family History   Problem Relation Age of Onset    Diabetes Other     Hypertension Other     Stroke Daughter     Other Daughter         sarcoidosis    Other Grandchild         sarcoidosis       Social History:  Social History     Tobacco Use    Smoking status: Never Smoker    Smokeless tobacco: Never Used   Substance Use Topics    Alcohol use: No    Drug use: No       Allergies: Allergies   Allergen Reactions    Coumadin [Warfarin] Hives    Penicillanic Sulfone Bl Beta-Lactamase Inhibitors Rash         Review of Systems   Review of Systems   Constitutional: Negative for activity change, chills and fever. HENT: Negative for facial swelling and voice change. Eyes: Negative for redness. Respiratory: Negative for cough, shortness of breath and wheezing. Cardiovascular: Positive for leg swelling. Negative for chest pain.    Gastrointestinal: Positive for abdominal distention. Negative for abdominal pain, diarrhea, nausea and vomiting. Genitourinary: Negative for decreased urine volume. Musculoskeletal: Negative for gait problem. Skin: Negative for pallor and rash. Neurological: Negative for tremors and facial asymmetry. Psychiatric/Behavioral: Negative for agitation. All other systems reviewed and are negative. Physical Exam   Physical Exam  Vitals and nursing note reviewed. Constitutional:       Comments: 80 YOF, resting in bed, no distress   HENT:      Head: Normocephalic and atraumatic. Cardiovascular:      Rate and Rhythm: Normal rate and regular rhythm. Heart sounds: No murmur heard. No friction rub. No gallop. Pulmonary:      Effort: Pulmonary effort is normal.      Breath sounds: Normal breath sounds. No stridor. No wheezing or rhonchi. Abdominal:      General: There is distension. Palpations: Abdomen is soft. Tenderness: There is no abdominal tenderness. Musculoskeletal:         General: Swelling (2+ lower extremity edema bilaterally) present. Normal range of motion. Cervical back: Normal range of motion. Skin:     General: Skin is warm. Capillary Refill: Capillary refill takes less than 2 seconds. Findings: No erythema or rash. Neurological:      General: No focal deficit present. Mental Status: She is alert. Psychiatric:         Mood and Affect: Mood normal.         Diagnostic Study Results     Labs -   No results found for this or any previous visit (from the past 12 hour(s)). Radiologic Studies -   No orders to display     CT Results  (Last 48 hours)    None        CXR Results  (Last 48 hours)    None          Medical Decision Making   I am the first provider for this patient. I reviewed the vital signs, available nursing notes, past medical history, past surgical history, family history and social history. Vital Signs-Reviewed the patient's vital signs.   Patient Vitals for the past 12 hrs:   Temp Pulse Resp BP SpO2   12/25/21 0256    (!) 137/58    12/25/21 0238 98.5 °F (36.9 °C) 72 18  97 %       Records Reviewed: Nursing Notes, Old Medical Records, Previous Radiology Studies and Previous Laboratory Studies    Provider Notes (Medical Decision Making):     80-year-old female presents emergency department with a chief complaint of concern for fluid overload. Vital signs are stable. Patient is not hypoxic. She is in no respiratory distress. She does appear mildly hypervolemic. Reviewed previous ED visits, labs and imaging. Patient's overall presentation is consistent with fluid overload either from CHF or patient's CKD. No respiratory symptoms. No chest pain. Doubt DVT. Had discussion with patient and patient's daughter in the room. Primarily, during the holiday they are trying to get set up with outpatient home health for IV Lasix. However they are unable to arrange a visit until Monday. Using shared decision-making and discussion of risks and benefits, I offered ED work-up including EKG, chest x-ray and labs. Patient and patient's daughter are comfortable deferring these as she has had labs multiple times in the past, most recently 1 week ago. They are requesting a dose of IV Lasix to aid in diuresis and bridge to outpatient follow-up. ED Course:   Initial assessment performed. The patients presenting problems have been discussed, and they are in agreement with the care plan formulated and outlined with them. I have encouraged them to ask questions as they arise throughout their visit. Per patient and family request, patient given IV lasix, will d/c from ED. Advised home health follow-up. Raiza Waldron MD      Disposition:    Discharged    DISCHARGE PLAN:  1. Current Discharge Medication List        2.    Follow-up Information     Follow up With Specialties Details Why Raul Verdin MD Internal Medicine In 1 week  [address] [place] [address] [address] [address] [phone]      [facility] EMERGENCY DEPT Emergency Medicine  If symptoms worsen [address]tazar Anne  [phone]        3. Return to ED if worse     Diagnosis     Clinical Impression:   1. Other hypervolemia    2. Leg swelling        Attestations:    Lo Eubanks MD    Please note that this dictation was completed with Managed by Q, the computer voice recognition software. Quite often unanticipated grammatical, syntax, homophones, and other interpretive errors are inadvertently transcribed by the computer software. Please disregard these errors. Please excuse any errors that have escaped final proofreading. Thank you.

## 2021-12-25 NOTE — DISCHARGE INSTRUCTIONS
Ms. Tabatha Vicente was seen in the emergency department for fluid overload. As we discussed, we did not do labs, x-rays or EKGs today. We gave a dose of 80 mg of Lasix. If symptoms worsen, please do not hesitate to return to the emergency department. Please follow-up with your outpatient cardiologist and home health to arrange Lasix infusions.

## 2021-12-25 NOTE — ED NOTES
Emergency Department Nursing Plan of Care       The Nursing Plan of Care is developed from the Nursing assessment and Emergency Department Attending provider initial evaluation. The plan of care may be reviewed in the ED Provider note.     The Plan of Care was developed with the following considerations:   Patient / Family readiness to learn indicated by:verbalized understanding  Persons(s) to be included in education: patient  Barriers to Learning/Limitations:No    Signed     Rainer Shi    12/25/2021   2:47 AM

## 2022-01-22 ENCOUNTER — APPOINTMENT (OUTPATIENT)
Dept: GENERAL RADIOLOGY | Age: 87
End: 2022-01-22
Attending: NURSE PRACTITIONER
Payer: MEDICARE

## 2022-01-22 ENCOUNTER — HOSPITAL ENCOUNTER (EMERGENCY)
Age: 87
Discharge: HOME | End: 2022-01-22
Attending: EMERGENCY MEDICINE
Payer: MEDICARE

## 2022-01-22 VITALS
DIASTOLIC BLOOD PRESSURE: 85 MMHG | HEART RATE: 62 BPM | RESPIRATION RATE: 20 BRPM | SYSTOLIC BLOOD PRESSURE: 139 MMHG | HEIGHT: 60 IN | OXYGEN SATURATION: 98 % | TEMPERATURE: 97.9 F | BODY MASS INDEX: 39.66 KG/M2 | WEIGHT: 202 LBS

## 2022-01-22 DIAGNOSIS — N39.0 URINARY TRACT INFECTION WITHOUT HEMATURIA, SITE UNSPECIFIED: ICD-10-CM
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
## 2022-01-22 DIAGNOSIS — M79.671 FOOT PAIN, RIGHT: ICD-10-CM

## 2022-01-22 DIAGNOSIS — M85.89 OSTEOPENIA OF MULTIPLE SITES: ICD-10-CM

## 2022-01-22 DIAGNOSIS — M25.512 ACUTE PAIN OF LEFT SHOULDER: Primary | ICD-10-CM

## 2022-01-22 DIAGNOSIS — M25.551 RIGHT HIP PAIN: ICD-10-CM

## 2022-01-22 LAB
APPEARANCE UR: ABNORMAL
BACTERIA URNS QL MICRO: ABNORMAL /HPF
BILIRUB UR QL: NEGATIVE
COLOR UR: ABNORMAL
EPITH CASTS URNS QL MICRO: ABNORMAL /LPF
GLUCOSE UR STRIP.AUTO-MCNC: NEGATIVE MG/DL
HGB UR QL STRIP: NEGATIVE
KETONES UR QL STRIP.AUTO: NEGATIVE MG/DL
LEUKOCYTE ESTERASE UR QL STRIP.AUTO: ABNORMAL
NITRITE UR QL STRIP.AUTO: NEGATIVE
PH UR STRIP: 6 [PH] (ref 5–8)
PROT UR STRIP-MCNC: ABNORMAL MG/DL
RBC #/AREA URNS HPF: ABNORMAL /HPF (ref 0–5)
SP GR UR REFRACTOMETRY: 1.01 (ref 1–1.03)
UA: UC IF INDICATED,UAUC: ABNORMAL
UROBILINOGEN UR QL STRIP.AUTO: 0.2 EU/DL (ref 0.2–1)
WBC URNS QL MICRO: ABNORMAL /HPF (ref 0–4)

## 2022-01-22 PROCEDURE — 99283 EMERGENCY DEPT VISIT LOW MDM: CPT

## 2022-01-22 PROCEDURE — 73030 X-RAY EXAM OF SHOULDER: CPT

## 2022-01-22 PROCEDURE — 73630 X-RAY EXAM OF FOOT: CPT

## 2022-01-22 PROCEDURE — 73502 X-RAY EXAM HIP UNI 2-3 VIEWS: CPT

## 2022-01-22 PROCEDURE — 81001 URINALYSIS AUTO W/SCOPE: CPT

## 2022-01-22 RX ORDER — CEPHALEXIN 500 MG/1
500 CAPSULE ORAL 2 TIMES DAILY
Qty: 14 CAPSULE | Refills: 0 | Status: SHIPPED | OUTPATIENT
Start: 2022-01-22 | End: 2022-01-29

## 2022-01-22 NOTE — ED PROVIDER NOTES
EMERGENCY DEPARTMENT HISTORY AND PHYSICAL EXAM    Date: 1/22/2022  Patient Name: Ean Rosales    History of Presenting Illness     Chief Complaint   Patient presents with   [facility] Fall    Urinary Pain         History Provided By: Patient    Chief Complaint: foot pain  Duration: last night   Timing:  Acute  Location: right foot  Quality: numb feeling  Severity: 7 out of 10  Modifying Factors: palpation worsens pain  Associated Symptoms: left shoulder pain right hip pain      HPI: Ean Rosales is a 80 y.o. female with a PMH of hypertension and CA D  HIV who presents with right foot pain acute onset last night patient states she was seated on the commode and fell onto her buttocks. Patient reports right foot pain left shoulder pain and right hip pain. She denies other injuries. She denies hitting her head. Patient also reports dysuria for the past few days. PCP: Chiquis Alvarez MD    Current Outpatient Medications   Medication Sig Dispense Refill    cephALEXin (Keflex) 500 mg capsule Take 1 Capsule by mouth two (2) times a day for 7 days. 14 Capsule 0    polyethylene glycol (Miralax) 17 gram/dose powder Take 17 g by mouth daily. 1 tablespoon with 8 oz of water daily 595 g 0    bumetanide (BUMEX) 2 mg tablet Take 1 Tablet by mouth daily. 30 Tablet 0    apixaban (ELIQUIS) 2.5 mg tablet 2.5 mg = 1 tab each dose, PO, every 12 hours, # 180 tab, 3 Refills, Pharmacy: [facility] #[id number]      nystatin (MYCOSTATIN) powder Apply  to affected area four (4) times daily.  hydrALAZINE (APRESOLINE) 50 mg tablet 50 mg = 1 tab each dose, PO, tid, # 90 tab, 5 Refills, Pharmacy: [facility] #[id number]      dolutegravir-rilpivirine (JULUCA) 50-25 mg tab tablet = 1 tab each dose, PO, daily, with food, # 30 tab, 5 Refills, Pharmacy: [facility] #[id number]      amiodarone (CORDARONE) 200 mg tablet Take 200 mg by mouth.       triamcinolone acetonide (KENALOG) 0.1 % topical cream Apply  to affected area two (2) times daily as needed for Skin Irritation. use thin layer      cholecalciferol (VITAMIN D3) 1,000 unit cap daily. 1    aspirin delayed-release 81 mg tablet Take 81 mg by mouth daily.  atorvastatin (LIPITOR) 40 mg tablet Take 40 mg by mouth daily. 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       Past Surgical History:  Past Surgical History:   Procedure Laterality Date    HX CHOLECYSTECTOMY      HX HEENT      cataract    HX HYSTERECTOMY      HX MYOMECTOMY      HX ORTHOPAEDIC      [place] CARDIAC SURG PROCEDURE UNLIST  1995    Stent Placement       Family History:  Family History   Problem Relation Age of Onset    Diabetes Other     Hypertension Other     Stroke Daughter     Other Daughter         sarcoidosis    Other Grandchild         sarcoidosis       Social History:  Social History     Tobacco Use    Smoking status: Never Smoker    Smokeless tobacco: Never Used   Substance Use Topics    Alcohol use: No    Drug use: No       Allergies: Allergies   Allergen Reactions    Coumadin [Warfarin] Hives    Penicillanic Sulfone Bl Beta-Lactamase Inhibitors Rash         Review of Systems   Review of Systems   Constitutional: Negative for fatigue and fever. Respiratory: Negative for shortness of breath and wheezing. Cardiovascular: Negative for chest pain. Gastrointestinal: Negative for abdominal pain. Musculoskeletal: Negative for arthralgias, myalgias, neck pain and neck stiffness. Foot pain shoulder pain hip pain   Skin: Negative for pallor and rash. Neurological: Negative for dizziness, tremors and headaches. All other systems reviewed and are negative.       Physical Exam     Vitals:    01/22/22 1157   BP: 139/85   Pulse: 62   Resp: 20   Temp: 97.9 °F (36.6 °C)   SpO2: 98%   Weight: 91.6 kg (202 lb)   Height: 5' (1.524 m)     Physical Exam  Vitals and nursing note reviewed. Constitutional:       General: She is not in acute distress. Appearance: Normal appearance. She is well-developed. HENT:      Head: Normocephalic and atraumatic. Right Ear: External ear normal.      Left Ear: External ear normal.      Nose: Nose normal.      Mouth/Throat:      Mouth: Mucous membranes are moist.   Eyes:      Conjunctiva/sclera: Conjunctivae normal.   Cardiovascular:      Rate and Rhythm: Normal rate and regular rhythm. Heart sounds: Normal heart sounds. Pulmonary:      Effort: Pulmonary effort is normal. No respiratory distress. Breath sounds: Normal breath sounds. No wheezing. Abdominal:      General: Bowel sounds are normal.      Palpations: Abdomen is soft. Tenderness: There is no abdominal tenderness. Musculoskeletal:         General: Normal range of motion. Cervical back: Normal range of motion and neck supple. Comments: Right lateral foot and right lateral malleolus DNV intact tender to palpate right hip . Tender to palpate proximal humerus DNV intact   Lymphadenopathy:      Cervical: No cervical adenopathy. Skin:     General: Skin is warm and dry. Findings: No rash. Neurological:      Mental Status: She is alert and oriented to person, place, and time. Cranial Nerves: No cranial nerve deficit. Coordination: Coordination normal.   Psychiatric:         Behavior: Behavior normal.         Thought Content:  Thought content normal.         Judgment: Judgment normal.           Diagnostic Study Results     Labs -     Recent Results (from the past 12 hour(s))   URINALYSIS W/ REFLEX CULTURE    Collection Time: 01/22/22  2:02 PM    Specimen: Urine   Result Value Ref Range    Color YELLOW/STRAW      Appearance CLOUDY (A) CLEAR      Specific gravity 1.010 1.003 - 1.030      pH (UA) 6.0 5.0 - 8.0      Protein TRACE (A) NEG mg/dL Glucose Negative NEG mg/dL    Ketone Negative NEG mg/dL    Bilirubin Negative NEG      Blood Negative NEG      Urobilinogen 0.2 0.2 - 1.0 EU/dL    Nitrites Negative NEG      Leukocyte Esterase TRACE (A) NEG      WBC 5-10 0 - 4 /hpf    RBC 0-5 0 - 5 /hpf    Epithelial cells FEW FEW /lpf    Bacteria 2+ (A) NEG /hpf    UA:UC IF INDICATED CULTURE NOT INDICATED BY UA RESULT CNI         Radiologic Studies -   XR SHOULDER LT AP/LAT MIN 2 V   Final Result   No fracture or dislocation. .      XR HIP RT W OR WO PELV 2-3 VWS   Final Result   Suboptimal images. Osteopenia, degenerative findings and atherosclerotic   calcifications. No acute fracture or dislocation demonstrated. XR FOOT RT MIN 3 V   Final Result   1. Severe diffuse osteopenia. 2. No acute fracture or dislocation. 3. Dorsal foot and ankle soft tissue swelling. 5 mm linear density in dorsal   subcutaneous fat at level of mid metatarsals consistent with foreign body. CT Results  (Last 48 hours)    None        CXR Results  (Last 48 hours)    None            Medical Decision Making   I am the first provider for this patient. I reviewed the vital signs, available nursing notes, past medical history, past surgical history, family history and social history. Vital Signs-Reviewed the patient's vital signs. Records Reviewed: Nursing Notes    Provider Notes (Medical Decision Making):   DDX sprain strain  contusion foot fracture UTI           Disposition:  home    DISCHARGE NOTE:         Care plan outlined and precautions discussed. Patient has no new complaints, changes, or physical findings. Results of tests were reviewed with the patient. All medications were reviewed with the patient; will d/c home with keflex tylenol OTC for pain. All of pt's questions and concerns were addressed. Patient was instructed and agrees to follow up with PCP, as well as to return to the ED upon further deterioration. Patient is ready to go home.     Follow-up Information     Follow up With Specialties Details Why Jennifer Cowan MD Internal Medicine In 1 week  [address]  [phone]            Discharge Medication List as of 1/22/2022  2:49 PM      START taking these medications    Details   cephALEXin (Keflex) 500 mg capsule Take 1 Capsule by mouth two (2) times a day for 7 days. , Normal, Disp-14 Capsule, R-0         CONTINUE these medications which have NOT CHANGED    Details   polyethylene glycol (Miralax) 17 gram/dose powder Take 17 g by mouth daily. 1 tablespoon with 8 oz of water daily, Normal, Disp-595 g, R-0      bumetanide (BUMEX) 2 mg tablet Take 1 Tablet by mouth daily. , Normal, Disp-30 Tablet, R-0      apixaban (ELIQUIS) 2.5 mg tablet 2.5 mg = 1 tab each dose, PO, every 12 hours, # 180 tab, 3 Refills, Pharmacy: [facility] [address] #[id number], Historical Med      nystatin (MYCOSTATIN) powder Apply  to affected area four (4) times daily. , Historical Med      hydrALAZINE (APRESOLINE) 50 mg tablet 50 mg = 1 tab each dose, PO, tid, # 90 tab, 5 Refills, Pharmacy: [facility] #[id number], Historical Med      dolutegravir-rilpivirine (JULUCA) 50-25 mg tab tablet = 1 tab each dose, PO, daily, with food, # 30 tab, 5 Refills, Pharmacy: [facility] #[id number], Historical Med      amiodarone (CORDARONE) 200 mg tablet Take 200 mg by mouth., Historical Med      triamcinolone acetonide (KENALOG) 0.1 % topical cream Apply  to affected area two (2) times daily as needed for Skin Irritation. use thin layer, Historical Med      cholecalciferol (VITAMIN D3) 1,000 unit cap daily. , Historical Med, R-1      aspirin delayed-release 81 mg tablet Take 81 mg by mouth daily. , Historical Med      atorvastatin (LIPITOR) 40 mg tablet Take 40 mg by mouth daily. , Historical Med             Procedures:  Procedures    Please note that this dictation was completed with Dragon, computer voice recognition software.   Quite often unanticipated grammatical, syntax, homophones, and other interpretive errors are inadvertently transcribed by the computer software. Please disregard these errors. Additionally, please excuse any errors that have escaped final proofreading. Diagnosis     Clinical Impression:   1. Acute pain of left shoulder    2. Foot pain, right    3. Right hip pain    4. Osteopenia of multiple sites    5. Urinary tract infection without hematuria, site unspecified    6.  Fall, initial encounter

## 2022-01-22 NOTE — ED TRIAGE NOTES
Patient fell off of bedside commode last night while wiping self. C/o left, upper arm pain and right ankle pain. Also states having burning with urination.

## 2022-01-22 NOTE — ED NOTES
Discharge instructions were given to the patient by provider. The patient left the Emergency Department in hospital wheelchair, alert and oriented and in no acute distress with 0 prescriptions. The patient was encouraged to call or return to the ED for worsening issues or problems and was encouraged to schedule a follow up appointment for continuing care. The patient verbalized understanding of discharge instructions and prescriptions, all questions were answered. The patient has no further concerns at this time.

## 2022-01-22 NOTE — ED NOTES
Pt presents to ED via hospital wheelchair complaining of pain after falling off her bedside commode last night. Pt reports pain to the right ankle/foot, right hip/pelvis area, and left upper arm/shoulder area. Pt presents with family who assist with ADLs and report giving pt \"1 tylenol\" this morning. Pt also reports dysuria x2 days. RR even and unlabored, skin is warm and dry. Assessment completed and pt updated on plan of care. Call bell in reach. Side rails x2. Emergency Department Nursing Plan of Care       The Nursing Plan of Care is developed from the Nursing assessment and Emergency Department Attending provider initial evaluation. The plan of care may be reviewed in the ED Provider note.     The Plan of Care was developed with the following considerations:   Patient / Family readiness to learn indicated by:verbalized understanding  Persons(s) to be included in education: patient and family  Barriers to Learning/Limitations:No    Signed     Paulette Ward RN    1/22/2022   1:38 PM

## 2022-03-18 PROBLEM — Z86.69 HISTORY OF SEIZURE DISORDER: Status: ACTIVE | Noted: 2018-04-06

## 2022-03-18 PROBLEM — B20 HIV (HUMAN IMMUNODEFICIENCY VIRUS INFECTION) (HCC): Status: ACTIVE | Noted: 2018-04-06

## 2022-03-19 PROBLEM — E11.65 TYPE 2 DIABETES MELLITUS WITH HYPERGLYCEMIA, WITHOUT LONG-TERM CURRENT USE OF INSULIN (HCC): Status: ACTIVE | Noted: 2018-04-06

## 2022-03-19 PROBLEM — I10 ESSENTIAL HYPERTENSION: Status: ACTIVE | Noted: 2018-04-06

## 2022-03-19 PROBLEM — E66.01 OBESITY, MORBID (HCC): Status: ACTIVE | Noted: 2018-04-25

## 2022-03-19 PROBLEM — E11.21 TYPE 2 DIABETES WITH NEPHROPATHY (HCC): Status: ACTIVE | Noted: 2018-04-25

## 2022-03-19 PROBLEM — N17.9 AKI (ACUTE KIDNEY INJURY) (HCC): Status: ACTIVE | Noted: 2018-04-05

## 2022-03-19 PROBLEM — I49.8 WANDERING ATRIAL PACEMAKER BY ELECTROCARDIOGRAM: Status: ACTIVE | Noted: 2018-04-05

## 2022-03-19 PROBLEM — Z95.820 S/P ANGIOPLASTY WITH STENT: Status: ACTIVE | Noted: 2018-04-06

## 2023-05-21 RX ORDER — TRIAMCINOLONE ACETONIDE 1 MG/G
CREAM TOPICAL 2 TIMES DAILY PRN
COMMUNITY

## 2023-05-21 RX ORDER — BUMETANIDE 2 MG/1
2 TABLET ORAL DAILY
COMMUNITY
Start: 2021-10-14

## 2023-05-21 RX ORDER — NYSTATIN 100000 [USP'U]/G
POWDER TOPICAL 4 TIMES DAILY
COMMUNITY

## 2023-05-21 RX ORDER — HYDRALAZINE HYDROCHLORIDE 50 MG/1
TABLET, FILM COATED ORAL
COMMUNITY
Start: 2020-07-27

## 2023-05-21 RX ORDER — ATORVASTATIN CALCIUM 40 MG/1
40 TABLET, FILM COATED ORAL DAILY
COMMUNITY

## 2023-05-21 RX ORDER — AMIODARONE HYDROCHLORIDE 200 MG/1
200 TABLET ORAL
COMMUNITY

## 2023-05-21 RX ORDER — POLYETHYLENE GLYCOL 3350 17 G/17G
17 POWDER, FOR SOLUTION ORAL DAILY
COMMUNITY
Start: 2021-10-28

## 2023-05-21 RX ORDER — ASPIRIN 81 MG/1
81 TABLET ORAL DAILY
COMMUNITY

## 2024-02-13 NOTE — PROGRESS NOTES
Speech Pathology bedside swallow evaluation/discharge  Patient: Mary Rand (89 y.o. female)  Date: 4/6/2018  Primary Diagnosis: TIA (transient ischemic attack)  TIA (transient ischemic attack)  New onset a-fib ([facility]  MERLIN (acute kidney injury) ([facility]        Precautions: aspiration       ASSESSMENT :  Based on the objective data described below, the patient presents with normal swallow and speech at this time. She was admitted with TIA vs sz after 20 min of staring at the wall and talking gibberish. MRI results pending. PMH:  H/o previous issues of staring. Also: HTN, DM, HIV . Skilled therapy provided by a speech-language pathologist is not indicated at this time. PLAN :  Recommendations:  Ok for regular diet, thin liquids. Discharge Recommendations: None     SUBJECTIVE:   Patient stated I can't eat all this food. OBJECTIVE:     Past Medical History:   Diagnosis Date    Diabetes ([facility]     Hypertension     Infectious disease     HIV in 80 from blood transfusion     Past Surgical History:   Procedure Laterality Date    CARDIAC SURG PROCEDURE UNLIST  1995    Stent Placement    HX CHOLECYSTECTOMY      HX HEENT      cataract    HX HYSTERECTOMY      HX ORTHOPAEDIC       Prior Level of Function/Home Situation:   Home Situation  Home Environment: Private residence  # Steps to Enter:  (elevator)  One/Two Story Residence: Two story  Living Alone: Yes  Support Systems: Family member(s), [employer] / [employer] community  Patient Expects to be Discharged to[de-identified] Private residence  Current DME Used/Available at Home:  Pine, rollator, Wheelchair, power, Glucometer, Blood pressure cuff, Shower chair, Grab bars  Diet prior to admission: regular, thins  Current Diet:  Regular, thins. HE passed the STAND.    Cognitive and Communication Status:  Neurologic State: Alert  Orientation Level: Oriented to person, Oriented to place, Oriented to situation, Oriented to time  Cognition: Memory loss, Decreased attention/concentration  Perception: Appears intact  Perseveration: No perseveration noted  Safety/Judgement: Decreased insight into deficits  Oral Assessment:  Oral Assessment  Labial: No impairment  Dentition: Edentulous  Oral Hygiene: WFL  Lingual: No impairment  Mandible: No impairment  P.O. Trials:  Patient Position: upright in bed  Vocal quality prior to P.O.: No impairment  Consistency Presented: Thin liquid; Solid  How Presented: Straw;Spoon;Self-fed/presented   ORAL PHASE:   Bolus Acceptance: No impairment  Bolus Formation/Control: No impairment   Patient chewed and expectorated some harder fruit. She reports \"I do that a lot at home\". Not malnourished, so will continue to offer regular diet at this time. Propulsion: No impairment  Oral Residue: None   PHARYNGEAL PHASE:   Initiation of Swallow: No impairment  Laryngeal Elevation: Functional  Aspiration Signs/Symptoms: None  Pharyngeal Phase Characteristics: No impairment, issues, or problems                    NOMS:   The NOMS functional outcome measure was used to quantify this patient's level of swallowing impairment. Based on the NOMS, the patient was determined to be at level 7 for swallow function     G Codes: In compliance with CMSs Claims Based Outcome Reporting, the following G-code set was chosen for this patient based the use of the NOMS functional outcome to quantify this patient's level of swallowing impairment. Using the NOMS, the patient was determined to be at level 7 for swallow function which correlates with the CH= 0% level of severity. Based on the objective assessment provided within this note, the current, goal, and discharge g-codes are as follows:    Swallow  Swallowing:   Swallow Current Status CH= 0%   Swallow Goal Status CH= 0%   Swallow D/C Status CH= 0%      NOMS Swallowing Levels:  Level 1 (CN): NPO  Level 2 (CM): NPO but takes consistency in therapy  Level 3 (CL): Takes less than 50% of nutrition p.o. and continues with nonoral feedings; and/or safe with mod cues; and/or max diet restriction  Level 4 (CK): Safe swallow but needs mod cues; and/or mod diet restriction; and/or still requires some nonoral feeding/supplements  Level 5 (CJ): Safe swallow with min diet restriction; and/or needs min cues  Level 6 (CI): Independent with p.o.; rare cues; usually self cues; may need to avoid some foods or needs extra time  Level 7 ([address] [place] [address]): Independent for all p.o.  LIZETH. (2003). National Outcomes Measurement System (NOMS): Adult Speech-Language Pathology User's Guide. Pain:  Pain Scale 1: Numeric (0 - 10)  Pain Intensity 1: 0     After treatment:   [] Patient left in no apparent distress sitting up in chair  [] Patient left in no apparent distress in bed  [] Call bell left within reach  [x] Nursing notified  [] Caregiver present  [] Bed alarm activated    COMMUNICATION/EDUCATION:   The patients plan of care including findings, recommendations, and recommended diet changes were discussed with: Registered Nurse.    [] Posted safety precautions in patient's room. [x] Patient/family have participated as able and agree with findings and recommendations. [] Patient is unable to participate in plan of care at this time.     Thank you for this referral.  DMITRY Sanchez  Time Calculation: 10 mins Statement Selected

## 2025-02-20 NOTE — DISCHARGE SUMMARY
Hospitalist Discharge Summary     Patient ID:  Michael Baer  [id number]  80 y.o.  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 OK to go home per neuro with outpt f/up  -echo-await results  MRI head and MRA head and neck-  1. Chronic white matter T2 hyperintensity likely related to chronic small vessel  ischemic change. 2. No acute intracranial abnormality or interval change since 12/26/16. IMPRESSION:  1. Less than 20% stenosis origin right internal carotid artery. 2. No stenosis left carotid bifurcation. PT/OT eval     Acute kidney injury-resolved   CKD 3  Creatinine 1.4 at baseline     ? Possible wandering atrial pacemaker with Atrial Premature contractions on EKG  apprecaite cardiology consult  Continue ASA/BB per Dr. Lesly Valdez cardio f/up. She might benefit form loop recorder(outpaitent)     Hypertension, essential  resumeToprol-XL,losartan     Diabetes mellitus  Hold metformin, insulin sliding scale  A1c 6.7 last year,A1c 8  Diabetic diet     Asymptomatic HIV  Follows up with [facility]  Currently on [address] [place] [address]     Morbid obese  Body mass index is 44.13 kg/(m^2). CONSULTATIONS:  IP CONSULT TO HOSPITALIST  IP CONSULT TO CARDIOLOGY  IP CONSULT TO NEUROLOGY    Excerpted HPI from H&P of Osmar Butts MD:  Bernice Solitario is a 80 y.o.  female with PMH of TIA,HTN,DM who presents to ED with c/o above. History was taken patient and daughters at bedside. Per them ,patient was confused and disoriented. She had starring spell for about 10-15 minutes after which came back to her baseline. They deny her having any seizure activity. She did not lose her consciousness or had any head injuries. Patient does have a remote history of TIAs in the past with questionable absence seizure. She states she is not on Depakote. She does have a history of HIV secondary to blood transfusion and is monitored by HIV clinic at [facility]. In ED,CT head-: Stable nonspecific white matter disease. No acute intracranial process identified. EKG was done which revealed atrial fibrillation. Patient denies having history of A. fib in the past.         ______________________________________________________________________  DISCHARGE SUMMARY/HOSPITAL COURSE:  for full details see H&P, daily progress notes, labs, consult notes. _______________________________________________________________________  Patient seen and examined by me on discharge day. Pertinent Findings:  Gen:    Not in distress  Chest: Clear lungs  CVS:   Regular rhythm. No edema  Abd:  Soft, not distended, not tender  Neuro:  Alert with good insight. Oriented to person, place, and time   _______________________________________________________________________  DISCHARGE MEDICATIONS:   Current Discharge Medication List      CONTINUE these medications which have NOT CHANGED    Details   losartan (COZAAR) 100 mg tablet Take 100 mg by mouth daily. cholecalciferol (VITAMIN D3) 1,000 unit tablet Take 1,000 Units by mouth daily. liver oil-zinc oxide ointment Apply  to affected area as needed for Skin Irritation. aspirin delayed-release 81 mg tablet Take 81 mg by mouth daily. atorvastatin (LIPITOR) 40 mg tablet Take 40 mg by mouth daily. hydroCHLOROthiazide (HYDRODIURIL) 25 mg tablet Take 25 mg by mouth daily. metFORMIN (GLUMETZA ER) 500 mg TG24 24 hour tablet Take 500 mg by mouth two (2) times a day. metoprolol succinate (TOPROL-XL) 100 mg tablet Take 100 mg by mouth daily. elvitegravir-cobicistat-emtricitabine-tenofovir alafenamide (GENVOYA) tab tablet Take 1 Tab by mouth daily (with breakfast). isosorbide mononitrate ER (IMDUR) 60 mg CR tablet Take 60 mg by mouth every morning. NEW RX on 3/2/18  not yet started. My Recommended Diet, Activity, Wound Care, and follow-up labs are listed in the patient's Discharge Insturctions which I have personally completed and reviewed. _______________________________________________________________________  DISPOSITION:     Home with Family: x   Home with HH/PT/OT/RN:    SNF/LTC:    DAVE:    OTHER:        Condition at Discharge:  Stable  _______________________________________________________________________  Follow up with:   PCP : PROVIDER UNKNOWN  Follow-up Information     Follow up With Details Comments 1 [facility] Paul Avilez MD On 4/13/2018 Your appointment is on 4/13/18 at 12:30pm. [address] On 4/13/2018 Your appointment is for 4/13/18 at 8:00am with Lara Mccarthy, Nurse Practitioner.  [address] [place] [address]  [phone]    [facility] on [address] [facility] to contact you  [place] [id number]  [phone]    Provider Unknown   Patient not available to ask                Total time in minutes spent coordinating this discharge (includes going over instructions, follow-up, prescriptions, and preparing report for sign off to her PCP) :  45 minutes    Signed:  Carley Hargrove MD by discharge: Pt will improve all static/dynamic balance grades by 1/2 grade.